# Patient Record
Sex: MALE | Race: WHITE | Employment: UNEMPLOYED | URBAN - METROPOLITAN AREA
[De-identification: names, ages, dates, MRNs, and addresses within clinical notes are randomized per-mention and may not be internally consistent; named-entity substitution may affect disease eponyms.]

---

## 2017-06-01 DIAGNOSIS — E87.6 HYPOKALEMIA: ICD-10-CM

## 2017-06-01 DIAGNOSIS — K21.00 GASTROESOPHAGEAL REFLUX DISEASE WITH ESOPHAGITIS: ICD-10-CM

## 2017-06-01 DIAGNOSIS — J45.40 MODERATE PERSISTENT ASTHMA WITHOUT COMPLICATION: ICD-10-CM

## 2017-06-01 DIAGNOSIS — Z13.220 SCREENING CHOLESTEROL LEVEL: ICD-10-CM

## 2017-06-01 LAB
ALBUMIN SERPL-MCNC: 4.7 G/DL (ref 3.9–4.9)
ALP BLD-CCNC: 85 U/L (ref 35–104)
ALT SERPL-CCNC: 19 U/L (ref 0–41)
ANION GAP SERPL CALCULATED.3IONS-SCNC: 14 MEQ/L (ref 7–13)
AST SERPL-CCNC: 14 U/L (ref 0–40)
BASOPHILS ABSOLUTE: 0.1 K/UL (ref 0–0.2)
BASOPHILS RELATIVE PERCENT: 1.1 %
BILIRUB SERPL-MCNC: 0.4 MG/DL (ref 0–1.2)
BUN BLDV-MCNC: 7 MG/DL (ref 6–20)
CALCIUM SERPL-MCNC: 9.7 MG/DL (ref 8.6–10.2)
CHLORIDE BLD-SCNC: 99 MEQ/L (ref 98–107)
CHOLESTEROL, TOTAL: 146 MG/DL (ref 0–199)
CO2: 26 MEQ/L (ref 22–29)
CREAT SERPL-MCNC: 0.92 MG/DL (ref 0.7–1.2)
EOSINOPHILS ABSOLUTE: 0.2 K/UL (ref 0–0.7)
EOSINOPHILS RELATIVE PERCENT: 2.6 %
GFR AFRICAN AMERICAN: >60
GFR NON-AFRICAN AMERICAN: >60
GLOBULIN: 2.2 G/DL (ref 2.3–3.5)
GLUCOSE BLD-MCNC: 88 MG/DL (ref 74–109)
HCT VFR BLD CALC: 42.6 % (ref 42–52)
HDLC SERPL-MCNC: 28 MG/DL (ref 40–59)
HEMOGLOBIN: 14.8 G/DL (ref 14–18)
LDL CHOLESTEROL CALCULATED: 85 MG/DL (ref 0–129)
LYMPHOCYTES ABSOLUTE: 2.4 K/UL (ref 1–4.8)
LYMPHOCYTES RELATIVE PERCENT: 26.7 %
MCH RBC QN AUTO: 33 PG (ref 27–31.3)
MCHC RBC AUTO-ENTMCNC: 34.6 % (ref 33–37)
MCV RBC AUTO: 95.4 FL (ref 80–100)
MONOCYTES ABSOLUTE: 0.6 K/UL (ref 0.2–0.8)
MONOCYTES RELATIVE PERCENT: 6.7 %
NEUTROPHILS ABSOLUTE: 5.7 K/UL (ref 1.4–6.5)
NEUTROPHILS RELATIVE PERCENT: 62.9 %
PDW BLD-RTO: 12.9 % (ref 11.5–14.5)
PLATELET # BLD: 371 K/UL (ref 130–400)
POTASSIUM SERPL-SCNC: 3 MEQ/L (ref 3.5–5.1)
POTASSIUM SERPL-SCNC: 3.3 MEQ/L (ref 3.5–5.1)
RBC # BLD: 4.47 M/UL (ref 4.7–6.1)
SODIUM BLD-SCNC: 139 MEQ/L (ref 132–144)
TOTAL PROTEIN: 6.9 G/DL (ref 6.4–8.1)
TRIGL SERPL-MCNC: 166 MG/DL (ref 0–200)
WBC # BLD: 9 K/UL (ref 4.8–10.8)

## 2017-06-08 DIAGNOSIS — E87.6 HYPOKALEMIA: ICD-10-CM

## 2017-06-08 LAB — POTASSIUM SERPL-SCNC: 3 MEQ/L (ref 3.5–5.1)

## 2017-06-15 DIAGNOSIS — E87.6 HYPOKALEMIA: ICD-10-CM

## 2017-06-15 LAB
MAGNESIUM: 2 MG/DL (ref 1.7–2.3)
POTASSIUM SERPL-SCNC: 3.9 MEQ/L (ref 3.5–5.1)

## 2017-06-18 ENCOUNTER — HOSPITAL ENCOUNTER (OUTPATIENT)
Dept: ULTRASOUND IMAGING | Age: 37
Discharge: HOME | End: 2017-06-18
Payer: COMMERCIAL

## 2017-06-18 ENCOUNTER — HOSPITAL ENCOUNTER (OUTPATIENT)
Dept: GENERAL RADIOLOGY | Age: 37
Discharge: HOME | End: 2017-06-18
Payer: COMMERCIAL

## 2017-06-18 DIAGNOSIS — R07.89 XYPHOIDALGIA: ICD-10-CM

## 2017-06-18 DIAGNOSIS — E87.6 HYPOKALEMIA: ICD-10-CM

## 2017-06-18 PROCEDURE — 71020 XR CHEST STANDARD TWO VW: CPT

## 2017-06-18 PROCEDURE — 76775 US EXAM ABDO BACK WALL LIM: CPT

## 2017-06-28 DIAGNOSIS — J45.40 MODERATE PERSISTENT ASTHMA WITHOUT COMPLICATION: ICD-10-CM

## 2017-09-20 ENCOUNTER — HOSPITAL ENCOUNTER (OUTPATIENT)
Dept: GENERAL RADIOLOGY | Age: 37
Discharge: HOME | End: 2017-09-20
Payer: COMMERCIAL

## 2017-09-20 DIAGNOSIS — M25.531 WRIST PAIN, CHRONIC, RIGHT: ICD-10-CM

## 2017-09-20 DIAGNOSIS — R20.0 NUMBNESS OF RIGHT HAND: ICD-10-CM

## 2017-09-20 DIAGNOSIS — G89.29 WRIST PAIN, CHRONIC, RIGHT: ICD-10-CM

## 2017-09-20 DIAGNOSIS — E87.6 HYPOKALEMIA: ICD-10-CM

## 2017-09-20 LAB — POTASSIUM SERPL-SCNC: 3.8 MEQ/L (ref 3.5–5.1)

## 2017-09-20 PROCEDURE — 73100 X-RAY EXAM OF WRIST: CPT

## 2017-10-25 ENCOUNTER — HOSPITAL ENCOUNTER (OUTPATIENT)
Dept: NEUROLOGY | Age: 37
Discharge: HOME | End: 2017-10-25
Payer: COMMERCIAL

## 2017-10-25 PROCEDURE — 95910 NRV CNDJ TEST 7-8 STUDIES: CPT

## 2017-10-25 PROCEDURE — 95886 MUSC TEST DONE W/N TEST COMP: CPT

## 2017-10-25 NOTE — PROCEDURES
[address]                     [address], [address] [facility]                            ELECTROMYOGRAM REPORT    PATIENT NAME: Merrill Beyer                    :             1980  MED REC NO:   [id number]                           ROOM:  ACCOUNT NO:   [de-identified]                          ADMISSION DATE:  10/25/2017  PROVIDER:     Milton Flores MD    DATE OF EMG:  10/25/2017    REFERRING PROVIDER:  Lilo Elizabeth PA-C    REASON FOR THE STUDY:  The patient was having numbness in the hands. EMG study was done to look for peripheral nerve entrapments versus  peripheral neuropathy versus cervical radiculopathy. Motor nerve conduction velocities and F wave latencies are normal in  all the nerves tested. Distal motor and sensory latencies are normal in the ulnar nerves but delayed in the median nerves, being worse on the left side. On the concentric needle electrode examination, mild denervation  changes are present in the thenar muscles. 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 The patient is being tried on conservative management. If his  symptoms continue or worsen, he may need decompression of the median  nerves to start with on the left side. If clinically indicated, we are going to repeat the study in a year. Thank you very much Ms. Elizabeth for allowing me to see the patient. Please feel free to call me if I can be of any further assistance  regarding this patient's evaluation.         Kayla Palencia MD    D: 10/25/2017 17:20:48       T: 10/25/2017 18:31:29     [email or web address]  Job#: [id number]     Doc#: [id number]

## 2018-02-08 ENCOUNTER — OFFICE VISIT (OUTPATIENT)
Dept: FAMILY MEDICINE CLINIC | Age: 38
End: 2018-02-08
Payer: COMMERCIAL

## 2018-02-08 VITALS
DIASTOLIC BLOOD PRESSURE: 78 MMHG | RESPIRATION RATE: 16 BRPM | WEIGHT: 169.2 LBS | HEART RATE: 72 BPM | BODY MASS INDEX: 25.64 KG/M2 | OXYGEN SATURATION: 99 % | HEIGHT: 68 IN | SYSTOLIC BLOOD PRESSURE: 120 MMHG | TEMPERATURE: 97.2 F

## 2018-02-08 DIAGNOSIS — H65.92 MIDDLE EAR EFFUSION, LEFT: ICD-10-CM

## 2018-02-08 DIAGNOSIS — J45.40 MODERATE PERSISTENT ASTHMA WITHOUT COMPLICATION: ICD-10-CM

## 2018-02-08 DIAGNOSIS — E87.6 HYPOKALEMIA: ICD-10-CM

## 2018-02-08 DIAGNOSIS — M25.531 RIGHT WRIST PAIN: Primary | ICD-10-CM

## 2018-02-08 DIAGNOSIS — R20.0 NUMBNESS OF FINGERS: ICD-10-CM

## 2018-02-08 DIAGNOSIS — R07.89 XYPHOIDALGIA: ICD-10-CM

## 2018-02-08 DIAGNOSIS — K21.00 GASTROESOPHAGEAL REFLUX DISEASE WITH ESOPHAGITIS: ICD-10-CM

## 2018-02-08 PROCEDURE — G8484 FLU IMMUNIZE NO ADMIN: HCPCS | Performed by: PHYSICIAN ASSISTANT

## 2018-02-08 PROCEDURE — 4004F PT TOBACCO SCREEN RCVD TLK: CPT | Performed by: PHYSICIAN ASSISTANT

## 2018-02-08 PROCEDURE — G8419 CALC BMI OUT NRM PARAM NOF/U: HCPCS | Performed by: PHYSICIAN ASSISTANT

## 2018-02-08 PROCEDURE — G8427 DOCREV CUR MEDS BY ELIG CLIN: HCPCS | Performed by: PHYSICIAN ASSISTANT

## 2018-02-08 PROCEDURE — 99214 OFFICE O/P EST MOD 30 MIN: CPT | Performed by: PHYSICIAN ASSISTANT

## 2018-02-08 RX ORDER — OMEPRAZOLE 40 MG/1
CAPSULE, DELAYED RELEASE ORAL
Qty: 30 CAPSULE | Refills: 3 | Status: SHIPPED | OUTPATIENT
Start: 2018-02-08 | End: 2018-08-28 | Stop reason: SDUPTHER

## 2018-02-08 RX ORDER — FEXOFENADINE HCL 180 MG/1
180 TABLET ORAL DAILY
Qty: 30 TABLET | Refills: 1 | Status: SHIPPED | OUTPATIENT
Start: 2018-02-08 | End: 2018-04-02 | Stop reason: SDUPTHER

## 2018-02-08 RX ORDER — POTASSIUM CHLORIDE 20 MEQ/1
TABLET, EXTENDED RELEASE ORAL
Qty: 30 TABLET | Refills: 2 | Status: SHIPPED | OUTPATIENT
Start: 2018-02-08 | End: 2018-03-18 | Stop reason: SDUPTHER

## 2018-02-08 RX ORDER — MELOXICAM 15 MG/1
TABLET ORAL
Qty: 30 TABLET | Refills: 3 | Status: SHIPPED | OUTPATIENT
Start: 2018-02-08 | End: 2018-05-26 | Stop reason: SDUPTHER

## 2018-02-08 ASSESSMENT — ENCOUNTER SYMPTOMS
SINUS PRESSURE: 0
FACIAL SWELLING: 0
CHEST TIGHTNESS: 0
COLOR CHANGE: 0
SHORTNESS OF BREATH: 0
COUGH: 0
RHINORRHEA: 0
ABDOMINAL DISTENTION: 0
BLOOD IN STOOL: 0
ABDOMINAL PAIN: 0

## 2018-02-08 NOTE — PROGRESS NOTES
Subjective  Micha Farmer, 40 y.o. male presents today with:  Chief Complaint   Patient presents with    Wrist Pain     R x years, worsening     Tinnitus     left ear is thumping when he lays down      HPI  Madalyn Diaz presents today for routine follow up. Last OV with me 9/20/2017. At last OV, chronic R wrist pain was discussed. Xray of R wrist was done. Imaging findings are consistent with arthritis. EMG was ordered, patient did not have testing done. Reports that discomfort is worsening with pain worse at night. He has decreased  strength with numbness/tingling in his 2nd-4th fingers (palmar surface). Starting to affect his ADLs (with work). Ear Fullness    There is pain in the left ear. This is a new problem. Episode onset: ~3-4 weeks. Episode frequency: nightly. The problem has been unchanged. There has been no fever. The patient is experiencing no pain. Pertinent negatives include no abdominal pain, coughing, ear discharge, headaches, hearing loss, neck pain or rhinorrhea. He has tried nothing for the symptoms. The treatment provided no relief. Requesting refills of chronic medications. Review of Systems   Constitutional: Negative for activity change, appetite change, fatigue and fever. HENT: Negative for congestion, ear discharge, ear pain, facial swelling, hearing loss, postnasal drip, rhinorrhea and sinus pressure. Respiratory: Negative for cough, chest tightness and shortness of breath. Cardiovascular: Negative for chest pain, palpitations and leg swelling. Gastrointestinal: Negative for abdominal distention, abdominal pain and blood in stool. Musculoskeletal: Positive for arthralgias. Negative for neck pain. Skin: Negative for color change. Neurological: Positive for weakness (R hand strength). Negative for headaches. Psychiatric/Behavioral: Positive for sleep disturbance (from ear fullness ). Negative for dysphoric mood. The patient is not nervous/anxious. Past Medical History:   Diagnosis Date    History of gallstones     Moderate persistent asthma without complication 10/64/1617     Past Surgical History:   Procedure Laterality Date    CHOLECYSTECTOMY  7/28/15    lap gabrielle with grams    ERCP  7/30/2015    ERCP with sphincterotomy and common bile duct sweep    ORCHIOPEXY Left     TESTICLE SURGERY  at 6 years    UPPER GASTROINTESTINAL ENDOSCOPY  11/11/15    w/bx      Social History     Social History    Marital status: Single     Spouse name: N/A    Number of children: N/A    Years of education: N/A     Occupational History    Not on file.      Social History Main Topics    Smoking status: Current Every Day Smoker     Packs/day: 0.50    Smokeless tobacco: Never Used    Alcohol use No    Drug use: Yes     Types: Marijuana    Sexual activity: Yes     Other Topics Concern    Not on file     Social History Narrative    No narrative on file     Family History   Problem Relation Age of Onset    Asthma Mother     COPD Mother     Lung Cancer Father     Cancer Paternal Uncle      Lung     No Known Allergies  Current Outpatient Prescriptions   Medication Sig Dispense Refill    albuterol-ipratropium (COMBIVENT RESPIMAT)  MCG/ACT AERS inhaler Inhale 1 puff into the lungs every 6 hours as needed for Wheezing 2 Inhaler 3    meloxicam (MOBIC) 15 MG tablet take 1 tablet by mouth once daily 30 tablet 3    omeprazole (PRILOSEC) 40 MG delayed release capsule take 1 capsule by mouth once daily 30 capsule 3    potassium chloride (KLOR-CON M) 20 MEQ extended release tablet take 1 tablet by mouth twice a day 30 tablet 2    fexofenadine (ALLEGRA) 180 MG tablet Take 1 tablet by mouth daily 30 tablet 1    albuterol (PROVENTIL) (2.5 MG/3ML) 0.083% nebulizer solution inhale contents of 1 vial in nebulizer every 6 hours if needed 300 mL 3    Nebulizers (AIRIAL COMPACT MINI NEBULIZER) MISC Use as directed  Dx: ICD-10-CM: J45.40     ICD-9-CM: 493.90

## 2018-05-01 ENCOUNTER — OFFICE VISIT (OUTPATIENT)
Dept: FAMILY MEDICINE CLINIC | Age: 38
End: 2018-05-01
Payer: COMMERCIAL

## 2018-05-01 VITALS
WEIGHT: 168.8 LBS | HEIGHT: 68 IN | BODY MASS INDEX: 25.58 KG/M2 | TEMPERATURE: 97.9 F | DIASTOLIC BLOOD PRESSURE: 80 MMHG | SYSTOLIC BLOOD PRESSURE: 128 MMHG | OXYGEN SATURATION: 98 % | HEART RATE: 84 BPM | RESPIRATION RATE: 16 BRPM

## 2018-05-01 DIAGNOSIS — E87.5 HYPERKALEMIA: ICD-10-CM

## 2018-05-01 DIAGNOSIS — J45.40 MODERATE PERSISTENT ASTHMA WITHOUT COMPLICATION: ICD-10-CM

## 2018-05-01 DIAGNOSIS — R14.0 ABDOMINAL BLOATING: ICD-10-CM

## 2018-05-01 DIAGNOSIS — R10.13 EPIGASTRIC ABDOMINAL PAIN: Primary | ICD-10-CM

## 2018-05-01 LAB — POTASSIUM SERPL-SCNC: 3.9 MEQ/L (ref 3.5–5.1)

## 2018-05-01 PROCEDURE — G8427 DOCREV CUR MEDS BY ELIG CLIN: HCPCS | Performed by: PHYSICIAN ASSISTANT

## 2018-05-01 PROCEDURE — 4004F PT TOBACCO SCREEN RCVD TLK: CPT | Performed by: PHYSICIAN ASSISTANT

## 2018-05-01 PROCEDURE — 99214 OFFICE O/P EST MOD 30 MIN: CPT | Performed by: PHYSICIAN ASSISTANT

## 2018-05-01 PROCEDURE — G8419 CALC BMI OUT NRM PARAM NOF/U: HCPCS | Performed by: PHYSICIAN ASSISTANT

## 2018-05-01 ASSESSMENT — ENCOUNTER SYMPTOMS
COLOR CHANGE: 0
TROUBLE SWALLOWING: 0
WHEEZING: 0
ABDOMINAL PAIN: 1
ABDOMINAL DISTENTION: 1
CHEST TIGHTNESS: 1
BACK PAIN: 0
SHORTNESS OF BREATH: 0
COUGH: 0
CONSTIPATION: 0
VOMITING: 0
NAUSEA: 1
DIARRHEA: 0

## 2018-05-14 ENCOUNTER — HOSPITAL ENCOUNTER (OUTPATIENT)
Dept: NUCLEAR MEDICINE | Age: 38
Discharge: HOME | End: 2018-05-16
Payer: COMMERCIAL

## 2018-05-14 DIAGNOSIS — R14.0 ABDOMINAL BLOATING: ICD-10-CM

## 2018-05-14 DIAGNOSIS — R10.13 EPIGASTRIC ABDOMINAL PAIN: ICD-10-CM

## 2018-05-14 PROCEDURE — 3430000000 HC RX DIAGNOSTIC RADIOPHARMACEUTICAL: Performed by: PHYSICIAN ASSISTANT

## 2018-05-14 PROCEDURE — A9541 TC99M SULFUR COLLOID: HCPCS | Performed by: PHYSICIAN ASSISTANT

## 2018-05-14 PROCEDURE — 78264 GASTRIC EMPTYING IMG STUDY: CPT

## 2018-05-14 RX ADMIN — Medication 2 MILLICURIE: at 08:30

## 2018-05-17 DIAGNOSIS — J45.30 MILD PERSISTENT ASTHMA WITHOUT COMPLICATION: Primary | ICD-10-CM

## 2018-05-25 RX ORDER — FLUTICASONE PROPIONATE 44 UG/1
2 AEROSOL, METERED RESPIRATORY (INHALATION) 2 TIMES DAILY
Qty: 1 INHALER | Refills: 3 | Status: SHIPPED | OUTPATIENT
Start: 2018-05-25 | End: 2018-09-26 | Stop reason: SDUPTHER

## 2018-05-30 DIAGNOSIS — J45.40 MODERATE PERSISTENT ASTHMA WITHOUT COMPLICATION: ICD-10-CM

## 2018-05-30 RX ORDER — IPRATROPIUM BROMIDE AND ALBUTEROL SULFATE 2.5; .5 MG/3ML; MG/3ML
1 SOLUTION RESPIRATORY (INHALATION) EVERY 4 HOURS PRN
Qty: 360 ML | Refills: 0 | Status: SHIPPED | OUTPATIENT
Start: 2018-05-30 | End: 2018-07-02 | Stop reason: SDUPTHER

## 2018-07-02 RX ORDER — IPRATROPIUM BROMIDE AND ALBUTEROL SULFATE 2.5; .5 MG/3ML; MG/3ML
1 SOLUTION RESPIRATORY (INHALATION) EVERY 4 HOURS PRN
Qty: 360 ML | Refills: 0 | Status: SHIPPED | OUTPATIENT
Start: 2018-07-02 | End: 2018-07-06 | Stop reason: SDUPTHER

## 2018-07-06 RX ORDER — IPRATROPIUM BROMIDE AND ALBUTEROL SULFATE 2.5; .5 MG/3ML; MG/3ML
1 SOLUTION RESPIRATORY (INHALATION) EVERY 4 HOURS PRN
Qty: 360 ML | Refills: 1 | Status: SHIPPED | OUTPATIENT
Start: 2018-07-06 | End: 2018-10-27 | Stop reason: SDUPTHER

## 2018-07-09 RX ORDER — IPRATROPIUM BROMIDE AND ALBUTEROL SULFATE 2.5; .5 MG/3ML; MG/3ML
1 SOLUTION RESPIRATORY (INHALATION) EVERY 4 HOURS PRN
Qty: 360 ML | Refills: 0 | OUTPATIENT
Start: 2018-07-09

## 2018-07-09 NOTE — TELEPHONE ENCOUNTER
From: Evelyn Salguero  Sent: 7/6/2018 5:17 AM EDT  Subject: Medication Renewal Request    Evelyn Salguero would like a refill of the following medications:     ipratropium-albuterol (DUONEB) 0.5-2.5 (3) MG/3ML SOLN nebulizer solution Micheline Forman PA-C]    Preferred pharmacy: [address] [place] [address], [address] [place] [address],[address] [phone]    Comment:

## 2018-09-20 ENCOUNTER — OFFICE VISIT (OUTPATIENT)
Dept: FAMILY MEDICINE CLINIC | Age: 38
End: 2018-09-20
Payer: COMMERCIAL

## 2018-09-20 VITALS
OXYGEN SATURATION: 98 % | WEIGHT: 163.4 LBS | DIASTOLIC BLOOD PRESSURE: 80 MMHG | HEIGHT: 68 IN | SYSTOLIC BLOOD PRESSURE: 110 MMHG | TEMPERATURE: 98 F | HEART RATE: 60 BPM | BODY MASS INDEX: 24.77 KG/M2 | RESPIRATION RATE: 16 BRPM

## 2018-09-20 DIAGNOSIS — J45.40 MODERATE PERSISTENT ASTHMA WITHOUT COMPLICATION: Primary | ICD-10-CM

## 2018-09-20 DIAGNOSIS — E87.6 HYPOKALEMIA: ICD-10-CM

## 2018-09-20 DIAGNOSIS — Z13.220 SCREENING CHOLESTEROL LEVEL: ICD-10-CM

## 2018-09-20 DIAGNOSIS — R20.2 NUMBNESS AND TINGLING IN BOTH HANDS: ICD-10-CM

## 2018-09-20 DIAGNOSIS — J45.40 MODERATE PERSISTENT ASTHMA WITHOUT COMPLICATION: ICD-10-CM

## 2018-09-20 DIAGNOSIS — Z11.4 ENCOUNTER FOR SCREENING FOR HIV: ICD-10-CM

## 2018-09-20 DIAGNOSIS — R20.0 NUMBNESS AND TINGLING IN BOTH HANDS: ICD-10-CM

## 2018-09-20 LAB
ALBUMIN SERPL-MCNC: 4.6 G/DL (ref 3.9–4.9)
ALP BLD-CCNC: 81 U/L (ref 35–104)
ALT SERPL-CCNC: 16 U/L (ref 0–41)
ANION GAP SERPL CALCULATED.3IONS-SCNC: 13 MEQ/L (ref 7–13)
AST SERPL-CCNC: 13 U/L (ref 0–40)
BASOPHILS ABSOLUTE: 0.1 K/UL (ref 0–0.2)
BASOPHILS RELATIVE PERCENT: 0.9 %
BILIRUB SERPL-MCNC: 0.3 MG/DL (ref 0–1.2)
BUN BLDV-MCNC: 6 MG/DL (ref 6–20)
CALCIUM SERPL-MCNC: 9.4 MG/DL (ref 8.6–10.2)
CHLORIDE BLD-SCNC: 101 MEQ/L (ref 98–107)
CHOLESTEROL, TOTAL: 141 MG/DL (ref 0–199)
CO2: 28 MEQ/L (ref 22–29)
CREAT SERPL-MCNC: 1.01 MG/DL (ref 0.7–1.2)
EOSINOPHILS ABSOLUTE: 0.2 K/UL (ref 0–0.7)
EOSINOPHILS RELATIVE PERCENT: 2 %
GFR AFRICAN AMERICAN: >60
GFR NON-AFRICAN AMERICAN: >60
GLOBULIN: 2.3 G/DL (ref 2.3–3.5)
GLUCOSE BLD-MCNC: 100 MG/DL (ref 74–109)
HCT VFR BLD CALC: 40.9 % (ref 42–52)
HDLC SERPL-MCNC: 28 MG/DL (ref 40–59)
HEMOGLOBIN: 14 G/DL (ref 14–18)
LDL CHOLESTEROL CALCULATED: 76 MG/DL (ref 0–129)
LYMPHOCYTES ABSOLUTE: 2 K/UL (ref 1–4.8)
LYMPHOCYTES RELATIVE PERCENT: 21.8 %
MCH RBC QN AUTO: 33.5 PG (ref 27–31.3)
MCHC RBC AUTO-ENTMCNC: 34.3 % (ref 33–37)
MCV RBC AUTO: 97.6 FL (ref 80–100)
MONOCYTES ABSOLUTE: 0.7 K/UL (ref 0.2–0.8)
MONOCYTES RELATIVE PERCENT: 8.1 %
NEUTROPHILS ABSOLUTE: 6.2 K/UL (ref 1.4–6.5)
NEUTROPHILS RELATIVE PERCENT: 67.2 %
PDW BLD-RTO: 12.9 % (ref 11.5–14.5)
PLATELET # BLD: 363 K/UL (ref 130–400)
POTASSIUM SERPL-SCNC: 3.5 MEQ/L (ref 3.5–5.1)
RBC # BLD: 4.19 M/UL (ref 4.7–6.1)
SODIUM BLD-SCNC: 142 MEQ/L (ref 132–144)
TOTAL PROTEIN: 6.9 G/DL (ref 6.4–8.1)
TRIGL SERPL-MCNC: 183 MG/DL (ref 0–200)
WBC # BLD: 9.2 K/UL (ref 4.8–10.8)

## 2018-09-20 PROCEDURE — G8420 CALC BMI NORM PARAMETERS: HCPCS | Performed by: PHYSICIAN ASSISTANT

## 2018-09-20 PROCEDURE — 4004F PT TOBACCO SCREEN RCVD TLK: CPT | Performed by: PHYSICIAN ASSISTANT

## 2018-09-20 PROCEDURE — 99214 OFFICE O/P EST MOD 30 MIN: CPT | Performed by: PHYSICIAN ASSISTANT

## 2018-09-20 PROCEDURE — G8427 DOCREV CUR MEDS BY ELIG CLIN: HCPCS | Performed by: PHYSICIAN ASSISTANT

## 2018-09-20 ASSESSMENT — ENCOUNTER SYMPTOMS
BLOOD IN STOOL: 0
NAUSEA: 0
WHEEZING: 0
ABDOMINAL DISTENTION: 1
CHEST TIGHTNESS: 0
DIARRHEA: 0
TROUBLE SWALLOWING: 0
COLOR CHANGE: 0
BACK PAIN: 0
COUGH: 0
SINUS PRESSURE: 0
SHORTNESS OF BREATH: 0
CONSTIPATION: 0

## 2018-09-20 ASSESSMENT — PATIENT HEALTH QUESTIONNAIRE - PHQ9
SUM OF ALL RESPONSES TO PHQ QUESTIONS 1-9: 1
2. FEELING DOWN, DEPRESSED OR HOPELESS: 1
SUM OF ALL RESPONSES TO PHQ QUESTIONS 1-9: 1
SUM OF ALL RESPONSES TO PHQ9 QUESTIONS 1 & 2: 1
1. LITTLE INTEREST OR PLEASURE IN DOING THINGS: 0

## 2018-09-20 NOTE — PROGRESS NOTES
Subjective  Akshat Red, 40 y.o. male presents today with:  Chief Complaint   Patient presents with    Follow-up     routine, 4 month    Health Maintenance     agreeable to HIV screen      HPI  Danette Farias is in the office today for routine follow up. Last OV with me: 5/1/2018  Overall, doing well. Agreeable to HIV screening today. Asthma. Stable, compliant with his inhaler medications. Pollen is a known trigger of his allergies and asthma. Has been on inhaled corticosteroids--He was started on Qvar daily. Patient reports that he is doing very well on medications. Denies SOB/wheeze. Using albuterol nebulizer only as needed. Continuing with Allegra for antihistamine.     Abdominal pain. Still having bloating, pain, discomfort. Worse after meals and with prolonged bending (intraabdominal pressure) and physical labor. 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  He has been taking prilosec with moderate relief of discomfort. Admits to poor dietary choices--drinks A LOT of soda (2 liters of mountain dew/day). Hypokalemia. Continuing with oral supplementation. Denies CP, heart palpitations. Bilateral wrist numbness/tingling. Continuing to complain of bilateral wrist numbness/tingling. Worse with prolonged activities. 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 EMG was previously ordered for evaluation. This has not been done. Patient would still like to have done, if possible. Review of Systems   Constitutional: Negative for activity change, appetite change, chills, diaphoresis, fatigue, fever and unexpected weight change. HENT: Negative for congestion, dental problem, postnasal drip, sinus pressure and trouble swallowing. Eyes: Negative for visual disturbance. Respiratory: Negative for cough, chest tightness, shortness of breath and wheezing. Cardiovascular: Negative for chest pain, palpitations and leg swelling. Gastrointestinal: Positive for abdominal distention (occasional). Negative for blood in stool, constipation, diarrhea and nausea. Genitourinary: Negative for difficulty urinating and frequency. Musculoskeletal: Positive for arthralgias (bilateral wrist pain). Negative for back pain, gait problem, joint swelling and myalgias. Skin: Negative for color change. Neurological: Positive for numbness (and tingling of bilateral wrists). Negative for dizziness, tremors, syncope, speech difficulty, weakness, light-headedness and headaches. Psychiatric/Behavioral: Negative for sleep disturbance. The patient is not nervous/anxious. Past Medical History:   Diagnosis Date    History of gallstones     Moderate persistent asthma without complication 62/31/0746     Past Surgical History:   Procedure Laterality Date    CHOLECYSTECTOMY  7/28/15    lap gabrielle with grams    ERCP  7/30/2015    ERCP with sphincterotomy and common bile duct sweep    ORCHIOPEXY Left     TESTICLE SURGERY  at 6 years    UPPER GASTROINTESTINAL ENDOSCOPY  11/11/15    w/bx      Social History     Social History    Marital status: Single     Spouse name: N/A    Number of children: N/A    Years of education: N/A     Occupational History    Not on file.      Social History Main Topics    Smoking status: Current Every Day Smoker     Packs/day: 0.50    Smokeless tobacco: Never Used    Alcohol use No    Drug use: Yes     Types: Marijuana    Sexual activity: Yes     Other Topics Concern    Not on file     Social History Narrative    No narrative on file     Family History   Problem Relation Age of Onset    Asthma Mother     COPD Mother     Lung Cancer Father     Cancer Paternal Uncle         Lung     No Known Allergies  Current Outpatient Prescriptions   Medication Sig Dispense Refill    potassium chloride (KLOR-CON M) 20 MEQ extended release tablet take 1 tablet by mouth twice a day 30 tablet 2    omeprazole (PRILOSEC) Future     Number of Occurrences:   1     Standing Expiration Date:   9/20/2019    EMG     Standing Status:   Future     Standing Expiration Date:   9/20/2019     Order Specific Question:   Which body part? Answer:   BUE rule out CTS     No orders of the defined types were placed in this encounter. There are no discontinued medications. Return in about 6 months (around 3/20/2019) for follow up . Reviewed with the patient: current clinical status, medications, activities and diet. Side effects, adverse effects of the medication prescribed today, as well as treatment plan/ rationale and result expectations have been discussed with the patient who expresses understanding and desires to proceed. Close follow up to evaluate treatment results and for coordination of care. I have reviewed the patient's medical history in detail and updated the computerized patient record.     Serina Elizabeth PA-C

## 2018-09-22 LAB — HIV-1 AND HIV-2 ANTIBODIES: NEGATIVE

## 2018-10-23 ENCOUNTER — TELEPHONE (OUTPATIENT)
Dept: FAMILY MEDICINE CLINIC | Age: 38
End: 2018-10-23

## 2018-10-23 RX ORDER — KETOROLAC TROMETHAMINE 10 MG/1
10 TABLET, FILM COATED ORAL EVERY 6 HOURS PRN
Qty: 20 TABLET | Refills: 0 | Status: SHIPPED | OUTPATIENT
Start: 2018-10-23 | End: 2019-12-17 | Stop reason: SDUPTHER

## 2018-12-19 ENCOUNTER — HOSPITAL ENCOUNTER (OUTPATIENT)
Dept: NEUROLOGY | Age: 38
Discharge: HOME | End: 2018-12-19
Payer: COMMERCIAL

## 2018-12-19 ENCOUNTER — OFFICE VISIT (OUTPATIENT)
Dept: FAMILY MEDICINE CLINIC | Age: 38
End: 2018-12-19
Payer: COMMERCIAL

## 2018-12-19 VITALS
WEIGHT: 163 LBS | TEMPERATURE: 98 F | HEIGHT: 68 IN | BODY MASS INDEX: 24.71 KG/M2 | RESPIRATION RATE: 16 BRPM | HEART RATE: 74 BPM | DIASTOLIC BLOOD PRESSURE: 76 MMHG | OXYGEN SATURATION: 98 % | SYSTOLIC BLOOD PRESSURE: 124 MMHG

## 2018-12-19 DIAGNOSIS — M79.601: Primary | ICD-10-CM

## 2018-12-19 DIAGNOSIS — R20.2 NUMBNESS AND TINGLING IN BOTH HANDS: ICD-10-CM

## 2018-12-19 DIAGNOSIS — R20.0 NUMBNESS AND TINGLING IN BOTH HANDS: ICD-10-CM

## 2018-12-19 PROCEDURE — 4004F PT TOBACCO SCREEN RCVD TLK: CPT | Performed by: NURSE PRACTITIONER

## 2018-12-19 PROCEDURE — G8484 FLU IMMUNIZE NO ADMIN: HCPCS | Performed by: NURSE PRACTITIONER

## 2018-12-19 PROCEDURE — 95886 MUSC TEST DONE W/N TEST COMP: CPT

## 2018-12-19 PROCEDURE — 95910 NRV CNDJ TEST 7-8 STUDIES: CPT

## 2018-12-19 PROCEDURE — 99213 OFFICE O/P EST LOW 20 MIN: CPT | Performed by: NURSE PRACTITIONER

## 2018-12-19 PROCEDURE — G8420 CALC BMI NORM PARAMETERS: HCPCS | Performed by: NURSE PRACTITIONER

## 2018-12-19 PROCEDURE — G8427 DOCREV CUR MEDS BY ELIG CLIN: HCPCS | Performed by: NURSE PRACTITIONER

## 2018-12-19 RX ORDER — KETOROLAC TROMETHAMINE 30 MG/ML
60 INJECTION, SOLUTION INTRAMUSCULAR; INTRAVENOUS ONCE
Status: COMPLETED | OUTPATIENT
Start: 2018-12-19 | End: 2018-12-19

## 2018-12-19 RX ORDER — METHYLPREDNISOLONE 4 MG/1
TABLET ORAL
Qty: 1 KIT | Refills: 0 | Status: SHIPPED | OUTPATIENT
Start: 2018-12-19 | End: 2018-12-25

## 2018-12-19 RX ORDER — CYCLOBENZAPRINE HCL 10 MG
10 TABLET ORAL 3 TIMES DAILY PRN
Qty: 15 TABLET | Refills: 0 | Status: SHIPPED | OUTPATIENT
Start: 2018-12-19 | End: 2018-12-22

## 2018-12-19 RX ADMIN — KETOROLAC TROMETHAMINE 60 MG: 30 INJECTION, SOLUTION INTRAMUSCULAR; INTRAVENOUS at 15:04

## 2018-12-19 NOTE — PROGRESS NOTES
Subjective  Melita Meek, 45 y.o. male presents today with:  Chief Complaint   Patient presents with    Arm Pain     x 1 week. pt states right arm pain with limited ROM, unable to lift above shoulder, pt states he was carrying drywall and felt a pulling sensation in his right upper arm. Arm Pain    The incident occurred 5 to 7 days ago. The incident occurred at work. The pain is present in the upper right arm. The quality of the pain is described as aching. The pain does not radiate. The pain is moderate. The pain has been fluctuating since the incident. Associated symptoms include muscle weakness, numbness and tingling. Pertinent negatives include no chest pain. The symptoms are aggravated by movement and palpation. He has tried NSAIDs for the symptoms. The treatment provided no relief. Review of Systems   Constitutional: Negative for chills, diaphoresis, fatigue and fever. Respiratory: Negative for chest tightness and shortness of breath. Cardiovascular: Negative for chest pain. Musculoskeletal: Positive for myalgias (right upper arm). Negative for arthralgias, back pain, gait problem, joint swelling, neck pain and neck stiffness. Skin: Negative for color change, pallor, rash and wound. Neurological: Positive for tingling and numbness. Negative for weakness. Objective    Vitals:    12/19/18 1428   BP: 124/76   Pulse: 74   Resp: 16   Temp: 98 °F (36.7 °C)   SpO2: 98%   Weight: 163 lb (73.9 kg)   Height: 5' 8\" (1.727 m)       Physical Exam   Constitutional: He is oriented to person, place, and time. He appears well-developed and well-nourished. He is active. No distress. HENT:   Head: Normocephalic and atraumatic. Eyes: Pupils are equal, round, and reactive to light. Conjunctivae and EOM are normal.   Neck: Normal range of motion. Neck supple. Cardiovascular: Normal rate, regular rhythm, normal heart sounds and intact distal pulses.     Pulmonary/Chest: Effort normal and breath sounds normal. No respiratory distress. Musculoskeletal:        Right upper arm: He exhibits tenderness. He exhibits no bony tenderness, no swelling, no edema, no deformity and no laceration. Pain and weakness noted with forward and lateral arm extensions against resistance. Hand grasps are equal bilaterally. Neurological: He is alert and oriented to person, place, and time. He displays normal reflexes. He exhibits normal muscle tone. Coordination normal.   Skin: Skin is warm and dry. No ecchymosis and no rash noted. He is not diaphoretic. No erythema. Psychiatric: He has a normal mood and affect. His behavior is normal.   Nursing note and vitals reviewed. POC Testing Today: No results found for this visit on 12/19/18. Assessment & Plan    Diagnosis Orders   1. Upper extremity pain, lateral, right  methylPREDNISolone (MEDROL DOSEPACK) 4 MG tablet    ketorolac (TORADOL) injection 60 mg    Amb External Referral To Orthopedic Surgery    cyclobenzaprine (FLEXERIL) 10 MG tablet       Orders Placed This Encounter   Procedures    Amb External Referral To Orthopedic Surgery     Referral Priority:   Routine     Referral Type:   Eval and Treat     Referral Reason:   Specialty Services Required     Referred to Provider:   Delvis Elizabeth MD     Requested Specialty:   Orthopedic Surgery     Number of Visits Requested:   1     Muscle Relaxer Instructions: Start with a 1/2 tab to evaluate effect. Use the lowest dose possible to achieve relief. Do not drive or operate machinery while taking them. Use ice (15 minutes on, 4x day) for first 48 hours post onset of pain/spasm, then switch to a heating pad. Do not fall asleep with heating pad on. Pain Instructions: Toradol IM given for pain. Do not take any additional NSAIDs (ibuprofen, Motrin, Aleve, aspirin) today. May take Tylenol/acetaminophen if needed for pain relief.     Sabrina Orellana aware that this office does not participate in [facility] claims, states he is

## 2018-12-23 ASSESSMENT — ENCOUNTER SYMPTOMS
SHORTNESS OF BREATH: 0
BACK PAIN: 0
CHEST TIGHTNESS: 0
COLOR CHANGE: 0

## 2019-01-03 ENCOUNTER — OFFICE VISIT (OUTPATIENT)
Dept: FAMILY MEDICINE CLINIC | Age: 39
End: 2019-01-03
Payer: COMMERCIAL

## 2019-01-03 VITALS
DIASTOLIC BLOOD PRESSURE: 70 MMHG | HEART RATE: 64 BPM | RESPIRATION RATE: 14 BRPM | WEIGHT: 166 LBS | OXYGEN SATURATION: 100 % | TEMPERATURE: 96.9 F | SYSTOLIC BLOOD PRESSURE: 132 MMHG | BODY MASS INDEX: 25.16 KG/M2 | HEIGHT: 68 IN

## 2019-01-03 DIAGNOSIS — M12.811 ROTATOR CUFF ARTHROPATHY OF RIGHT SHOULDER: ICD-10-CM

## 2019-01-03 DIAGNOSIS — M25.511 ACUTE PAIN OF RIGHT SHOULDER: Primary | ICD-10-CM

## 2019-01-03 DIAGNOSIS — J45.40 MODERATE PERSISTENT ASTHMA WITHOUT COMPLICATION: ICD-10-CM

## 2019-01-03 PROCEDURE — G8484 FLU IMMUNIZE NO ADMIN: HCPCS | Performed by: PHYSICIAN ASSISTANT

## 2019-01-03 PROCEDURE — G8419 CALC BMI OUT NRM PARAM NOF/U: HCPCS | Performed by: PHYSICIAN ASSISTANT

## 2019-01-03 PROCEDURE — 4004F PT TOBACCO SCREEN RCVD TLK: CPT | Performed by: PHYSICIAN ASSISTANT

## 2019-01-03 PROCEDURE — G8427 DOCREV CUR MEDS BY ELIG CLIN: HCPCS | Performed by: PHYSICIAN ASSISTANT

## 2019-01-03 PROCEDURE — 99214 OFFICE O/P EST MOD 30 MIN: CPT | Performed by: PHYSICIAN ASSISTANT

## 2019-01-03 RX ORDER — OXYCODONE HYDROCHLORIDE AND ACETAMINOPHEN 5; 325 MG/1; MG/1
1 TABLET ORAL EVERY 8 HOURS PRN
Qty: 21 TABLET | Refills: 0 | Status: SHIPPED | OUTPATIENT
Start: 2019-01-03 | End: 2019-01-10

## 2019-01-03 ASSESSMENT — ENCOUNTER SYMPTOMS
BACK PAIN: 0
COLOR CHANGE: 0

## 2019-01-15 ENCOUNTER — HOSPITAL ENCOUNTER (OUTPATIENT)
Dept: MRI IMAGING | Age: 39
Discharge: HOME | End: 2019-01-17
Payer: COMMERCIAL

## 2019-01-15 DIAGNOSIS — M12.811 ROTATOR CUFF ARTHROPATHY OF RIGHT SHOULDER: ICD-10-CM

## 2019-01-15 DIAGNOSIS — M12.811 ROTATOR CUFF ARTHROPATHY OF RIGHT SHOULDER: Primary | ICD-10-CM

## 2019-01-15 PROCEDURE — 73221 MRI JOINT UPR EXTREM W/O DYE: CPT

## 2019-01-18 DIAGNOSIS — S49.91XD INJURY OF RIGHT ACROMIOCLAVICULAR JOINT, SUBSEQUENT ENCOUNTER: Primary | ICD-10-CM

## 2019-01-18 RX ORDER — PREDNISONE 10 MG/1
TABLET ORAL
Qty: 51 TABLET | Refills: 0 | Status: SHIPPED | OUTPATIENT
Start: 2019-01-18 | End: 2019-01-28

## 2019-05-20 DIAGNOSIS — R07.89 XYPHOIDALGIA: ICD-10-CM

## 2019-05-20 NOTE — TELEPHONE ENCOUNTER
Rx request   Requested Prescriptions     Pending Prescriptions Disp Refills    meloxicam (MOBIC) 15 MG tablet [Pharmacy Med Name: MELOXICAM 15 MG TABLET] 30 tablet 3     Sig: take 1 tablet by mouth once daily    FLOVENT HFA 44 MCG/ACT inhaler [Pharmacy Med Name: FLOVENT HFA 44 MCG INHALER] 10.6 g 3     Sig: inhale 2 puffs by mouth twice a day     LOV 1/3/2019  Next Visit Date:  No future appointments.

## 2019-05-23 RX ORDER — MELOXICAM 15 MG/1
TABLET ORAL
Qty: 30 TABLET | Refills: 3 | Status: SHIPPED | OUTPATIENT
Start: 2019-05-23 | End: 2019-09-07 | Stop reason: SDUPTHER

## 2019-05-23 RX ORDER — FLUTICASONE PROPIONATE 44 MCG
AEROSOL WITH ADAPTER (GRAM) INHALATION
Qty: 10.6 G | Refills: 3 | Status: SHIPPED | OUTPATIENT
Start: 2019-05-23 | End: 2019-09-07 | Stop reason: SDUPTHER

## 2019-06-01 DIAGNOSIS — E87.6 HYPOKALEMIA: ICD-10-CM

## 2019-06-01 NOTE — TELEPHONE ENCOUNTER
Rx request   Requested Prescriptions     Pending Prescriptions Disp Refills    potassium chloride (KLOR-CON M) 20 MEQ extended release tablet [Pharmacy Med Name: POTASSIUM CL ER 20 MEQ TABLET] 30 tablet 2     Sig: take 1 tablet by mouth twice a day     LOV 1/3/2019  Next Visit Date:  No future appointments.

## 2019-06-03 RX ORDER — POTASSIUM CHLORIDE 20 MEQ/1
TABLET, EXTENDED RELEASE ORAL
Qty: 30 TABLET | Refills: 2 | Status: SHIPPED | OUTPATIENT
Start: 2019-06-03 | End: 2019-07-23 | Stop reason: SDUPTHER

## 2019-06-14 RX ORDER — IPRATROPIUM BROMIDE AND ALBUTEROL SULFATE 2.5; .5 MG/3ML; MG/3ML
SOLUTION RESPIRATORY (INHALATION)
Qty: 360 ML | Refills: 1 | Status: SHIPPED | OUTPATIENT
Start: 2019-06-14 | End: 2019-08-11 | Stop reason: SDUPTHER

## 2019-06-14 NOTE — TELEPHONE ENCOUNTER
Rx request   Requested Prescriptions     Pending Prescriptions Disp Refills    ipratropium-albuterol (DUONEB) 0.5-2.5 (3) MG/3ML SOLN nebulizer solution [Pharmacy Med Name: IPRAT-ALBUT 0.5-3(2.5) MG/3 ML] 360 mL 1     Sig: inhale contents of 1 vial every 4 hours in nebulizer if needed for wheezing     LOV 1/3/2019  Next Visit Date:  No future appointments.

## 2019-06-29 ENCOUNTER — HOSPITAL ENCOUNTER (EMERGENCY)
Age: 39
Discharge: HOME | End: 2019-06-29
Payer: COMMERCIAL

## 2019-06-29 VITALS
WEIGHT: 160 LBS | DIASTOLIC BLOOD PRESSURE: 71 MMHG | RESPIRATION RATE: 16 BRPM | SYSTOLIC BLOOD PRESSURE: 111 MMHG | HEIGHT: 68 IN | HEART RATE: 75 BPM | OXYGEN SATURATION: 99 % | TEMPERATURE: 98.2 F | BODY MASS INDEX: 24.25 KG/M2

## 2019-06-29 DIAGNOSIS — S39.012A STRAIN OF LUMBAR REGION, INITIAL ENCOUNTER: Primary | ICD-10-CM

## 2019-06-29 PROCEDURE — 96372 THER/PROPH/DIAG INJ SC/IM: CPT

## 2019-06-29 PROCEDURE — 99282 EMERGENCY DEPT VISIT SF MDM: CPT

## 2019-06-29 PROCEDURE — 6360000002 HC RX W HCPCS: Performed by: PHYSICIAN ASSISTANT

## 2019-06-29 RX ORDER — CYCLOBENZAPRINE HCL 10 MG
10 TABLET ORAL 3 TIMES DAILY PRN
Qty: 21 TABLET | Refills: 0 | Status: SHIPPED | OUTPATIENT
Start: 2019-06-29 | End: 2019-07-06

## 2019-06-29 RX ORDER — ORPHENADRINE CITRATE 30 MG/ML
60 INJECTION INTRAMUSCULAR; INTRAVENOUS ONCE
Status: COMPLETED | OUTPATIENT
Start: 2019-06-29 | End: 2019-06-29

## 2019-06-29 RX ORDER — NAPROXEN 500 MG/1
500 TABLET ORAL 2 TIMES DAILY WITH MEALS
Qty: 14 TABLET | Refills: 0 | Status: SHIPPED | OUTPATIENT
Start: 2019-06-29 | End: 2020-01-20

## 2019-06-29 RX ORDER — KETOROLAC TROMETHAMINE 30 MG/ML
30 INJECTION, SOLUTION INTRAMUSCULAR; INTRAVENOUS ONCE
Status: COMPLETED | OUTPATIENT
Start: 2019-06-29 | End: 2019-06-29

## 2019-06-29 RX ADMIN — ORPHENADRINE CITRATE 60 MG: 30 INJECTION INTRAMUSCULAR; INTRAVENOUS at 17:39

## 2019-06-29 RX ADMIN — KETOROLAC TROMETHAMINE 30 MG: 30 INJECTION, SOLUTION INTRAMUSCULAR at 17:39

## 2019-06-29 ASSESSMENT — ENCOUNTER SYMPTOMS
BACK PAIN: 1
ABDOMINAL DISTENTION: 0
CHOKING: 0
COUGH: 0
EYE DISCHARGE: 0
RHINORRHEA: 0
COLOR CHANGE: 0
SHORTNESS OF BREATH: 0
SORE THROAT: 0
ABDOMINAL PAIN: 0
CONSTIPATION: 0

## 2019-06-29 ASSESSMENT — PAIN SCALES - GENERAL
PAINLEVEL_OUTOF10: 9
PAINLEVEL_OUTOF10: 9

## 2019-06-29 ASSESSMENT — PAIN DESCRIPTION - PROGRESSION: CLINICAL_PROGRESSION: GRADUALLY WORSENING

## 2019-06-29 ASSESSMENT — PAIN DESCRIPTION - ORIENTATION: ORIENTATION: LOWER

## 2019-06-29 ASSESSMENT — PAIN DESCRIPTION - DESCRIPTORS: DESCRIPTORS: ACHING;SPASM

## 2019-06-29 ASSESSMENT — PAIN DESCRIPTION - LOCATION: LOCATION: BACK

## 2019-06-29 ASSESSMENT — PAIN DESCRIPTION - PAIN TYPE: TYPE: ACUTE PAIN

## 2019-06-29 NOTE — ED TRIAGE NOTES
Pt states he was emptying the kiddie pool yesterday with a bucket and threw his back out yesterday. Pt denies any numbness or tingling, pt denies any problems with bowels or bladder at this time. Pt is a+o x4 msps intact lungs clear bilat skin warm pink and dry. Pt appears in no distress at this time.  Pt states he did take ibuprofen yesterday without relief

## 2019-06-29 NOTE — ED PROVIDER NOTES
[address] [facility] ED  eMERGENCY dEPARTMENT eNCOUnter      Pt Name: Larry Gilliland  MRN: [id number]  [place] 1980  Date of evaluation: 6/29/2019  Provider: Trisha Pereira PA-C    CHIEF COMPLAINT       Chief Complaint   Patient presents with    Back Pain     lower back pa in , was emptying kiddie pool with bucket and threw lower back put yesterday tried ibuprofen without relief         HISTORY OF PRESENT ILLNESS   (Location/Symptom, Timing/Onset,Context/Setting, Quality, Duration, Modifying Factors, Severity)  Note limiting factors. Larry Gilliland is a 45 y.o. male who presents to the emergency department with a complaint of low back pain which patient states started yesterday. States he has a kiddie pool at home that he was baling out with a bucket he states his he continued to this he had a pull in his low back to the paraspinal muscles on both right and left side of the lumbar spine. She denies any weakness no numbness or tingling no bowel or bladder dysfunction no saddle paresthesias. States this is happened before in the past secondary to overuse states that his presentation is similar to other instance he rates his current pain as an 8 out of 10 and states he has been using Mobic at home without any significant relief. Denies any other complaints, there is no radiation of pain into buttock or lower extremities. Patient states past medical history significant for that of asthma. HPI    NursingNotes were reviewed. REVIEW OF SYSTEMS    (2-9 systems for level 4, 10 or more for level 5)     Review of Systems   Constitutional: Negative for activity change and appetite change. HENT: Negative for congestion, ear discharge, ear pain, nosebleeds, rhinorrhea and sore throat. Eyes: Negative for discharge. Respiratory: Negative for cough, choking and shortness of breath. Cardiovascular: Negative for chest pain, palpitations and leg swelling.    Gastrointestinal: Negative for abdominal (up to 7 for level 4, 8 or more for level 5)     ED Triage Vitals [06/29/19 1711]   BP Temp Temp Source Pulse Resp SpO2 Height Weight   111/71 98.2 °F (36.8 °C) Oral 75 16 -- 5' 8\" (1.727 m) 160 lb (72.6 kg)       Physical Exam   Constitutional: He is oriented to person, place, and time. He appears well-developed and well-nourished. HENT:   Head: Normocephalic. Eyes: Pupils are equal, round, and reactive to light. EOM are normal.   Neck: Normal range of motion. Neck supple. No JVD present. No tracheal deviation present. Cardiovascular: Normal rate. Pulmonary/Chest: Effort normal and breath sounds normal. No respiratory distress. He has no wheezes. He has no rales. He exhibits no tenderness. Abdominal: Soft. Bowel sounds are normal. He exhibits no distension and no mass. There is no tenderness. There is no guarding. Musculoskeletal: He exhibits no edema or deformity. Back:    Patient has mild bilateral tenderness to paraspinal muscles of the lumbar region there is no erythema no edema no bruising no rashes, no midline or spinal tenderness, patient is able to stand and ambulate with upright steady gait there is no limp or ataxia there is no foot drop. Neurological: He is oriented to person, place, and time. Coordination normal.   Skin: Skin is warm.        DIAGNOSTIC RESULTS     EKG: All EKG's are interpreted by the Emergency Department Physician who either signs or Co-signsthis chart in the absence of a cardiologist.        RADIOLOGY:   Mikle [place] such as CT, Ultrasound and MRI are read by the radiologist. Plain radiographic images are visualized and preliminarily interpreted by the emergency physician with the below findings:        Interpretation per the Radiologist below, if available at the time ofthis note:    No orders to display         ED BEDSIDE ULTRASOUND:   Performed by ED Physician - none    LABS:  Labs Reviewed - No data to display    All other labs were within normal range or not returned as of this dictation. EMERGENCY DEPARTMENT COURSE and DIFFERENTIAL DIAGNOSIS/MDM:   Vitals:    Vitals:    06/29/19 1711   BP: 111/71   Pulse: 75   Resp: 16   Temp: 98.2 °F (36.8 °C)   TempSrc: Oral   Weight: 160 lb (72.6 kg)   Height: 5' 8\" (1.727 m)          MDM  Number of Diagnoses or Management Options  Strain of lumbar region, initial encounter:   Diagnosis management comments: She complains of low back pain which started yesterday after bailing out water in a daily pool, he states he has had previous low back strains in the past, and this presentation similar to others. There is no bowel or bladder dysfunction no paresthesias. X-rays were not taken as there is no midline tenderness patient able to stand and ambulate with upright steady gait there is no limp foot drop or ataxia. She was given a shot of Toradol as well as Norflex in the ER and sent home with a prescription for Naprosyn and Flexeril. He was advised to follow-up with his regular family physician within 1 week if he has continued pain also given follow-up with orthopedics which she was advised if he still has pain in the next 5 to 7 days to call and schedule follow-up appointment. Was advised if he has any worsening symptoms return to the ER. CRITICAL CARE TIME   Total Critical Care time was 0 minutes, excluding separately reportableprocedures. There was a high probability of clinicallysignificant/life threatening deterioration in the patient's condition which required my urgent intervention. CONSULTS:  None    PROCEDURES:  Unless otherwise noted below, none     Procedures    FINAL IMPRESSION      1.  Strain of lumbar region, initial encounter          DISPOSITION/PLAN   DISPOSITION Decision To Discharge 06/29/2019 05:22:59 PM      PATIENT REFERRED TO:  [address] [facility], [address]  [address] [place] [address]    In 1 week      Jean Carlos Randall MD  [id number]  THE [facility]

## 2019-07-23 DIAGNOSIS — E87.6 HYPOKALEMIA: ICD-10-CM

## 2019-07-25 RX ORDER — POTASSIUM CHLORIDE 20 MEQ/1
TABLET, EXTENDED RELEASE ORAL
Qty: 30 TABLET | Refills: 2 | Status: SHIPPED | OUTPATIENT
Start: 2019-07-25 | End: 2019-12-17 | Stop reason: SDUPTHER

## 2019-12-17 ENCOUNTER — OFFICE VISIT (OUTPATIENT)
Dept: FAMILY MEDICINE CLINIC | Age: 39
End: 2019-12-17
Payer: COMMERCIAL

## 2019-12-17 VITALS
BODY MASS INDEX: 24.64 KG/M2 | RESPIRATION RATE: 15 BRPM | WEIGHT: 162.6 LBS | HEIGHT: 68 IN | TEMPERATURE: 97.4 F | SYSTOLIC BLOOD PRESSURE: 120 MMHG | OXYGEN SATURATION: 99 % | HEART RATE: 74 BPM | DIASTOLIC BLOOD PRESSURE: 80 MMHG

## 2019-12-17 DIAGNOSIS — J45.40 MODERATE PERSISTENT ASTHMA WITHOUT COMPLICATION: Primary | ICD-10-CM

## 2019-12-17 DIAGNOSIS — Z00.00 ENCOUNTER FOR ROUTINE HISTORY AND PHYSICAL EXAMINATION OF ADULT: ICD-10-CM

## 2019-12-17 DIAGNOSIS — Z13.220 SCREENING CHOLESTEROL LEVEL: ICD-10-CM

## 2019-12-17 DIAGNOSIS — F43.0 ACUTE STRESS DISORDER: ICD-10-CM

## 2019-12-17 DIAGNOSIS — E87.6 HYPOKALEMIA: ICD-10-CM

## 2019-12-17 DIAGNOSIS — R07.89 XYPHOIDALGIA: ICD-10-CM

## 2019-12-17 DIAGNOSIS — K21.00 GASTROESOPHAGEAL REFLUX DISEASE WITH ESOPHAGITIS: ICD-10-CM

## 2019-12-17 LAB
ALBUMIN SERPL-MCNC: 4.5 G/DL (ref 3.5–4.6)
ALP BLD-CCNC: 107 U/L (ref 35–104)
ALT SERPL-CCNC: 20 U/L (ref 0–41)
ANION GAP SERPL CALCULATED.3IONS-SCNC: 11 MEQ/L (ref 9–15)
AST SERPL-CCNC: 15 U/L (ref 0–40)
BILIRUB SERPL-MCNC: 0.4 MG/DL (ref 0.2–0.7)
BUN BLDV-MCNC: 6 MG/DL (ref 6–20)
CALCIUM SERPL-MCNC: 9.3 MG/DL (ref 8.5–9.9)
CHLORIDE BLD-SCNC: 101 MEQ/L (ref 95–107)
CHOLESTEROL, TOTAL: 137 MG/DL (ref 0–199)
CO2: 31 MEQ/L (ref 20–31)
CREAT SERPL-MCNC: 1.07 MG/DL (ref 0.7–1.2)
GFR AFRICAN AMERICAN: >60
GFR NON-AFRICAN AMERICAN: >60
GLOBULIN: 2.5 G/DL (ref 2.3–3.5)
GLUCOSE BLD-MCNC: 76 MG/DL (ref 70–99)
HCT VFR BLD CALC: 42.6 % (ref 42–52)
HDLC SERPL-MCNC: 32 MG/DL (ref 40–59)
HEMOGLOBIN: 14.7 G/DL (ref 14–18)
LDL CHOLESTEROL CALCULATED: 81 MG/DL (ref 0–129)
MCH RBC QN AUTO: 33.6 PG (ref 27–31.3)
MCHC RBC AUTO-ENTMCNC: 34.4 % (ref 33–37)
MCV RBC AUTO: 97.7 FL (ref 80–100)
PDW BLD-RTO: 12.8 % (ref 11.5–14.5)
PLATELET # BLD: 392 K/UL (ref 130–400)
POTASSIUM SERPL-SCNC: 3.5 MEQ/L (ref 3.4–4.9)
RBC # BLD: 4.36 M/UL (ref 4.7–6.1)
SODIUM BLD-SCNC: 143 MEQ/L (ref 135–144)
TOTAL PROTEIN: 7 G/DL (ref 6.3–8)
TRIGL SERPL-MCNC: 118 MG/DL (ref 0–150)
WBC # BLD: 10.4 K/UL (ref 4.8–10.8)

## 2019-12-17 PROCEDURE — G8484 FLU IMMUNIZE NO ADMIN: HCPCS | Performed by: PHYSICIAN ASSISTANT

## 2019-12-17 PROCEDURE — G8420 CALC BMI NORM PARAMETERS: HCPCS | Performed by: PHYSICIAN ASSISTANT

## 2019-12-17 PROCEDURE — 99214 OFFICE O/P EST MOD 30 MIN: CPT | Performed by: PHYSICIAN ASSISTANT

## 2019-12-17 PROCEDURE — G8427 DOCREV CUR MEDS BY ELIG CLIN: HCPCS | Performed by: PHYSICIAN ASSISTANT

## 2019-12-17 PROCEDURE — 4004F PT TOBACCO SCREEN RCVD TLK: CPT | Performed by: PHYSICIAN ASSISTANT

## 2019-12-17 RX ORDER — IPRATROPIUM BROMIDE AND ALBUTEROL SULFATE 2.5; .5 MG/3ML; MG/3ML
SOLUTION RESPIRATORY (INHALATION)
Qty: 360 ML | Refills: 1 | Status: SHIPPED | OUTPATIENT
Start: 2019-12-17 | End: 2020-04-02

## 2019-12-17 RX ORDER — OMEPRAZOLE 40 MG/1
CAPSULE, DELAYED RELEASE ORAL
Qty: 30 CAPSULE | Refills: 2 | Status: SHIPPED | OUTPATIENT
Start: 2019-12-17 | End: 2020-05-07

## 2019-12-17 RX ORDER — FEXOFENADINE HCL 180 MG/1
TABLET ORAL
Qty: 30 TABLET | Refills: 1 | Status: SHIPPED | OUTPATIENT
Start: 2019-12-17 | End: 2020-10-13

## 2019-12-17 RX ORDER — POTASSIUM CHLORIDE 20 MEQ/1
TABLET, EXTENDED RELEASE ORAL
Qty: 60 TABLET | Refills: 2 | Status: SHIPPED | OUTPATIENT
Start: 2019-12-17 | End: 2020-10-13 | Stop reason: SDUPTHER

## 2019-12-17 RX ORDER — KETOROLAC TROMETHAMINE 10 MG/1
10 TABLET, FILM COATED ORAL EVERY 6 HOURS PRN
Qty: 20 TABLET | Refills: 0 | Status: SHIPPED | OUTPATIENT
Start: 2019-12-17 | End: 2020-07-25

## 2019-12-17 RX ORDER — MELOXICAM 15 MG/1
TABLET ORAL
Qty: 30 TABLET | Refills: 3 | Status: CANCELLED | OUTPATIENT
Start: 2019-12-17

## 2019-12-17 RX ORDER — FLUTICASONE PROPIONATE 44 UG/1
AEROSOL, METERED RESPIRATORY (INHALATION)
Qty: 10.6 G | Refills: 3 | Status: CANCELLED | OUTPATIENT
Start: 2019-12-17

## 2019-12-17 RX ORDER — LEVOFLOXACIN 500 MG/1
TABLET, FILM COATED ORAL
COMMUNITY
Start: 2015-07-28 | End: 2019-12-17

## 2019-12-17 ASSESSMENT — ENCOUNTER SYMPTOMS
ABDOMINAL DISTENTION: 0
COLOR CHANGE: 0
CHEST TIGHTNESS: 0
BACK PAIN: 0
WHEEZING: 0
SHORTNESS OF BREATH: 0
COUGH: 0

## 2019-12-23 ENCOUNTER — CARE COORDINATION (OUTPATIENT)
Dept: CARE COORDINATION | Age: 39
End: 2019-12-23

## 2019-12-31 ENCOUNTER — CARE COORDINATION (OUTPATIENT)
Dept: CARE COORDINATION | Age: 39
End: 2019-12-31

## 2020-01-20 ENCOUNTER — CARE COORDINATION (OUTPATIENT)
Dept: CARE COORDINATION | Age: 40
End: 2020-01-20

## 2020-01-20 ENCOUNTER — OFFICE VISIT (OUTPATIENT)
Dept: FAMILY MEDICINE CLINIC | Age: 40
End: 2020-01-20
Payer: COMMERCIAL

## 2020-01-20 VITALS
WEIGHT: 164 LBS | OXYGEN SATURATION: 99 % | RESPIRATION RATE: 16 BRPM | HEIGHT: 68 IN | DIASTOLIC BLOOD PRESSURE: 82 MMHG | SYSTOLIC BLOOD PRESSURE: 118 MMHG | TEMPERATURE: 97.7 F | HEART RATE: 66 BPM | BODY MASS INDEX: 24.86 KG/M2

## 2020-01-20 PROBLEM — G54.0 THORACIC OUTLET SYNDROME: Status: ACTIVE | Noted: 2020-01-20

## 2020-01-20 PROCEDURE — G8420 CALC BMI NORM PARAMETERS: HCPCS | Performed by: PHYSICIAN ASSISTANT

## 2020-01-20 PROCEDURE — 4004F PT TOBACCO SCREEN RCVD TLK: CPT | Performed by: PHYSICIAN ASSISTANT

## 2020-01-20 PROCEDURE — G8484 FLU IMMUNIZE NO ADMIN: HCPCS | Performed by: PHYSICIAN ASSISTANT

## 2020-01-20 PROCEDURE — G8427 DOCREV CUR MEDS BY ELIG CLIN: HCPCS | Performed by: PHYSICIAN ASSISTANT

## 2020-01-20 PROCEDURE — 99214 OFFICE O/P EST MOD 30 MIN: CPT | Performed by: PHYSICIAN ASSISTANT

## 2020-01-20 RX ORDER — GABAPENTIN 100 MG/1
100 CAPSULE ORAL NIGHTLY
Qty: 30 CAPSULE | Refills: 0 | Status: SHIPPED | OUTPATIENT
Start: 2020-01-20 | End: 2020-02-17

## 2020-01-20 RX ORDER — FLUTICASONE FUROATE AND VILANTEROL 100; 25 UG/1; UG/1
1 POWDER RESPIRATORY (INHALATION) DAILY
Qty: 60 EACH | Refills: 2 | Status: SHIPPED | OUTPATIENT
Start: 2020-01-20 | End: 2020-07-10

## 2020-01-20 ASSESSMENT — PATIENT HEALTH QUESTIONNAIRE - PHQ9
2. FEELING DOWN, DEPRESSED OR HOPELESS: 1
SUM OF ALL RESPONSES TO PHQ9 QUESTIONS 1 & 2: 2
SUM OF ALL RESPONSES TO PHQ QUESTIONS 1-9: 2
SUM OF ALL RESPONSES TO PHQ QUESTIONS 1-9: 2
1. LITTLE INTEREST OR PLEASURE IN DOING THINGS: 1

## 2020-01-20 ASSESSMENT — ENCOUNTER SYMPTOMS
CHEST TIGHTNESS: 0
COUGH: 0
BACK PAIN: 0
COLOR CHANGE: 0
SHORTNESS OF BREATH: 0
ABDOMINAL DISTENTION: 0
WHEEZING: 0

## 2020-01-20 NOTE — PROGRESS NOTES
kg)     Physical Exam  Vitals signs and nursing note reviewed. Constitutional:       General: He is not in acute distress. Appearance: He is well-developed and normal weight. He is not diaphoretic. HENT:      Head: Normocephalic and atraumatic. Right Ear: External ear normal.      Left Ear: External ear normal.      Nose: Nose normal.      Mouth/Throat:      Mouth: Mucous membranes are moist.   Eyes:      Conjunctiva/sclera: Conjunctivae normal.      Pupils: Pupils are equal, round, and reactive to light. Neck:      Musculoskeletal: Normal range of motion and neck supple. Cardiovascular:      Rate and Rhythm: Normal rate and regular rhythm. Heart sounds: Normal heart sounds. Pulmonary:      Effort: Pulmonary effort is normal. No respiratory distress. Breath sounds: Normal breath sounds. No stridor. No wheezing or rhonchi. Musculoskeletal:         General: Tenderness (R pectoralis muscle with \"tingling\" of RUE provoked with deep palpation) present. Right shoulder: He exhibits decreased range of motion, tenderness, swelling, pain and decreased strength. He exhibits no bony tenderness, no effusion, no crepitus and no spasm. Left shoulder: Normal.      Right upper arm: He exhibits tenderness. He exhibits no bony tenderness, no swelling, no edema, no deformity and no laceration. Skin:     General: Skin is warm and dry. Findings: No ecchymosis, erythema or rash. Neurological:      Mental Status: He is alert and oriented to person, place, and time. Motor: No abnormal muscle tone. Coordination: Coordination normal.      Deep Tendon Reflexes: Reflexes normal.   Psychiatric:         Attention and Perception: Attention and perception normal.         Mood and Affect: Mood and affect normal. Mood is not anxious. Speech: Speech normal.         Behavior: Behavior normal. Behavior is cooperative.          Cognition and Memory: Cognition normal.         Judgment: Judgment normal.      Comments: Improved mood noted today. Leona Cabrales, RN Care Coordinator, in the room today to discuss financial stressors with patient. Assessment & Plan    Diagnosis Orders   1. Moderate persistent asthma without complication  fluticasone-vilanterol (BREO ELLIPTA) 100-25 MCG/INH AEPB inhaler   2. Numbness and tingling in both hands     3. Thoracic outlet syndrome  gabapentin (NEURONTIN) 100 MG capsule   4. Acute stress disorder     -Trial gabapentin for numbness.    -Start breo; hold flovent.   -Call with any questions or concerns.  -3 month follow up with me. Orders Placed This Encounter   Medications    fluticasone-vilanterol (BREO ELLIPTA) 100-25 MCG/INH AEPB inhaler     Sig: Inhale 1 puff into the lungs daily     Dispense:  60 each     Refill:  2    gabapentin (NEURONTIN) 100 MG capsule     Sig: Take 1 capsule by mouth nightly for 180 days. Intended supply: 90 days     Dispense:  30 capsule     Refill:  0     Medications Discontinued During This Encounter   Medication Reason    naproxen (NAPROSYN) 500 MG tablet LIST CLEANUP    meloxicam (MOBIC) 15 MG tablet LIST CLEANUP     No follow-ups on file. Reviewed with the patient: current clinical status,medications, activities and diet. Side effects, adverse effects of themedication prescribed today, as well as treatment plan/ rationale and result expectations have been discussed with the patient who expresses understanding and desires to proceed. Close follow up to evaluate treatment results and for coordination of care. I have reviewed the patient's medical history in detail and updated the computerized patient record.      Serina Elizabeth PA-C

## 2020-01-21 NOTE — CARE COORDINATION
Ambulatory Care Coordination Note  1/20/2020  CM Risk Score: 5  Charlson 10 Year Mortality Risk Score: 4%     ACC: Vijay Perez RN    Summary Note: met with chente at dr visit per request of pcp. He lives with significant other who has mental illness. He is not able to keep regular job due to the care she requires. He works\"odd jobs\". 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 He is having difficulty paying property taxes. Will contact msw for any assistance. He is in agreement with care coordination but is concerned about maintaining contact. He has to leave his phone with her and she might not give him the message. He reports shortness of breath with exertion. He is currently smoking and declines cessation. Discussed c and he did decline. Discussed s/s to report to dr and s/s that indicate decline in condition    Ambulatory Care Coordination Assessment    Care Coordination Protocol  Program Enrollment:  Rising Risk  Referral from Primary Care Provider:  Yes  Week 1 - Initial Assessment     Do you have all of your prescriptions and are they filled?:  Yes  Barriers to medication adherence:  None  Are you able to afford your medications?:  Yes  How often do you have trouble taking your medications the way you have been told to take them?:  I always take them as prescribed.      Do you have Home O2 Therapy?:  No      Is patient able to live independently?:  Yes     Current Housing:  Private Residence  For whom are you the caregiver?:  significant other  Does the person that you care for see a [facility] PCP?:  Yes        Per the Fall Risk Screening, did the patient have 2 or more falls or 1 fall with injury in the past year?:  No     Frequent urination at night?:  No  Do you use rails/bars?:  No  Do you have a non-slip tub mat?:  Yes     Are you experiencing loss of meaning?:  No  Are you experiencing loss of hope and peace?:  No     Thinking about your patient's physical health needs, are there any symptoms or problems (risk indicators) you are unsure about that require further investigation?:  No identified areas of uncertainly or problems already being investigated   Are the patients physical health problems impacting on their mental well-being?:  No identified areas of concern   Are there any problems with your patients lifestyle behaviors (alcohol, drugs, diet, exercise) that are impacting on physical or mental well-being?:  No identified areas of concern   Do you have any other concerns about your patients mental well-being? How would you rate their severity and impact on the patient?:  Mild problems - don't interfere with function   How would you rate their home environment in terms of safety and stability (including domestic violence, insecure housing, neighbor harassment)?:  Safe, stable, but with some inconsistency   How do daily activities impact on the patient's well-being? (include current or anticipated unemployment, work, caregiving, access to transportation or other): Contributes to low mood or stress at times   How would you rate their social network (family, work, friends)?:  Adequate participation with social networks   How would you rate their financial resources (including ability to afford all required medical care)?:  Financially insecure, some resource challenges   How wells does the patient now understand their health and well-being (symptoms, signs or risk factors) and what they need to do to manage their health?:  Reasonable to good understanding and already engages in managing health or is willing to undertake better management   How well do you think your patient can engage in healthcare discussions?  (Barriers include language, deafness, aphasia, alcohol or drug problems, learning difficulties, concentration):  Clear and open communication, no identified barriers   Do other services need to be involved to help this patient?:  Other care/services not in place and required   Are current services involved with this patient well-coordinated? (Include coordination with other services you are now recommendation):  Required care/services missing and/or fragmented   Suggested Interventions and Community Resources  Behavioral Health:  Declined     Pharmacist:  Not Started   Smoking Cessation:  Declined   Social Work:  Completed         Set up/Review Goals, Set up/Review an Education Plan              Prior to Admission medications    Medication Sig Start Date End Date Taking? Authorizing Provider   fluticasone-vilanterol (BREO ELLIPTA) 100-25 MCG/INH AEPB inhaler Inhale 1 puff into the lungs daily 1/20/20   Serina Elizabeth PA-C   gabapentin (NEURONTIN) 100 MG capsule Take 1 capsule by mouth nightly for 180 days.  Intended supply: 90 days 1/20/20 7/18/20  Serina Elizabeth PA-C   FLOVENT HFA 44 MCG/ACT inhaler inhale 2 puffs by mouth and INTO THE LUNGS twice a day 12/31/19   Serina Elizabeth PA-C   potassium chloride (KLOR-CON M) 20 MEQ extended release tablet take 1 tablet by mouth twice a day 12/17/19   Serina Elizabeth PA-C   omeprazole (PRILOSEC) 40 MG delayed release capsule take 1 capsule by mouth once daily 12/17/19   Serina Elizabeth PA-C   ketorolac (TORADOL) 10 MG tablet Take 1 tablet by mouth every 6 hours as needed for Pain 12/17/19   Serina Elizabeth PA-C   ipratropium-albuterol (DUONEB) 0.5-2.5 (3) MG/3ML SOLN nebulizer solution inhale contents of 1 vial ( 3 milliliters ) in nebulizer by mouth and INTO THE LUNGS every 4 hours if needed for wheezing 12/17/19   Serina Elizabeth PA-C   fexofenadine (ALLEGRA) 180 MG tablet take 1 tablet by mouth once daily 12/17/19   Serina Elizabeth PA-C   albuterol-ipratropium (COMBIVENT RESPIMAT)  MCG/ACT AERS inhaler Inhale 1 puff into the lungs every 4 hours as needed for Wheezing or Shortness of Breath 12/17/19   Serina Elizabeth PA-C   Nebulizers (AIRIAL COMPACT MINI NEBULIZER) MISC Use as directed  Dx: ICD-10-CM: J45.40     ICD-9-CM: 493.90  Patient not taking: Reported on 12/17/2019 3/21/17   Kerri Henderson MD   Respiratory Therapy Supplies (NEBULIZER/TUBING/MOUTHPIECE) KIT 1 kit by Does not apply route daily as needed 11/2/15 11/4/15  Kerri Henderson MD       Future Appointments   Date Time Provider Laura Armas   4/21/2020  1:45 PM LINK Hernandez [facility] [place]

## 2020-01-23 ENCOUNTER — CARE COORDINATION (OUTPATIENT)
Dept: CARE COORDINATION | Age: 40
End: 2020-01-23

## 2020-01-23 NOTE — CARE COORDINATION
Received request from [place] requesting assistance with patient. Patient needs community resources for assistance with paying property taxes. Request to send letter to patient because he may not get any phone calls or messages on his phone. .    Will mail out letter today. Left contact information in letter, in hopes that patient with receive and contact this worker. Will also research resources for assistance with property taxes.

## 2020-01-29 ENCOUNTER — CARE COORDINATION (OUTPATIENT)
Dept: CARE COORDINATION | Age: 40
End: 2020-01-29

## 2020-01-29 NOTE — CARE COORDINATION
Received referral from [facility] last week. Per request this worker mailed out a outreach letter and has not received a call. This worker attempted to call patient. Spoke to patients girlfriend Komal Sinclair who shared that patient is not home at this time. Left message with phone number for a return call.

## 2020-01-30 ENCOUNTER — CARE COORDINATION (OUTPATIENT)
Dept: CARE COORDINATION | Age: 40
End: 2020-01-30

## 2020-02-06 ENCOUNTER — CARE COORDINATION (OUTPATIENT)
Dept: CARE COORDINATION | Age: 40
End: 2020-02-06

## 2020-02-06 NOTE — CARE COORDINATION
Per request this worker mailed out a outreach letter on 1/23/2020 and has not received a returned call from patient. Called once more and spoke to girlfriend Marcus Lewis who reports that he is not home at this time. Request to have patient return call as there was a need for assistance. At this time will no longer attempt to call patient. Will send last outreach letter.

## 2020-02-06 NOTE — LETTER
Davion Hinojosa  [address] [place] [id number]         Dear Davion Hinojosa ,    My name is Margy Barnhart  and I am a [employer]  who partners with Augustin Edgar PA-C to provide community resources to patients. Augustin Edgar PA-C believes you would benefit from working with me. As a member of your health care team, I would work with other providers involved in your care, offer support, and connect you with additional resources as needed. The additional support I provide is no additional cost to you. My primary focus is to help you improve your health. We are committed to walk with you on this journey and look forward to working with you. Please call me to further discuss your healthcare needs. I am available by phone or for appointments at the office. You can reach me at [phone].     In good health,     Margy Barnhart, [address]

## 2020-02-07 NOTE — CARE COORDINATION
MG/3ML SOLN nebulizer solution inhale contents of 1 vial ( 3 milliliters ) in nebulizer by mouth and INTO THE LUNGS every 4 hours if needed for wheezing 12/17/19   Serina Elizabeth PA-C   fexofenadine (ALLEGRA) 180 MG tablet take 1 tablet by mouth once daily 12/17/19   Serina Elizabeth PA-C   albuterol-ipratropium (COMBIVENT RESPIMAT)  MCG/ACT AERS inhaler Inhale 1 puff into the lungs every 4 hours as needed for Wheezing or Shortness of Breath 12/17/19   Serina Elizabeth PA-C   Nebulizers (AIRIAL COMPACT MINI NEBULIZER) MISC Use as directed  Dx: ICD-10-CM: J45.40     ICD-9-CM: 493.90  Patient not taking: Reported on 12/17/2019 3/21/17   Tony Chapa MD   Respiratory Therapy Supplies (NEBULIZER/TUBING/MOUTHPIECE) KIT 1 kit by Does not apply route daily as needed 11/2/15 11/4/15  Tony Chapa MD       Future Appointments   Date Time Provider Laura Armas   4/21/2020  1:45 PM Kimi More PA-C [facility]

## 2020-02-17 ENCOUNTER — CARE COORDINATION (OUTPATIENT)
Dept: CARE COORDINATION | Age: 40
End: 2020-02-17

## 2020-02-18 NOTE — CARE COORDINATION
Ambulatory Care Coordination Note  2/17/2020  CM Risk Score: 5  Charlson 10 Year Mortality Risk Score: 4%     ACC: Divina Herrera, RN    Summary Note: call to Jayshree Bae to discuss health coaching. He reports no decline in resp status. He is aware to report any decline. Remains compliant with medications. Care Coordination Interventions    Program Enrollment:  Rising Risk  Referral from Primary Care Provider:  Yes  Suggested Interventions and Community Resources  Behavorial Health:  Declined  Pharmacist:  Not Started  Smoking Cessation:  Declined  Social Work:  Completed         Goals Addressed                 This Visit's Progress     Conditions and Symptoms   No change     I will notify my provider of any symptoms that indicate a worsening of my condition. Barriers: lack of support and stress  Plan for overcoming my barriers: care coordination, referral msw  Confidence: 6/10  Anticipated Goal Completion Date: 4/20/2020              Prior to Admission medications    Medication Sig Start Date End Date Taking? Authorizing Provider   gabapentin (NEURONTIN) 100 MG capsule Take 1 capsule by mouth nightly for 90 days.  2/17/20 5/17/20  Serina Elizabeth PA-C   fluticasone-vilanterol (BREO ELLIPTA) 100-25 MCG/INH AEPB inhaler Inhale 1 puff into the lungs daily 1/20/20   Serina Elizabeth PA-C   FLOVENT HFA 44 MCG/ACT inhaler inhale 2 puffs by mouth and INTO THE LUNGS twice a day 12/31/19   Serina Elizabeth PA-C   potassium chloride (KLOR-CON M) 20 MEQ extended release tablet take 1 tablet by mouth twice a day 12/17/19   Serina Elizabeth PA-C   omeprazole (PRILOSEC) 40 MG delayed release capsule take 1 capsule by mouth once daily 12/17/19   Serina Elizabeth PA-C   ketorolac (TORADOL) 10 MG tablet Take 1 tablet by mouth every 6 hours as needed for Pain 12/17/19   Serina Elizabeth PA-C   ipratropium-albuterol (DUONEB) 0.5-2.5 (3) MG/3ML SOLN nebulizer solution inhale contents of 1 vial ( 3 milliliters ) in nebulizer by mouth and INTO THE LUNGS

## 2020-03-10 ENCOUNTER — CARE COORDINATION (OUTPATIENT)
Dept: CARE COORDINATION | Age: 40
End: 2020-03-10

## 2020-03-25 ENCOUNTER — CARE COORDINATION (OUTPATIENT)
Dept: CARE COORDINATION | Age: 40
End: 2020-03-25

## 2020-03-26 NOTE — CARE COORDINATION
Ambulatory Care Coordination Note  3/25/2020  CM Risk Score: 5  Charlson 10 Year Mortality Risk Score: 4%     ACC: Milus Koyanagi, RN    Summary Note: call to Helen Scheuermann to discuss health coaching. Spoke with significant other. She reports he has not had any decline. Discussed d/c from care coordination due to has been stable. She is in agreement and is aware to report any decline. Reports compliance with medication        Care Coordination Interventions    Program Enrollment:  Rising Risk  Referral from Primary Care Provider:  Yes  Suggested Interventions and [id number]:  Declined  Pharmacist:  Not Started  Smoking Cessation:  Declined  Social Work:  Completed         Goals Addressed                 This Visit's Progress     COMPLETED: Conditions and Symptoms   No change     I will notify my provider of any symptoms that indicate a worsening of my condition. Barriers: lack of support and stress  Plan for overcoming my barriers: care coordination, referral msw  Confidence: 6/10  Anticipated Goal Completion Date: 4/20/2020              Prior to Admission medications    Medication Sig Start Date End Date Taking? Authorizing Provider   gabapentin (NEURONTIN) 100 MG capsule Take 1 capsule by mouth nightly for 90 days.  2/17/20 5/17/20  Serina Elizabeth PA-C   fluticasone-vilanterol (BREO ELLIPTA) 100-25 MCG/INH AEPB inhaler Inhale 1 puff into the lungs daily 1/20/20   Serina Elizabeth PA-C   FLOVENT HFA 44 MCG/ACT inhaler inhale 2 puffs by mouth and INTO THE LUNGS twice a day 12/31/19   Serina Elizabeth PA-C   potassium chloride (KLOR-CON M) 20 MEQ extended release tablet take 1 tablet by mouth twice a day 12/17/19   Serina Elizabeth PA-C   omeprazole (PRILOSEC) 40 MG delayed release capsule take 1 capsule by mouth once daily 12/17/19   Serina Elizabeth PA-C   ketorolac (TORADOL) 10 MG tablet Take 1 tablet by mouth every 6 hours as needed for Pain 12/17/19   Serina Elizabeth PA-C   ipratropium-albuterol (Arloa Nasir) 0.5-2.5 (3) MG/3ML SOLN nebulizer solution inhale contents of 1 vial ( 3 milliliters ) in nebulizer by mouth and INTO THE LUNGS every 4 hours if needed for wheezing 12/17/19   Serina Elizabeth PA-C   fexofenadine (ALLEGRA) 180 MG tablet take 1 tablet by mouth once daily 12/17/19   Serina Elizabeth PA-C   albuterol-ipratropium (COMBIVENT RESPIMAT)  MCG/ACT AERS inhaler Inhale 1 puff into the lungs every 4 hours as needed for Wheezing or Shortness of Breath 12/17/19   Serina Elizabeth PA-C   Nebulizers (AIRIAL COMPACT MINI NEBULIZER) MISC Use as directed  Dx: ICD-10-CM: J45.40     ICD-9-CM: 493.90  Patient not taking: Reported on 12/17/2019 3/21/17   Tony Chapa MD   Respiratory Therapy Supplies (NEBULIZER/TUBING/MOUTHPIECE) KIT 1 kit by Does not apply route daily as needed 11/2/15 11/4/15  Tony Chapa MD       Future Appointments   Date Time Provider Laura Armas   4/21/2020  1:45 PM Kimi More PA-C [address] [facility]

## 2020-07-08 ENCOUNTER — TELEPHONE (OUTPATIENT)
Dept: FAMILY MEDICINE CLINIC | Age: 40
End: 2020-07-08

## 2020-07-08 NOTE — TELEPHONE ENCOUNTER
Patient states he has a severe laceration to 3 of his fingers on the left hand with the index being the worst. He went to [facility] ED in [facility] and had 6 sutures placed. He was told to follow up with PCP for referral to specialist. States he was also not given pain medication. He notes that from the cut to the tip of the finger he does not have feeling. [facility] on [address]    No visits available until late next week.

## 2020-07-09 RX ORDER — HYDROCODONE BITARTRATE AND ACETAMINOPHEN 5; 325 MG/1; MG/1
1 TABLET ORAL EVERY 4 HOURS PRN
Qty: 18 TABLET | Refills: 0 | Status: SHIPPED | OUTPATIENT
Start: 2020-07-09 | End: 2020-07-24 | Stop reason: SDUPTHER

## 2020-07-09 NOTE — TELEPHONE ENCOUNTER
Will refer to Dr. Cherie King at [facility]. Needs to be seen ASAP with him. He will have to call the number as they are external.    Did they give him anything for pain like ibuprofen? ???

## 2020-07-09 NOTE — TELEPHONE ENCOUNTER
Called and patient aware with needing a follow up with Sigifredo Velazquez at [facility] and gave him the number to schedule appointment. Patient is schedule for tomorrow at 2:00pm and the patient was wondering if there could be something called in for pain as the ED didn't send him home with anything.

## 2020-07-10 ENCOUNTER — OFFICE VISIT (OUTPATIENT)
Dept: FAMILY MEDICINE CLINIC | Age: 40
End: 2020-07-10
Payer: COMMERCIAL

## 2020-07-10 VITALS
TEMPERATURE: 98 F | DIASTOLIC BLOOD PRESSURE: 68 MMHG | WEIGHT: 160 LBS | HEART RATE: 71 BPM | RESPIRATION RATE: 16 BRPM | OXYGEN SATURATION: 99 % | HEIGHT: 68 IN | BODY MASS INDEX: 24.25 KG/M2 | SYSTOLIC BLOOD PRESSURE: 122 MMHG

## 2020-07-10 PROCEDURE — 4004F PT TOBACCO SCREEN RCVD TLK: CPT | Performed by: PHYSICIAN ASSISTANT

## 2020-07-10 PROCEDURE — G8427 DOCREV CUR MEDS BY ELIG CLIN: HCPCS | Performed by: PHYSICIAN ASSISTANT

## 2020-07-10 PROCEDURE — 99214 OFFICE O/P EST MOD 30 MIN: CPT | Performed by: PHYSICIAN ASSISTANT

## 2020-07-10 PROCEDURE — G8420 CALC BMI NORM PARAMETERS: HCPCS | Performed by: PHYSICIAN ASSISTANT

## 2020-07-10 ASSESSMENT — ENCOUNTER SYMPTOMS
CHEST TIGHTNESS: 0
COLOR CHANGE: 0

## 2020-07-10 NOTE — PROGRESS NOTES
Subjective  Santiago Fitting, 44 y.o. male presents today with:  Chief Complaint   Patient presents with   [facility] ED Follow-up     laceration to left index finger. HPI  In the office today for ED follow up. Segundo Bliss is in the office today for laceration/wound check. He sustained a laceration to his 1st, 2nd and 3rd digits on 7/7/2020 after helping a friend lay vintyl floor in [address]. He was working with a clean razor blade when he sliced through the palmar aspect of his 2nd digit. He sustain small knicks to 1st and 3rd digits. Per patient, he could see bone exposed. He presented to [facility] ER for evaluation. He was seen in ED. Would was cleaned and repaired using 3-0 nylon with 6 simple sutures. Tetnus was updated that day. Was told to follow up with ortho hand or plastic surgeon. Denies worsening pain, erythema, drainage or discharge from wound. Having pain--worse at night as he is trying to move it in his sleep. He was having a lot of pain/discomfort. I sent patient norco for PRN use. Pain is under control  Clean site with alcohol, leaving area open to air at night. Review of Systems   Constitutional: Positive for activity change. Respiratory: Negative for chest tightness. Cardiovascular: Negative for chest pain. Musculoskeletal: Positive for joint swelling. Skin: Positive for wound. Negative for color change and rash. Neurological: Positive for numbness.      Past Medical History:   Diagnosis Date    Acute stress disorder 12/17/2019    History of gallstones     Moderate persistent asthma without complication 54/75/7522     Past Surgical History:   Procedure Laterality Date    CHOLECYSTECTOMY  7/28/15    lap gabrielle with grams    ERCP  7/30/2015    ERCP with sphincterotomy and common bile duct sweep    ORCHIOPEXY Left     TESTICLE SURGERY  at 6 years    UPPER GASTROINTESTINAL ENDOSCOPY  11/11/15    w/bx      Social History     Socioeconomic History    Marital status: Single     Spouse name: Not on file    Number of children: Not on file    Years of education: Not on file    Highest education level: Not on file   Occupational History    Not on file   Social Needs    Financial resource strain: Not on file    Food insecurity     Worry: Not on file     Inability: Not on file    Transportation needs     Medical: Not on file     Non-medical: Not on file   Tobacco Use    Smoking status: Current Every Day Smoker     Packs/day: 0.50    Smokeless tobacco: Never Used   Substance and Sexual Activity    Alcohol use: No     Alcohol/week: 0.0 standard drinks    Drug use: Yes     Types: Marijuana    Sexual activity: Yes   Lifestyle    Physical activity     Days per week: Not on file     Minutes per session: Not on file    Stress: Not on file   Relationships    Social connections     Talks on phone: Not on file     Gets together: Not on file     Attends Amish service: Not on file     Active member of club or organization: Not on file     Attends meetings of clubs or organizations: Not on file     Relationship status: Not on file    Intimate partner violence     Fear of current or ex partner: Not on file     Emotionally abused: Not on file     Physically abused: Not on file     Forced sexual activity: Not on file   Other Topics Concern    Not on file   Social History Narrative    Not on file     Family History   Problem Relation Age of Onset    Asthma Mother     COPD Mother     Lung Cancer Father     Cancer Paternal Uncle         Lung     No Known Allergies  Current Outpatient Medications   Medication Sig Dispense Refill    HYDROcodone-acetaminophen (NORCO) 5-325 MG per tablet Take 1 tablet by mouth every 4 hours as needed for Pain for up to 3 days. Intended supply: 3 days.  Take lowest dose possible to manage pain 18 tablet 0    ipratropium-albuterol (DUONEB) 0.5-2.5 (3) MG/3ML SOLN nebulizer solution inhale contents of 1 vial ( 3 milliliters ) in nebulizer by mouth and INTO THE LUNGS every 4 hours if needed for wheezing 360 mL 1    omeprazole (PRILOSEC) 40 MG delayed release capsule take 1 capsule by mouth once daily 30 capsule 2    potassium chloride (KLOR-CON M) 20 MEQ extended release tablet take 1 tablet by mouth twice a day 60 tablet 2    ketorolac (TORADOL) 10 MG tablet Take 1 tablet by mouth every 6 hours as needed for Pain 20 tablet 0    fexofenadine (ALLEGRA) 180 MG tablet take 1 tablet by mouth once daily 30 tablet 1    albuterol-ipratropium (COMBIVENT RESPIMAT)  MCG/ACT AERS inhaler Inhale 1 puff into the lungs every 4 hours as needed for Wheezing or Shortness of Breath 2 Inhaler 5    Nebulizers (AIRIAL COMPACT MINI NEBULIZER) MISC Use as directed  Dx: ICD-10-CM: J45.40     ICD-9-CM: 493.90 1 each 0     No current facility-administered medications for this visit. PMH, Surgical Hx, Family Hx, and Social Hx reviewed and updated. Health Maintenance reviewed. Objective  Vitals:    07/10/20 1335   BP: 122/68   Site: Right Upper Arm   Position: Sitting   Cuff Size: Medium Adult   Pulse: 71   Resp: 16   Temp: 98 °F (36.7 °C)   TempSrc: Temporal   SpO2: 99%   Weight: 160 lb (72.6 kg)   Height: 5' 8\" (1.727 m)     BP Readings from Last 3 Encounters:   07/10/20 122/68   01/20/20 118/82   12/17/19 120/80     Wt Readings from Last 3 Encounters:   07/10/20 160 lb (72.6 kg)   01/20/20 164 lb (74.4 kg)   12/17/19 162 lb 9.6 oz (73.8 kg)     Physical Exam  Vitals signs reviewed. Constitutional:       Appearance: Normal appearance. Neck:      Musculoskeletal: Normal range of motion. Cardiovascular:      Rate and Rhythm: Normal rate. Pulmonary:      Effort: Pulmonary effort is normal.   Musculoskeletal:         General: Tenderness and deformity present. Left hand: He exhibits decreased range of motion, tenderness, laceration and swelling. Decreased sensation noted. Skin:     General: Skin is warm and dry.       Findings: Laceration (palmar surface of L index finger, about 3 cm in length. 6 simple knot sutures noted. No signs of drainage, erythema, wound dehisience ) present. Neurological:      Mental Status: He is alert. Cahse Griffith was seen today for ed follow-up. Diagnoses and all orders for this visit:    Laceration of finger of left hand without foreign body with damage to nail, unspecified finger, initial encounter  -     [facility] [place]    Left hand pain  -     [address], [facility] ED note. Continue good wound care. [facility] Referral.   Return in 2 weeks to me for follow up. Call with any questions or concerns. Orders Placed This Encounter   Procedures   [facility] [place]     Referral Priority:   Routine     Referral Type:   Eval and Treat     Referral Reason:   Specialty Services Required     Requested Specialty:   Orthopedic Surgery     Number of Visits Requested:   1     No orders of the defined types were placed in this encounter. Medications Discontinued During This Encounter   Medication Reason    gabapentin (NEURONTIN) 100 MG capsule LIST CLEANUP    FLOVENT HFA 44 MCG/ACT inhaler LIST CLEANUP    fluticasone-vilanterol (BREO ELLIPTA) 100-25 MCG/INH AEPB inhaler LIST CLEANUP     No follow-ups on file. Reviewed with the patient: current clinical status, medications, activities and diet. Side effects, adverse effects of the medication prescribed today, as well as treatment plan/ rationale and result expectations have been discussed with the patient who expresses understanding and desires to proceed. Close follow up to evaluate treatment results and for coordination of care. I have reviewed the patient's medical history in detail and updated the computerized patient record.     Serina Elizabeth PA-C

## 2020-07-14 ENCOUNTER — OFFICE VISIT (OUTPATIENT)
Dept: ORTHOPEDIC SURGERY | Age: 40
End: 2020-07-14
Payer: COMMERCIAL

## 2020-07-14 VITALS
OXYGEN SATURATION: 99 % | BODY MASS INDEX: 24.25 KG/M2 | HEIGHT: 68 IN | TEMPERATURE: 97.3 F | HEART RATE: 72 BPM | WEIGHT: 160 LBS

## 2020-07-14 PROBLEM — S64.491A INJURY OF DIGITAL NERVE OF LEFT INDEX FINGER: Status: ACTIVE | Noted: 2020-07-14

## 2020-07-14 PROBLEM — S61.201A OPEN WOUND OF LEFT INDEX FINGER WITHOUT DAMAGE TO NAIL: Status: ACTIVE | Noted: 2020-07-14

## 2020-07-14 PROCEDURE — G8420 CALC BMI NORM PARAMETERS: HCPCS | Performed by: ORTHOPAEDIC SURGERY

## 2020-07-14 PROCEDURE — G8427 DOCREV CUR MEDS BY ELIG CLIN: HCPCS | Performed by: ORTHOPAEDIC SURGERY

## 2020-07-14 PROCEDURE — 99203 OFFICE O/P NEW LOW 30 MIN: CPT | Performed by: ORTHOPAEDIC SURGERY

## 2020-07-14 PROCEDURE — 4004F PT TOBACCO SCREEN RCVD TLK: CPT | Performed by: ORTHOPAEDIC SURGERY

## 2020-07-14 ASSESSMENT — ENCOUNTER SYMPTOMS
ABDOMINAL DISTENTION: 0
CHEST TIGHTNESS: 0

## 2020-07-14 NOTE — PROGRESS NOTES
Subjective:      Patient ID: Grecia Lopez is a 44 y.o. male who presents today for:  Chief Complaint   Patient presents with    Finger Injury     pt states he cut his finger on July 7, 2020 putting in ian, pt rates pain 7/10 on a pain scale. pt seen in ED and stitches where applied to pointer left finger. HPI  Patient cut his finger approximately a week ago and is now following up in the office.   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    Moderate persistent asthma without complication 25/25/4234     Past Surgical History:   Procedure Laterality Date    CHOLECYSTECTOMY  7/28/15    lap gabrielle with grams    ERCP  7/30/2015    ERCP with sphincterotomy and common bile duct sweep    ORCHIOPEXY Left     TESTICLE SURGERY  at 6 years    UPPER GASTROINTESTINAL ENDOSCOPY  11/11/15    w/bx      Social History     Socioeconomic History    Marital status: Single     Spouse name: Not on file    Number of children: Not on file    Years of education: Not on file    Highest education level: Not on file   Occupational History    Not on file   Social Needs    Financial resource strain: Not on file    Food insecurity     Worry: Not on file     Inability: Not on file   [employer] needs     Medical: Not on file     Non-medical: Not on file   Tobacco Use    Smoking status: Current Every Day Smoker     Packs/day: 0.50    Smokeless tobacco: Never Used   Substance and Sexual Activity    Alcohol use: No     Alcohol/week: 0.0 standard drinks    Drug use: Yes     Types: Marijuana    Sexual activity: Yes   Lifestyle    Physical activity     Days per week: Not on file     Minutes per session: Not on file    Stress: Not on file Relationships    Social connections     Talks on phone: Not on file     Gets together: Not on file     Attends Taoist service: Not on file     Active member of club or organization: Not on file     Attends meetings of clubs or organizations: Not on file     Relationship status: Not on file    Intimate partner violence     Fear of current or ex partner: Not on file     Emotionally abused: Not on file     Physically abused: Not on file     Forced sexual activity: Not on file   Other Topics Concern    Not on file   Social History Narrative    Not on file     Family History   Problem Relation Age of Onset    Asthma Mother     COPD Mother     Lung Cancer Father     Cancer Paternal Uncle         Lung     No Known Allergies  Current Outpatient Medications on File Prior to Visit   Medication Sig Dispense Refill    omeprazole (PRILOSEC) 40 MG delayed release capsule take 1 capsule by mouth once daily 30 capsule 2    potassium chloride (KLOR-CON M) 20 MEQ extended release tablet take 1 tablet by mouth twice a day (Patient taking differently: Indications: taking once a day take 1 tablet by mouth twice a day) 60 tablet 2    fexofenadine (ALLEGRA) 180 MG tablet take 1 tablet by mouth once daily 30 tablet 1    albuterol-ipratropium (COMBIVENT RESPIMAT)  MCG/ACT AERS inhaler Inhale 1 puff into the lungs every 4 hours as needed for Wheezing or Shortness of Breath 2 Inhaler 5    Nebulizers (AIRIAL COMPACT MINI NEBULIZER) MISC Use as directed  Dx: ICD-10-CM: J45.40     ICD-9-CM: 493.90 1 each 0    [DISCONTINUED] Respiratory Therapy Supplies (NEBULIZER/TUBING/MOUTHPIECE) KIT 1 kit by Does not apply route daily as needed 1 kit 0     No current facility-administered medications on file prior to visit. Controlled Substance Monitoring:    Acute and Chronic Pain Monitoring:   No flowsheet data found. Review of Systems   Constitutional: Negative for activity change and appetite change.    Respiratory: Negative for chest tightness. Cardiovascular: Negative for chest pain. Gastrointestinal: Negative for abdominal distention. Genitourinary: Negative for difficulty urinating. Neurological: Negative for headaches. Objective:   Pulse 72   Temp 97.3 °F (36.3 °C) (Temporal)   Ht 5' 8\" (1.727 m)   Wt 160 lb (72.6 kg)   SpO2 99%   BMI 24.33 kg/m²     Physical Exam:  Left hand  Is approximately a 3 cm laceration to the palmar aspect of the left index finger  This is well approximated and sutured  There are no foreign bodies noted in this area  There is decreased sensations noticed over the tip of the index finger as well as along the middle phalanx  1 cm laceration is noticed over the ring finger proximal to the proximal phalanx over the palmar aspect  No redness or erythema in the fingers  Clear refill is good in all the fingers    Diagnostic Imaging:    No x-rays were available for viewing    Assessment:       Diagnosis Orders   1. Open wound of left index finger without damage to nail, initial encounter     2. Injury of digital nerve of left index finger, initial encounter           Plan:        Patient was explained the treatment options for the left index finger    Digital nerve which has been lacerated would require repair to regain full sensations of the tip of the finger  Surgical procedure was explained as well as a period of immobilization to let the nerve heal have been explained  Risks and benefits of this procedure including risk of infection, failure of the nerve to regenerate, neuroma formation, possible need for further surgery, have all been discussed  Patient consents for the surgery and will be seen in the operating room in the next 7  days  No orders of the defined types were placed in this encounter. No orders of the defined types were placed in this encounter. Return in about 1 week (around 7/21/2020).       Murtaza Syed MD

## 2020-07-21 ENCOUNTER — TELEPHONE (OUTPATIENT)
Dept: FAMILY MEDICINE CLINIC | Age: 40
End: 2020-07-21

## 2020-07-21 ENCOUNTER — OFFICE VISIT (OUTPATIENT)
Dept: ORTHOPEDIC SURGERY | Age: 40
End: 2020-07-21
Payer: COMMERCIAL

## 2020-07-21 VITALS — BODY MASS INDEX: 24.25 KG/M2 | HEIGHT: 68 IN | WEIGHT: 160 LBS

## 2020-07-21 PROCEDURE — 99214 OFFICE O/P EST MOD 30 MIN: CPT | Performed by: PHYSICIAN ASSISTANT

## 2020-07-21 PROCEDURE — 4004F PT TOBACCO SCREEN RCVD TLK: CPT | Performed by: PHYSICIAN ASSISTANT

## 2020-07-21 PROCEDURE — G8420 CALC BMI NORM PARAMETERS: HCPCS | Performed by: PHYSICIAN ASSISTANT

## 2020-07-21 PROCEDURE — G8427 DOCREV CUR MEDS BY ELIG CLIN: HCPCS | Performed by: PHYSICIAN ASSISTANT

## 2020-07-21 ASSESSMENT — ENCOUNTER SYMPTOMS
STRIDOR: 0
BACK PAIN: 0
VOMITING: 0
SHORTNESS OF BREATH: 0
WHEEZING: 0
COLOR CHANGE: 0
DIARRHEA: 0
CONSTIPATION: 0
NAUSEA: 0

## 2020-07-21 NOTE — PROGRESS NOTES
[facility]    h&p  Subjective:      Chief Complaint   Patient presents with    Follow-up     suture removal left index finger       HPI: Hector Horta is a 44 y.o. male who is here for suture removal after suffering a finger laceration on the second left PIPJ. Laceration looks good, sutures removed. He has residual numbness over the lateral aspect from his PIPJ to the tip. No issues with flexion or extension, laceration is on the palmar side. Spoke with Dr. Son Mcneal and the patient in regards to proceeding with surgery scheduled for next Tuesday for digital nerve repair at the site of his laceration. Patient understands that if we did not intervene that this numbness would likely be lifelong. He also is understanding that there is not 100% guarantee that we will fix his numbness with the surgery. He understands of there is risk of infection and even death with surgery. In terms of his overall health he has moderate asthma and is an admit smoker. His asthma is not very well controlled right now, says that he wheezes frequently as of late. He denies any heart issues, no chest pain or shortness of breath. No recent sick contacts or exposure to [place]. He drinks socially. He is not on any drugs. He is on any blood thinners.     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    Moderate persistent asthma without complication 87/45/3288      Past Surgical History:   Procedure Laterality Date    CHOLECYSTECTOMY  7/28/15    lap gabrielle with grams    ERCP  7/30/2015    ERCP with sphincterotomy and common bile duct sweep    ORCHIOPEXY Left     TESTICLE SURGERY  at 6 years    UPPER GASTROINTESTINAL ENDOSCOPY  11/11/15    w/bx      Social History     Socioeconomic History    Marital status: Single     Spouse name: Not on file    Number of children: Not on file    Years of education: Not on file    Highest education level: Not on file   Occupational History    Not on file   Social Needs    Financial resource strain: Not on file    Food insecurity     Worry: Not on file     Inability: Not on file    Transportation needs     Medical: Not on file     Non-medical: Not on file   Tobacco Use    Smoking status: Current Every Day Smoker     Packs/day: 0.50    Smokeless tobacco: Never Used   Substance and Sexual Activity    Alcohol use: No     Alcohol/week: 0.0 standard drinks    Drug use: Yes     Types: Marijuana    Sexual activity: Yes   Lifestyle    Physical activity     Days per week: Not on file     Minutes per session: Not on file    Stress: Not on file   Relationships    Social connections     Talks on phone: Not on file     Gets together: Not on file     Attends Sikh service: Not on file     Active member of club or organization: Not on file     Attends meetings of clubs or organizations: Not on file     Relationship status: Not on file    Intimate partner violence     Fear of current or ex partner: Not on file     Emotionally abused: Not on file     Physically abused: Not on file     Forced sexual activity: Not on file   Other Topics Concern    Not on file   Social History Narrative    Not on file     Family History   Problem Relation Age of Onset    Asthma Mother     COPD Mother     Lung Cancer Father     Cancer Paternal Uncle         Lung     No Known Allergies  Current Outpatient Medications on File Prior to Visit   Medication Sig Dispense Refill    ipratropium-albuterol (DUONEB) 0.5-2.5 (3) MG/3ML SOLN nebulizer solution inhale contents of 1 vial ( 3 milliliters ) in nebulizer by mouth and INTO THE LUNGS every 4 hours if needed for wheezing 360 mL 1    omeprazole (PRILOSEC) 40 MG delayed release capsule take 1 capsule by mouth once daily 30 capsule 2    potassium chloride (KLOR-CON M) 20 MEQ extended release tablet take 1 tablet by mouth twice a day 60 tablet 2    ketorolac (TORADOL) 10 MG tablet Take 1 tablet by mouth every 6 hours as needed for Pain 20 tablet 0    fexofenadine (ALLEGRA) 180 MG tablet take 1 tablet by mouth once daily 30 tablet 1    albuterol-ipratropium (COMBIVENT RESPIMAT)  MCG/ACT AERS inhaler Inhale 1 puff into the lungs every 4 hours as needed for Wheezing or Shortness of Breath 2 Inhaler 5    Nebulizers (AIRIAL COMPACT MINI NEBULIZER) MISC Use as directed  Dx: ICD-10-CM: J45.40     ICD-9-CM: 493.90 1 each 0    [DISCONTINUED] Respiratory Therapy Supplies (NEBULIZER/TUBING/MOUTHPIECE) KIT 1 kit by Does not apply route daily as needed 1 kit 0     No current facility-administered medications on file prior to visit. Review of Systems   Constitutional: Negative for appetite change and fever. Respiratory: Negative for shortness of breath, wheezing and stridor. Cardiovascular: Negative for chest pain and palpitations. Gastrointestinal: Negative for constipation, diarrhea, nausea and vomiting. Musculoskeletal: Negative for arthralgias, back pain, joint swelling, myalgias and neck pain. Skin: Negative for color change and rash. Neurological: Positive for numbness. Negative for dizziness, speech difficulty, weakness and light-headedness. Objective:   Ht 5' 8\" (1.727 m)   Wt 160 lb (72.6 kg)   BMI 24.33 kg/m²     General: WDWN, well appearing, in no distress   Skin: without breakdown, rash, normal in color see Ortho  Cardiopulmonary: Normal rhythm, no murmur or palpitations. Mild wheezing at the bases of the lungs. No barrel chesting. GI: Soft abdomen, No pain to palpation  Ortho Exam  Left hand: Well-healing laceration with sutures. Normal range of motion of all digits, especially at the site of laceration located on the index finger at the PIPJ. Paresthesia at the lateral aspect from the incision to the tip.   No issues medially    Radiographs and Laboratory Studies: Diagnostic Imaging Studies:    None to review    Laboratory Studies:   Lab Results   Component Value Date    WBC 10.4 12/17/2019    HGB 14.7 12/17/2019    HCT 42.6 12/17/2019    MCV 97.7 12/17/2019     12/17/2019     No results found for: SEDRATE  No results found for: CRP    Assessment and Plan:      Diagnosis Orders   1. Injury of digital nerve of left index finger, initial encounter       CBC and BMP were ordered. No need for EKG. He was instructed to quarantine after his cover test.  He will obtain these studies tomorrow. Came in today for suture removal.  Saw Dr. Anitha Theodore a week ago, unclear instructions in terms of follow-up. He has residual numbness over the lateral aspect from the PIPJ to the tip. Nothing medially. FDP FDS intact. Concern would be digital nerve laceration, follow-up with Dr. Loni Guerra in 2 weeks. The above plan was discussed in thorough detail with Dr. Lesly Sahrpe, Dr. Alisha Shafer, and the patient. No orders of the defined types were placed in this encounter. No orders of the defined types were placed in this encounter. No follow-ups on file.     Yohan Bautista PA-C  [facility]  [phone]

## 2020-07-24 ENCOUNTER — TELEPHONE (OUTPATIENT)
Dept: FAMILY MEDICINE CLINIC | Age: 40
End: 2020-07-24

## 2020-07-24 ENCOUNTER — OFFICE VISIT (OUTPATIENT)
Dept: FAMILY MEDICINE CLINIC | Age: 40
End: 2020-07-24
Payer: COMMERCIAL

## 2020-07-24 VITALS
HEIGHT: 68 IN | SYSTOLIC BLOOD PRESSURE: 122 MMHG | BODY MASS INDEX: 24.4 KG/M2 | HEART RATE: 64 BPM | OXYGEN SATURATION: 99 % | RESPIRATION RATE: 16 BRPM | TEMPERATURE: 97.8 F | DIASTOLIC BLOOD PRESSURE: 64 MMHG | WEIGHT: 161 LBS

## 2020-07-24 PROCEDURE — G8427 DOCREV CUR MEDS BY ELIG CLIN: HCPCS | Performed by: PHYSICIAN ASSISTANT

## 2020-07-24 PROCEDURE — G8420 CALC BMI NORM PARAMETERS: HCPCS | Performed by: PHYSICIAN ASSISTANT

## 2020-07-24 PROCEDURE — 4004F PT TOBACCO SCREEN RCVD TLK: CPT | Performed by: PHYSICIAN ASSISTANT

## 2020-07-24 PROCEDURE — 99214 OFFICE O/P EST MOD 30 MIN: CPT | Performed by: PHYSICIAN ASSISTANT

## 2020-07-24 RX ORDER — HYDROCODONE BITARTRATE AND ACETAMINOPHEN 5; 325 MG/1; MG/1
1 TABLET ORAL NIGHTLY PRN
Qty: 15 TABLET | Refills: 0 | Status: SHIPPED | OUTPATIENT
Start: 2020-07-24 | End: 2020-08-08

## 2020-07-24 RX ORDER — GABAPENTIN 100 MG/1
100 CAPSULE ORAL NIGHTLY
Qty: 30 CAPSULE | Refills: 0 | Status: SHIPPED | OUTPATIENT
Start: 2020-07-24 | End: 2020-08-27

## 2020-07-24 ASSESSMENT — PATIENT HEALTH QUESTIONNAIRE - PHQ9
SUM OF ALL RESPONSES TO PHQ QUESTIONS 1-9: 0
SUM OF ALL RESPONSES TO PHQ9 QUESTIONS 1 & 2: 0
2. FEELING DOWN, DEPRESSED OR HOPELESS: 0
1. LITTLE INTEREST OR PLEASURE IN DOING THINGS: 0
SUM OF ALL RESPONSES TO PHQ QUESTIONS 1-9: 0

## 2020-07-24 NOTE — TELEPHONE ENCOUNTER
Patient called in today and stated that the pharmacy would not fill the prescription but for only seven days worth. Patient stated that the pharmacy instructed for the provider write the prescription for taking one tablet twice daily so that way the patient insurance will cover the full script.

## 2020-07-24 NOTE — PROGRESS NOTES
Subjective  Parish Steven, 44 y.o. male presents today with:  Chief Complaint   Patient presents with    Hand Injury     2 week follow up      HPI  [facility] is in the office today for 2 week follow up. Last OV with me: 7/10/2020    Laceration. Follow up today. Patient saw Dr. Minerva Echeverria on 7/14/2020 for follow up for laceration to L index finger. He had follow up on 7/21/2020 with Angel Chase PA-C. He had sutures removed on 7/21/2020 with good signs of wound healing. He is having residual numbness/tingling along the lateral aspect from PIPJ to distal tip. [facility] is scheduled for surgery on 7/28/2020 for digital nerve repair. Having second thoughts about surgery. 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 He cannot take time off of work to recover from surgery. Would like to discuss today in the office. He has been working, denies weakness/decreased ROM of L index finger. Pain is worse at night with \"zinging\". Has been taking norco PRN in the evening time before bed as the pain will keep him up. Review of Systems   Constitutional: Negative for activity change, fatigue and fever. Respiratory: Negative for chest tightness. Cardiovascular: Negative for chest pain. Musculoskeletal: Positive for joint swelling. Negative for myalgias. Skin: Negative for color change, rash and wound. Neurological: Positive for numbness. Negative for weakness.      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    Moderate persistent asthma without complication 27/13/9739     Past Surgical History:   Procedure Laterality Date    CHOLECYSTECTOMY  7/28/15    lap gabrielle with grams    ERCP  7/30/2015    ERCP with sphincterotomy and common bile duct sweep    ORCHIOPEXY Left     TESTICLE SURGERY  at 6 years    UPPER GASTROINTESTINAL ENDOSCOPY  11/11/15    w/bx  Social History     Socioeconomic History    Marital status: Single     Spouse name: Not on file    Number of children: Not on file    Years of education: Not on file    Highest education level: Not on file   Occupational History    Not on file   Social Needs    Financial resource strain: Not on file    Food insecurity     Worry: Not on file     Inability: Not on file    Transportation needs     Medical: Not on file     Non-medical: Not on file   Tobacco Use    Smoking status: Current Every Day Smoker     Packs/day: 0.50    Smokeless tobacco: Never Used   Substance and Sexual Activity    Alcohol use: No     Alcohol/week: 0.0 standard drinks    Drug use: Yes     Types: Marijuana    Sexual activity: Yes   Lifestyle    Physical activity     Days per week: Not on file     Minutes per session: Not on file    Stress: Not on file   Relationships    Social connections     Talks on phone: Not on file     Gets together: Not on file     Attends Moravian service: Not on file     Active member of club or organization: Not on file     Attends meetings of clubs or organizations: Not on file     Relationship status: Not on file    Intimate partner violence     Fear of current or ex partner: Not on file     Emotionally abused: Not on file     Physically abused: Not on file     Forced sexual activity: Not on file   Other Topics Concern    Not on file   Social History Narrative    Not on file     Family History   Problem Relation Age of Onset    Asthma Mother     COPD Mother     Lung Cancer Father     Cancer Paternal Uncle         Lung     No Known Allergies  Current Outpatient Medications   Medication Sig Dispense Refill    HYDROcodone-acetaminophen (NORCO) 5-325 MG per tablet Take 1 tablet by mouth nightly as needed for Pain for up to 15 days. Intended supply: 15 days.  Take lowest dose possible to manage pain 15 tablet 0    gabapentin (NEURONTIN) 100 MG capsule Take 1 capsule by mouth nightly for 90 Visits Requested:   1     Orders Placed This Encounter   Medications    HYDROcodone-acetaminophen (NORCO) 5-325 MG per tablet     Sig: Take 1 tablet by mouth nightly as needed for Pain for up to 15 days. Intended supply: 15 days. Take lowest dose possible to manage pain     Dispense:  15 tablet     Refill:  0     Reduce doses taken as pain becomes manageable    gabapentin (NEURONTIN) 100 MG capsule     Sig: Take 1 capsule by mouth nightly for 90 days. Dispense:  30 capsule     Refill:  0     Medications Discontinued During This Encounter   Medication Reason    HYDROcodone-acetaminophen (NORCO) 5-325 MG per tablet REORDER    ketorolac (TORADOL) 10 MG tablet LIST CLEANUP     Return if symptoms worsen or fail to improve. Reviewed with the patient: current clinical status, medications, activities and diet. Side effects, adverse effects of the medication prescribed today, as well as treatment plan/ rationale and result expectations have been discussed with the patient who expresses understanding and desires to proceed. Close follow up to evaluate treatment results and for coordination of care. I have reviewed the patient's medical history in detail and updated the computerized patient record.     Serina Elizabeth PA-C

## 2020-07-25 PROBLEM — M79.642 LEFT HAND PAIN: Status: ACTIVE | Noted: 2020-07-25

## 2020-07-25 PROBLEM — S61.319A: Status: ACTIVE | Noted: 2020-07-14

## 2020-07-25 ASSESSMENT — ENCOUNTER SYMPTOMS
COLOR CHANGE: 0
CHEST TIGHTNESS: 0

## 2020-07-28 NOTE — TELEPHONE ENCOUNTER
Voicemail box not set up.   Closing patient will get as pharmacy provides since unable to get intouch with the patient

## 2020-10-02 ENCOUNTER — OFFICE VISIT (OUTPATIENT)
Dept: FAMILY MEDICINE CLINIC | Age: 40
End: 2020-10-02
Payer: COMMERCIAL

## 2020-10-02 ENCOUNTER — HOSPITAL ENCOUNTER (OUTPATIENT)
Dept: GENERAL RADIOLOGY | Age: 40
Discharge: HOME | End: 2020-10-04
Payer: COMMERCIAL

## 2020-10-02 ENCOUNTER — TELEPHONE (OUTPATIENT)
Dept: FAMILY MEDICINE CLINIC | Age: 40
End: 2020-10-02

## 2020-10-02 VITALS
TEMPERATURE: 98.2 F | HEART RATE: 89 BPM | DIASTOLIC BLOOD PRESSURE: 64 MMHG | WEIGHT: 163.6 LBS | RESPIRATION RATE: 16 BRPM | SYSTOLIC BLOOD PRESSURE: 122 MMHG | BODY MASS INDEX: 24.8 KG/M2 | HEIGHT: 68 IN

## 2020-10-02 PROCEDURE — 99213 OFFICE O/P EST LOW 20 MIN: CPT | Performed by: NURSE PRACTITIONER

## 2020-10-02 PROCEDURE — 4004F PT TOBACCO SCREEN RCVD TLK: CPT | Performed by: NURSE PRACTITIONER

## 2020-10-02 PROCEDURE — G8484 FLU IMMUNIZE NO ADMIN: HCPCS | Performed by: NURSE PRACTITIONER

## 2020-10-02 PROCEDURE — G8419 CALC BMI OUT NRM PARAM NOF/U: HCPCS | Performed by: NURSE PRACTITIONER

## 2020-10-02 PROCEDURE — G8427 DOCREV CUR MEDS BY ELIG CLIN: HCPCS | Performed by: NURSE PRACTITIONER

## 2020-10-02 PROCEDURE — 73030 X-RAY EXAM OF SHOULDER: CPT

## 2020-10-02 RX ORDER — KETOROLAC TROMETHAMINE 30 MG/ML
60 INJECTION, SOLUTION INTRAMUSCULAR; INTRAVENOUS ONCE
Status: DISCONTINUED | OUTPATIENT
Start: 2020-10-02 | End: 2020-10-02

## 2020-10-02 RX ORDER — CYCLOBENZAPRINE HCL 10 MG
10 TABLET ORAL 3 TIMES DAILY PRN
Qty: 15 TABLET | Refills: 0 | Status: SHIPPED | OUTPATIENT
Start: 2020-10-02 | End: 2022-04-18

## 2020-10-02 RX ORDER — NAPROXEN SODIUM 550 MG/1
550 TABLET ORAL 2 TIMES DAILY WITH MEALS
Qty: 15 TABLET | Refills: 3 | Status: SHIPPED | OUTPATIENT
Start: 2020-10-02 | End: 2020-11-03

## 2020-10-02 ASSESSMENT — ENCOUNTER SYMPTOMS
BACK PAIN: 0
RHINORRHEA: 0
SHORTNESS OF BREATH: 0
NAUSEA: 0
VOMITING: 0
ABDOMINAL PAIN: 0
WHEEZING: 0
COUGH: 0
SORE THROAT: 0

## 2020-10-02 NOTE — PROGRESS NOTES
Subjective  [place] Orestes, 44 y.o. male presents today with:  Chief Complaint   Patient presents with    Arm Injury     Pt states he is having pain in his right arm and is unable to move the arm states he was moving his stove and thinks he might have pulled a muscle in the arm        Shoulder Pain    The pain is present in the right shoulder. This is a new problem. The current episode started in the past 7 days (Monday). The problem occurs constantly. The problem has been unchanged. The quality of the pain is described as aching. The pain is at a severity of 6/10. The pain is moderate. Associated symptoms include a limited range of motion and stiffness. Pertinent negatives include no fever, joint swelling or numbness. The symptoms are aggravated by activity. He has tried OTC pain meds for the symptoms. The treatment provided no relief. Review of Systems   Constitutional: Negative for chills, fatigue and fever. HENT: Negative for congestion, ear pain, rhinorrhea and sore throat. Respiratory: Negative for cough, shortness of breath and wheezing. Cardiovascular: Negative for chest pain. Gastrointestinal: Negative for abdominal pain, nausea and vomiting. Genitourinary: Negative for dysuria and flank pain. Musculoskeletal: Positive for neck pain (Right shoulder pain) and stiffness. Negative for back pain. Neurological: Negative for numbness and headaches. Psychiatric/Behavioral: Negative for suicidal ideas.        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    Moderate persistent asthma without complication 24/10/8010     Past Surgical History:   Procedure Laterality Date    CHOLECYSTECTOMY  7/28/15    lap gabrielle with grams    ERCP  7/30/2015    ERCP with sphincterotomy and common bile duct sweep    ORCHIOPEXY Left     TESTICLE SURGERY  at 6 years    UPPER GASTROINTESTINAL ENDOSCOPY  11/11/15    w/bx      Social History     Socioeconomic History    Marital status: Single     Spouse name: Not on file    Number of children: Not on file    Years of education: Not on file    Highest education level: Not on file   Occupational History    Not on file   Social Needs    Financial resource strain: Not on file    Food insecurity     Worry: Not on file     Inability: Not on file    Transportation needs     Medical: Not on file     Non-medical: Not on file   Tobacco Use    Smoking status: Current Every Day Smoker     Packs/day: 0.50    Smokeless tobacco: Never Used   Substance and Sexual Activity    Alcohol use: No     Alcohol/week: 0.0 standard drinks    Drug use: Yes     Types: Marijuana    Sexual activity: Yes   Lifestyle    Physical activity     Days per week: Not on file     Minutes per session: Not on file    Stress: Not on file   Relationships    Social connections     Talks on phone: Not on file     Gets together: Not on file     Attends Gnosticist service: Not on file     Active member of club or organization: Not on file     Attends meetings of clubs or organizations: Not on file     Relationship status: Not on file    Intimate partner violence     Fear of current or ex partner: Not on file     Emotionally abused: Not on file     Physically abused: Not on file     Forced sexual activity: Not on file   Other Topics Concern    Not on file   Social History Narrative    Not on file     Family History   Problem Relation Age of Onset    Asthma Mother     COPD Mother     Lung Cancer Father     Cancer Paternal Uncle         Lung     No Known Allergies  Current Outpatient Medications   Medication Sig Dispense Refill    cyclobenzaprine (FLEXERIL) 10 MG tablet Take 1 tablet by mouth 3 times daily as needed for Muscle spasms Start with 1/2 tab. Do not drive or operate machinery while taking muscle relaxers.  15 tablet 0    naproxen sodium (ANAPROX) 550 MG tablet Take 1 tablet by mouth 2 times daily (with meals) for 15 days Take for 3 days then as needed. 15 tablet 3    omeprazole (PRILOSEC) 40 MG delayed release capsule take 1 capsule by mouth once daily 30 capsule 2    ipratropium-albuterol (DUONEB) 0.5-2.5 (3) MG/3ML SOLN nebulizer solution inhale contents of 1 vial ( 3 milliliters ) in nebulizer by mouth and INTO THE LUNGS every 4 hours if needed for wheezing 360 mL 1    gabapentin (NEURONTIN) 100 MG capsule take 1 capsule by mouth nightly 30 capsule 2    potassium chloride (KLOR-CON M) 20 MEQ extended release tablet take 1 tablet by mouth twice a day (Patient taking differently: Indications: taking once a day take 1 tablet by mouth twice a day) 60 tablet 2    fexofenadine (ALLEGRA) 180 MG tablet take 1 tablet by mouth once daily 30 tablet 1    albuterol-ipratropium (COMBIVENT RESPIMAT)  MCG/ACT AERS inhaler Inhale 1 puff into the lungs every 4 hours as needed for Wheezing or Shortness of Breath 2 Inhaler 5    Nebulizers (AIRIAL COMPACT MINI NEBULIZER) MISC Use as directed  Dx: ICD-10-CM: J45.40     ICD-9-CM: 493.90 1 each 0     Current Facility-Administered Medications   Medication Dose Route Frequency Provider Last Rate Last Dose    ketorolac (TORADOL) injection 60 mg  60 mg Intramuscular Once Richard Dykes RN, NP         PMH, Surgical Hx, Family Hx, and Social Hxreviewed and updated. Health Maintenance reviewed. Objective    Vitals:    10/02/20 1438   BP: 122/64   Site: Left Upper Arm   Position: Sitting   Cuff Size: Medium Adult   Pulse: 89   Resp: 16   Temp: 98.2 °F (36.8 °C)   TempSrc: Oral   Weight: 163 lb 9.6 oz (74.2 kg)   Height: 5' 7.5\" (1.715 m)       Physical Exam  Vitals signs and nursing note reviewed. Constitutional:       Appearance: He is well-developed.    HENT:      Right Ear: External ear normal.      Left Ear: External ear normal.      Nose: Nose normal.   Eyes:      Pupils: Pupils are equal, round, and reactive to light.   Neck:      Musculoskeletal: Normal range of motion. Cardiovascular:      Rate and Rhythm: Normal rate and regular rhythm. Heart sounds: Normal heart sounds. Pulmonary:      Effort: Pulmonary effort is normal. No respiratory distress. Breath sounds: Normal breath sounds. No wheezing. Abdominal:      General: Bowel sounds are normal.      Palpations: Abdomen is soft. There is no mass. Tenderness: There is no abdominal tenderness. Musculoskeletal:         General: Tenderness and signs of injury present. Right shoulder: He exhibits decreased range of motion, tenderness, pain and spasm. He exhibits no bony tenderness, no swelling, no effusion and no deformity. Lymphadenopathy:      Cervical: No cervical adenopathy. Skin:     General: Skin is warm and dry. Capillary Refill: Capillary refill takes less than 2 seconds. Findings: No bruising or erythema. Neurological:      Mental Status: He is alert and oriented to person, place, and time. Deep Tendon Reflexes: Reflexes normal.   Psychiatric:         Thought Content: Thought content normal.         Assessment & Plan   Education discussed:  Use ice 20 minutes 4-5 times a day and use a heating pad 20 minutes 4-5 times a day. Use the anaprox for 3 days the as needed. Get the x-ray today. If no improvement in 1 week follow up with orthopedics. Diagnosis Orders   1. Injury of right shoulder, initial encounter  XR SHOULDER RIGHT (MIN 2 VIEWS)     Orders Placed This Encounter   Procedures    XR SHOULDER RIGHT (MIN 2 VIEWS)     Standing Status:   Future     Standing Expiration Date:   10/2/2021     Order Specific Question:   Reason for exam:     Answer:   ro fx or dislocation     Orders Placed This Encounter   Medications    cyclobenzaprine (FLEXERIL) 10 MG tablet     Sig: Take 1 tablet by mouth 3 times daily as needed for Muscle spasms Start with 1/2 tab.  Do not drive or operate machinery while taking muscle relaxers. Dispense:  15 tablet     Refill:  0    naproxen sodium (ANAPROX) 550 MG tablet     Sig: Take 1 tablet by mouth 2 times daily (with meals) for 15 days Take for 3 days then as needed. Dispense:  15 tablet     Refill:  3    ketorolac (TORADOL) injection 60 mg     There are no discontinued medications. Return if symptoms worsen or fail to improve. Reviewed with the patient: current clinical status, medications, activities and diet. Side effects, adverse effects of the medication prescribed today, as well as treatment plan/ rationale and resultexpectations have been discussed with the patient who expresses understanding and desires to proceed. Close follow up to evaluate treatment resultsand for coordination of care. I have reviewed the patient's medical history in detail and updated the computerized patient record.     Zeyad Hinds RN, NP

## 2020-10-02 NOTE — PATIENT INSTRUCTIONS
Use ice 20 minutes 4-5 times a day and use a heating pad 20 minutes 4-5 times a day. Use the anaprox for 3 days the as needed. Get the x-ray today. If no improvement in 1 week follow up with orthopedics.

## 2020-10-02 NOTE — TELEPHONE ENCOUNTER
Looks like he was seen at [facility] walk in. My apologies, I just saw this message.

## 2020-10-02 NOTE — TELEPHONE ENCOUNTER
Patient calling to request pain medication to be sent to his pharmacy [facility], [facility]) because he pulled a muscle in his shoulder and he is in a lot of pain. I offered him the [address] but stated that he doesn't have transportation to get here. Please advise and thanks!

## 2020-10-07 ENCOUNTER — TELEPHONE (OUTPATIENT)
Dept: FAMILY MEDICINE CLINIC | Age: 40
End: 2020-10-07

## 2020-10-07 RX ORDER — PREDNISONE 10 MG/1
TABLET ORAL
Qty: 51 TABLET | Refills: 0 | Status: SHIPPED | OUTPATIENT
Start: 2020-10-07 | End: 2020-10-17

## 2020-10-07 NOTE — TELEPHONE ENCOUNTER
Patient states that she was seen in the Walk-in yesterday and he still has a lot of arm pain and the medication he was given is not working. Can something else be sent in?  [facility] on [address]

## 2020-10-12 NOTE — TELEPHONE ENCOUNTER
Patient said he is taking and it is not helping he still has 7 days but when he goes Number 2 he is having some rectal bleeding. But he does have hemroids.

## 2020-10-12 NOTE — TELEPHONE ENCOUNTER
The rectal bleeding is most likely from the hemorrhoids. Is he straining? Hard stool? Needs follow up with me for the shoulder.

## 2020-10-13 ENCOUNTER — OFFICE VISIT (OUTPATIENT)
Dept: FAMILY MEDICINE CLINIC | Age: 40
End: 2020-10-13
Payer: COMMERCIAL

## 2020-10-13 VITALS
TEMPERATURE: 98.2 F | HEART RATE: 84 BPM | OXYGEN SATURATION: 98 % | SYSTOLIC BLOOD PRESSURE: 122 MMHG | WEIGHT: 167.6 LBS | BODY MASS INDEX: 25.4 KG/M2 | HEIGHT: 68 IN | RESPIRATION RATE: 16 BRPM | DIASTOLIC BLOOD PRESSURE: 78 MMHG

## 2020-10-13 DIAGNOSIS — E87.6 HYPOKALEMIA: ICD-10-CM

## 2020-10-13 PROBLEM — M79.642 LEFT HAND PAIN: Status: RESOLVED | Noted: 2020-07-25 | Resolved: 2020-10-13

## 2020-10-13 PROBLEM — M25.511 ACUTE PAIN OF RIGHT SHOULDER: Status: ACTIVE | Noted: 2020-10-13

## 2020-10-13 PROBLEM — S46.311A STRAIN OF RIGHT TRICEPS: Status: ACTIVE | Noted: 2020-10-13

## 2020-10-13 PROBLEM — S61.319A: Status: RESOLVED | Noted: 2020-07-14 | Resolved: 2020-10-13

## 2020-10-13 LAB
ANION GAP SERPL CALCULATED.3IONS-SCNC: 16 MEQ/L (ref 9–15)
BASOPHILS ABSOLUTE: 0.1 K/UL (ref 0–0.2)
BASOPHILS RELATIVE PERCENT: 0.6 %
BUN BLDV-MCNC: 11 MG/DL (ref 6–20)
CALCIUM SERPL-MCNC: 9.5 MG/DL (ref 8.5–9.9)
CHLORIDE BLD-SCNC: 99 MEQ/L (ref 95–107)
CO2: 27 MEQ/L (ref 20–31)
CREAT SERPL-MCNC: 0.91 MG/DL (ref 0.7–1.2)
EOSINOPHILS ABSOLUTE: 0.2 K/UL (ref 0–0.7)
EOSINOPHILS RELATIVE PERCENT: 1.1 %
GFR AFRICAN AMERICAN: >60
GFR NON-AFRICAN AMERICAN: >60
GLUCOSE BLD-MCNC: 82 MG/DL (ref 70–99)
HCT VFR BLD CALC: 40.9 % (ref 42–52)
HEMOGLOBIN: 14.1 G/DL (ref 14–18)
LYMPHOCYTES ABSOLUTE: 5.3 K/UL (ref 1–4.8)
LYMPHOCYTES RELATIVE PERCENT: 27.7 %
MCH RBC QN AUTO: 32.6 PG (ref 27–31.3)
MCHC RBC AUTO-ENTMCNC: 34.4 % (ref 33–37)
MCV RBC AUTO: 94.7 FL (ref 80–100)
MONOCYTES ABSOLUTE: 1.4 K/UL (ref 0.2–0.8)
MONOCYTES RELATIVE PERCENT: 7.3 %
NEUTROPHILS ABSOLUTE: 12.1 K/UL (ref 1.4–6.5)
NEUTROPHILS RELATIVE PERCENT: 63.3 %
PDW BLD-RTO: 12.6 % (ref 11.5–14.5)
PLATELET # BLD: 448 K/UL (ref 130–400)
PLATELET SLIDE REVIEW: ABNORMAL
POTASSIUM SERPL-SCNC: 2.6 MEQ/L (ref 3.4–4.9)
RBC # BLD: 4.32 M/UL (ref 4.7–6.1)
RBC # BLD: NORMAL 10*6/UL
SODIUM BLD-SCNC: 142 MEQ/L (ref 135–144)
WBC # BLD: 19 K/UL (ref 4.8–10.8)

## 2020-10-13 PROCEDURE — 99214 OFFICE O/P EST MOD 30 MIN: CPT | Performed by: PHYSICIAN ASSISTANT

## 2020-10-13 PROCEDURE — G8419 CALC BMI OUT NRM PARAM NOF/U: HCPCS | Performed by: PHYSICIAN ASSISTANT

## 2020-10-13 PROCEDURE — G8427 DOCREV CUR MEDS BY ELIG CLIN: HCPCS | Performed by: PHYSICIAN ASSISTANT

## 2020-10-13 PROCEDURE — 4004F PT TOBACCO SCREEN RCVD TLK: CPT | Performed by: PHYSICIAN ASSISTANT

## 2020-10-13 PROCEDURE — G8484 FLU IMMUNIZE NO ADMIN: HCPCS | Performed by: PHYSICIAN ASSISTANT

## 2020-10-13 RX ORDER — POTASSIUM CHLORIDE 20 MEQ/1
TABLET, EXTENDED RELEASE ORAL
Qty: 60 TABLET | Refills: 2 | Status: SHIPPED | OUTPATIENT
Start: 2020-10-13 | End: 2020-10-14 | Stop reason: SDUPTHER

## 2020-10-13 RX ORDER — HYDROCODONE BITARTRATE AND ACETAMINOPHEN 5; 325 MG/1; MG/1
1 TABLET ORAL EVERY 8 HOURS PRN
Qty: 21 TABLET | Refills: 0 | Status: SHIPPED | OUTPATIENT
Start: 2020-10-13 | End: 2020-11-03 | Stop reason: SDUPTHER

## 2020-10-13 ASSESSMENT — ENCOUNTER SYMPTOMS
CHEST TIGHTNESS: 0
BACK PAIN: 1
WHEEZING: 0
SHORTNESS OF BREATH: 0
COUGH: 0

## 2020-10-13 NOTE — PROGRESS NOTES
Subjective  Hollis Mora, 44 y.o. male presents today with:  Chief Complaint   Patient presents with    Shoulder Pain     about 3 weeks, hurts from right side shoulder to elbow, sharp pains, numbness     HPI  Paul Abernathy is in the office today for follow up. Was seen in [facility] in on 10/2/2020 for RUE injury. RUE pain. Patient sustained an injury to RUE after he was moving his over to clean the floor. Initial injury happened about 2 weeks ago. Xray of R shoulder showed no acute abnormalities. He was prescribed flexeril 10 mg, TID PRN and naproxen 550 mg, BID. Paul Abernathy started medication without much relief. I changed patient to prednisone taper. He has been taking medication for about 5 days--pain is slowly starting to improve. Still having a lot of discomfort along the back of his RUE and across his shoulder blade. Denies weakness/numbness/tingling. Stretching RUE daily. Has not been working as he is R hand dominant and lifting aggravates that discomfort. Hypokalemia. Due to have K+ rechecked. Patient can go to lab today. Will take K+ supplement intermittently. Review of Systems   Constitutional: Positive for activity change. Negative for appetite change, chills, diaphoresis, fatigue and fever. Respiratory: Negative for cough, chest tightness, shortness of breath and wheezing. Cardiovascular: Negative for chest pain and palpitations. Musculoskeletal: Positive for arthralgias, back pain, myalgias and neck stiffness. Negative for gait problem, joint swelling and neck pain. Neurological: Negative for dizziness, weakness, light-headedness, numbness and headaches. Psychiatric/Behavioral: Positive for sleep disturbance. Negative for dysphoric mood. The patient is not nervous/anxious.       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    Moderate persistent asthma without complication 81/27/3798     Past Surgical History:   Procedure Laterality Date    CHOLECYSTECTOMY  7/28/15    lap gabrielle with grams    ERCP  7/30/2015    ERCP with sphincterotomy and common bile duct sweep    ORCHIOPEXY Left     TESTICLE SURGERY  at 6 years    UPPER GASTROINTESTINAL ENDOSCOPY  11/11/15    w/bx      Social History     Socioeconomic History    Marital status: Single     Spouse name: Not on file    Number of children: Not on file    Years of education: Not on file    Highest education level: Not on file   Occupational History    Not on file   Social Needs    Financial resource strain: Not on file    Food insecurity     Worry: Not on file     Inability: Not on file    Transportation needs     Medical: Not on file     Non-medical: Not on file   Tobacco Use    Smoking status: Current Every Day Smoker     Packs/day: 0.50    Smokeless tobacco: Never Used   Substance and Sexual Activity    Alcohol use: No     Alcohol/week: 0.0 standard drinks    Drug use: Yes     Types: Marijuana    Sexual activity: Yes   Lifestyle    Physical activity     Days per week: Not on file     Minutes per session: Not on file    Stress: Not on file   Relationships    Social connections     Talks on phone: Not on file     Gets together: Not on file     Attends Muslim service: Not on file     Active member of club or organization: Not on file     Attends meetings of clubs or organizations: Not on file     Relationship status: Not on file    Intimate partner violence     Fear of current or ex partner: Not on file     Emotionally abused: Not on file     Physically abused: Not on file     Forced sexual activity: Not on file   Other Topics Concern    Not on file   Social History Narrative    Not on file     Family History   Problem Relation Age of Onset    Asthma Mother     COPD Mother     Lung Cancer Father     Cancer Paternal Uncle         Lung     No Known Allergies  Current Outpatient Medications   Medication Sig Dispense Refill    HYDROcodone-acetaminophen (NORCO) 5-325 MG per tablet Take 1 tablet by mouth every 8 hours as needed for Pain for up to 7 days. Intended supply: 3 days. Take lowest dose possible to manage pain 21 tablet 0    diclofenac sodium (VOLTAREN) 1 % GEL Apply 2 g topically 4 times daily 150 g 2    predniSONE (DELTASONE) 10 MG tablet Take 6 tabs x 6 days, then 5 x 1, 4 x 1, 3 x 1, 2 x 1, 1 x 1 51 tablet 0    cyclobenzaprine (FLEXERIL) 10 MG tablet Take 1 tablet by mouth 3 times daily as needed for Muscle spasms Start with 1/2 tab. Do not drive or operate machinery while taking muscle relaxers. 15 tablet 0    naproxen sodium (ANAPROX) 550 MG tablet Take 1 tablet by mouth 2 times daily (with meals) for 15 days Take for 3 days then as needed. 15 tablet 3    omeprazole (PRILOSEC) 40 MG delayed release capsule take 1 capsule by mouth once daily 30 capsule 2    ipratropium-albuterol (DUONEB) 0.5-2.5 (3) MG/3ML SOLN nebulizer solution inhale contents of 1 vial ( 3 milliliters ) in nebulizer by mouth and INTO THE LUNGS every 4 hours if needed for wheezing 360 mL 1    potassium chloride (KLOR-CON M) 20 MEQ extended release tablet take 1 tablet by mouth twice a day (Patient taking differently: Indications: taking once a day take 1 tablet by mouth twice a day) 60 tablet 2    albuterol-ipratropium (COMBIVENT RESPIMAT)  MCG/ACT AERS inhaler Inhale 1 puff into the lungs every 4 hours as needed for Wheezing or Shortness of Breath 2 Inhaler 5    Nebulizers (AIRIAL COMPACT MINI NEBULIZER) MISC Use as directed  Dx: ICD-10-CM: J45.40     ICD-9-CM: 493.90 1 each 0     No current facility-administered medications for this visit. PMH, Surgical Hx, Family Hx, and Social Hx reviewed and updated. Health Maintenance reviewed.     Objective  Vitals:    10/13/20 1345   BP: 122/78   Site: Left Upper Arm   Position: Sitting   Cuff Size: Medium Adult   Pulse: 84   Resp: 16 Temp: 98.2 °F (36.8 °C)   TempSrc: Temporal   SpO2: 98%   Weight: 167 lb 9.6 oz (76 kg)   Height: 5' 8\" (1.727 m)     BP Readings from Last 3 Encounters:   10/13/20 122/78   10/02/20 122/64   07/24/20 122/64     Wt Readings from Last 3 Encounters:   10/13/20 167 lb 9.6 oz (76 kg)   10/02/20 163 lb 9.6 oz (74.2 kg)   07/24/20 161 lb (73 kg)     Physical Exam  Vitals signs reviewed. Constitutional:       Appearance: Normal appearance. He is normal weight. HENT:      Head: Normocephalic and atraumatic. Right Ear: External ear normal.      Left Ear: External ear normal.      Nose: Nose normal.      Mouth/Throat:      Mouth: Mucous membranes are moist.   Eyes:      Conjunctiva/sclera: Conjunctivae normal.   Cardiovascular:      Rate and Rhythm: Normal rate and regular rhythm. Pulmonary:      Effort: Pulmonary effort is normal.      Breath sounds: Normal breath sounds. Musculoskeletal:      Right shoulder: He exhibits tenderness, pain and spasm. He exhibits normal range of motion, no bony tenderness, no swelling, no effusion, no crepitus, normal pulse and normal strength. Left shoulder: Normal.        Arms:    Skin:     General: Skin is warm. Neurological:      General: No focal deficit present. Mental Status: He is alert and oriented to person, place, and time. Assessment & Plan   Lavelle Llanes was seen today for shoulder pain. Diagnoses and all orders for this visit:    Strain of right triceps, initial encounter  -     HYDROcodone-acetaminophen (NORCO) 5-325 MG per tablet; Take 1 tablet by mouth every 8 hours as needed for Pain for up to 7 days. Intended supply: 3 days. Take lowest dose possible to manage pain  -     [facility] [place]  -     diclofenac sodium (VOLTAREN) 1 % GEL; Apply 2 g topically 4 times daily    Acute pain of right shoulder  -     HYDROcodone-acetaminophen (NORCO) 5-325 MG per tablet;  Take 1 tablet by mouth every 8 hours as needed for Pain for up to 7 days. Intended supply: 3 days. Take lowest dose possible to manage pain  -     [facility] [place]  -     diclofenac sodium (VOLTAREN) 1 % GEL; Apply 2 g topically 4 times daily    Hypokalemia  -     CBC Auto Differential; Future  -     Basic Metabolic Panel; Future    BMI 25.0-25.9,adult  -     CBC Auto Differential; Future  -     Basic Metabolic Panel; Future    3 week follow up with me. Consider MRI of RUE/shoulder region if not improved. Call with any questions or concerns. Orders Placed This Encounter   Procedures    CBC Auto Differential     Standing Status:   Future     Number of Occurrences:   1     Standing Expiration Date:   10/13/2021    Basic Metabolic Panel     Standing Status:   Future     Number of Occurrences:   1     Standing Expiration Date:   10/13/2021   [facility] [place]     Referral Priority:   Routine     Referral Type:   Eval and Treat     Referral Reason:   Specialty Services Required     Requested Specialty:   Physical Therapy     Number of Visits Requested:   1     Orders Placed This Encounter   Medications    HYDROcodone-acetaminophen (NORCO) 5-325 MG per tablet     Sig: Take 1 tablet by mouth every 8 hours as needed for Pain for up to 7 days. Intended supply: 3 days. Take lowest dose possible to manage pain     Dispense:  21 tablet     Refill:  0     Reduce doses taken as pain becomes manageable    diclofenac sodium (VOLTAREN) 1 % GEL     Sig: Apply 2 g topically 4 times daily     Dispense:  150 g     Refill:  2     Medications Discontinued During This Encounter   Medication Reason    fexofenadine (ALLEGRA) 180 MG tablet LIST CLEANUP    gabapentin (NEURONTIN) 100 MG capsule LIST CLEANUP     No follow-ups on file. Reviewed with the patient: current clinical status, medications, activities and diet.      Side effects, adverse effects of the medication prescribed today, as well as treatment plan/ rationale and result expectations have been discussed with the patient who expresses understanding and desires to proceed. Close follow up to evaluate treatment results and for coordination of care. I have reviewed the patient's medical history in detail and updated the computerized patient record.     Serina Elizabeth PA-C

## 2020-10-13 NOTE — PATIENT INSTRUCTIONS
have secured the band. Hold one end of the band in each hand, with your hands at shoulder level. 3. Keep your elbows tucked in at your sides. 4. Slowly pull the bands down so your arms are straight at your sides. Make sure you keep your elbows close to your body as you pull down. 5. Return slowly to your starting position. 6. Repeat 8 to 12 times. Pulldown   1. Tie an exercise band so that there is a loop or a knot in the middle. Attach the loop to a secure object or shut a door on the knot to hold it in place. The band should be attached at a place that is above your head. 2. Stand so you face the place where you have secured the band. 3. Hold one end of the band in each hand. Your hands should be at about forehead level, with your arms stretched out in front of you. Hold your hands a little farther apart than the width of your shoulders. 4. Slowly pull the bands down to chest level so that your elbows are bent and your hands are at chest level. 5. Slowly return to your starting position. 6. Repeat 8 to 12 times. Follow-up care is a key part of your treatment and safety. Be sure to make and go to all appointments, and call your doctor if you are having problems. It's also a good idea to know your test results and keep a list of the medicines you take. Where can you learn more? Go to https://[email or web address]. org and sign in to your [employer] account. Enter B767 in the [facility] box to learn more about \"Triceps Tendinitis: Exercises. \"     If you do not have an account, please click on the \"Sign Up Now\" link. Current as of: March 2, 2020               Content Version: 12.6  © 5147-7535 [employer]. Care instructions adapted under license by [facility] ([facility]). If you have questions about a medical condition or this instruction, always ask your healthcare professional. [address] any warranty or liability for your use of this information.

## 2020-10-14 ENCOUNTER — TELEPHONE (OUTPATIENT)
Dept: ORTHOPEDIC SURGERY | Age: 40
End: 2020-10-14

## 2020-10-14 RX ORDER — POTASSIUM CHLORIDE 20 MEQ/1
TABLET, EXTENDED RELEASE ORAL
Qty: 60 TABLET | Refills: 2 | Status: SHIPPED | OUTPATIENT
Start: 2020-10-14 | End: 2021-04-06

## 2020-10-14 NOTE — TELEPHONE ENCOUNTER
Spoke to JOANNA about his potassium of 2.6. He says that this is a chronic problem for him. He is to take potassium pills at home and his primary care has been managing. He has been taking them lately. I told him to continue taking these pills.   He denies any palpitations lightheadedness, he is instructed to go to the emergency room if he felt any of those

## 2020-10-14 NOTE — TELEPHONE ENCOUNTER
Rx request   Requested Prescriptions     Pending Prescriptions Disp Refills    potassium chloride (KLOR-CON M) 20 MEQ extended release tablet 60 tablet 2     Sig: take 1 tablet by mouth twice a day     LOV 10/13/2020  Next Visit Date:  Future Appointments   Date Time Provider Laura Armas   10/20/2020  2:20 PM Margaux Mclaughlin, [address]   11/3/2020  1:30 PM L[facility]

## 2020-10-15 DIAGNOSIS — R79.89 ABNORMAL CBC: ICD-10-CM

## 2020-10-15 DIAGNOSIS — E87.6 HYPOKALEMIA: ICD-10-CM

## 2020-10-15 LAB
BASOPHILS ABSOLUTE: 0.1 K/UL (ref 0–0.2)
BASOPHILS RELATIVE PERCENT: 0.6 %
EOSINOPHILS ABSOLUTE: 0.4 K/UL (ref 0–0.7)
EOSINOPHILS RELATIVE PERCENT: 2.8 %
HCT VFR BLD CALC: 39.3 % (ref 42–52)
HEMOGLOBIN: 13.6 G/DL (ref 14–18)
LYMPHOCYTES ABSOLUTE: 2.7 K/UL (ref 1–4.8)
LYMPHOCYTES RELATIVE PERCENT: 20 %
MCH RBC QN AUTO: 32.3 PG (ref 27–31.3)
MCHC RBC AUTO-ENTMCNC: 34.6 % (ref 33–37)
MCV RBC AUTO: 93.4 FL (ref 80–100)
MONOCYTES ABSOLUTE: 1.3 K/UL (ref 0.2–0.8)
MONOCYTES RELATIVE PERCENT: 9.7 %
NEUTROPHILS ABSOLUTE: 9.2 K/UL (ref 1.4–6.5)
NEUTROPHILS RELATIVE PERCENT: 66.9 %
PDW BLD-RTO: 12.5 % (ref 11.5–14.5)
PLATELET # BLD: 399 K/UL (ref 130–400)
POTASSIUM SERPL-SCNC: 3.1 MEQ/L (ref 3.4–4.9)
RBC # BLD: 4.2 M/UL (ref 4.7–6.1)
WBC # BLD: 13.7 K/UL (ref 4.8–10.8)

## 2020-10-20 ENCOUNTER — HOSPITAL ENCOUNTER (OUTPATIENT)
Dept: PHYSICAL THERAPY | Age: 40
Setting detail: THERAPIES SERIES
Discharge: HOME | End: 2020-10-20
Payer: COMMERCIAL

## 2020-10-20 PROCEDURE — 97161 PT EVAL LOW COMPLEX 20 MIN: CPT

## 2020-10-20 PROCEDURE — 97110 THERAPEUTIC EXERCISES: CPT

## 2020-10-20 PROCEDURE — G0283 ELEC STIM OTHER THAN WOUND: HCPCS

## 2020-10-20 ASSESSMENT — PAIN DESCRIPTION - FREQUENCY: FREQUENCY: CONTINUOUS

## 2020-10-20 ASSESSMENT — PAIN DESCRIPTION - ORIENTATION: ORIENTATION: RIGHT

## 2020-10-20 ASSESSMENT — PAIN DESCRIPTION - DESCRIPTORS: DESCRIPTORS: ACHING;SORE;NUMBNESS;TINGLING

## 2020-10-20 ASSESSMENT — PAIN SCALES - GENERAL: PAINLEVEL_OUTOF10: 8

## 2020-10-20 ASSESSMENT — PAIN DESCRIPTION - LOCATION: LOCATION: NECK;SHOULDER

## 2020-10-20 NOTE — PROGRESS NOTES
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 Past Surgical History:   has a past surgical history that includes orchiopexy (Left); Cholecystectomy (7/28/15); ERCP (2015); Testicle surgery (at 6 years); and Upper gastrointestinal endoscopy (11/11/15). Vital Signs  Patient Currently in Pain: Yes   Pain Screening  Patient Currently in Pain: Yes  Pain Assessment  Pain Assessment: 0-10  Pain Level: 8(Best: 5-6/10; Worst: 10/10)  Pain Location: Neck; Shoulder  Pain Orientation: Right  Pain Descriptors: Aching;Sore;Numbness;Tingling  Pain Frequency: Continuous         Lives With: Significant other  Type of Home: House  Home Layout: One level  ADL Assistance: Independent  Homemaking Assistance: Independent  Homemaking Responsibilities: Yes  Ambulation Assistance: Independent  Transfer Assistance: Independent  Active : Yes  Occupation: Part time employment  Type of occupation: Private contractor  Leisure & Hobbies: Ride BMX bike        Subjective:  Subjective: Pt reports onset of neck and R shoulder pain 3 weeks ago when attempting to move his stove. Pt said he felt immediate pain originate between his spine and R shoulder blade with pain gradually radiating down the back of his R arm to his elbow. Pt had XR of R shoulder completed with no bony pathology identified.  At this time, pt is have pain throughotu his whole R upper quarter and occasional N/T to his first 3 digits.  For pain mgmt pt is using medication (vicoden), epsom salt baths,       Objective:   Strength RUE  Strength RUE: Exception  Comment: : 30#  R Shoulder Flexion: 3-/5  R Shoulder Extension: 3-/5  R Shoulder ABduction: 3-/5  R Shoulder Internal Rotation: 3-/5  R Shoulder External Rotation: 3-/5  R Elbow Flexion: 4/5  R Elbow Extension: 4-/5  R Forearm Pron: 4-/5  R Forearm Sup: 4-/5  R Wrist Flexion: 4/5  R Wrist Extension: 4/5  Strength LUE  Strength LUE: WNL  Comment: : 122#; Grossly 4+/5 or greater throughout LUE        AROM RUE (degrees)  RUE AROM : Exceptions  R Shoulder Flexion 0-180: 65  R Shoulder Extension 0-45: 35  R Shoulder ABduction 0-180: 90  R Shoulder Int Rotation  0-70: 45  R Shoulder Ext Rotation 0-90: 55  R Elbow Flexion 0-145: 145  R Elbow Extension 145-0: 0  R Forearm Pron 0-90: 90  R Forearm Supination  0-90: 90  AROM LUE (degrees)  LUE AROM : WNL  LUE General AROM: full ROM in all planes    Spine  Cervical: Ext to neutral, increase pain beyond, Rot R: 25 deg with radiating symptoms, Rot L WNL, SB R 45, SB L 20 with pain    Observation/Palpation  Posture: Fair  Palpation: tenderness throughout cervical and thoracic spine with PA pressure; tenderness throughout cervical, periscapular, shoulder, and pec musculature  Observation: fwd head, slouched sitting posture    Additional Measures  Special Tests: Mild symptom relief with cervical distraction supine       Exercises:   Exercises  Exercise 1: Postural education: supine lying with cervical roll under neck  Exercise 2: Supine head turns x 10 trev  Exercise 3: Supine chin tucks  Exercise 4: Scap retractions  Exercise 5: *rows, lat pulls  Exercise 6: *cervical isometrics  Exercise 7: *t-band shoulder strengthening  Exercise 8: *seated/standing cervical AROM  Exercise 9: *cervical stretches as needed  Modalities:  Modalities  Moist heat: MHP x 10 min to R UT/neck concurrent with E-stim  Ultrasound: *PRN for pain control/tissue healing  E-stim (parameters): PreMod to R cervico-thoracic junction x 10 min concurrent with MHP  Manual:  Manual therapy  Joint mobilization: *cervical and thoracic mobs to improve pain free ROM in all planes  PROM: *cervical  Manual traction: *cervical  Soft Tissue Mobalization: *cervical/thoracic, periscapular  *Indicates exercise,modality, or manual techniques to be initiated when appropriate    Assessment: Body structures, Functions, Activity limitations: Increased pain, Decreased posture, Decreased ROM, Decreased strength  Assessment: Pt is a 43 yo male referred for PT eval of R shoulder pain. Upon evaluation, pt displays symptoms potentially arising from his cervical and thoracic spine. Pt reported mild symptoms relief with symptom centralization during manual cervical distraction. Pt displays impaired neck ROM, decreased RUE ROM and strength, and reports pain, and N/T into is RUE and periscapular musculature. Pt was educated on posture and light ROM exercises to initiate working towards full pain free ROM. Pt also trialed E-stim and MHP to his neck reporting decreased symptoms afterwards. Pt will continue to benefit from PT services to continuing addressing his current deficits in pain, ROM, and strength to restore PLOF and improved QOL. Prognosis: Good  Discharge Recommendations: Continue to assess pending progress        Decision Making: Low Complexity  History: asthma  Exam: UEFI: 29/80; self reports 25% PLOF  Clinical Presentation: evolving        Plan  Frequency/Duration:  Plan  Times per week: 2  Plan weeks: 6  Current Treatment Recommendations: ROM, Strengthening, Manual Therapy - Soft Tissue Mobilization, Manual Therapy - Joint Manipulation, Neuromuscular Re-education, Modalities, Patient/Caregiver Education & Training, Home Exercise Program       Patient Education  New Education Provided: PT Education: PT Role;Plan of Care;Goals; Home Exercise Program    POST-PAIN     Pain Rating (0-10 pain scale):  5-6 /10  Location and pain description same as pre-treatment unless indicated. Action: [] NA  [] Call Physician  [x] Perform HEP  [x] Meds as prescribed    Evaluation and patient rights have been reviewed and patient agrees with plan of care. Yes  [x]  No  []   Explain:       [place] Fall Risk Assessment  Risk Factor Scale  Score   History of Falls [] Yes  [x] No 25  0 0   Secondary Diagnosis [] Yes  [x] No 15  0 0   Ambulatory Aid [] Furniture  [] Crutches/cane/walker  [x] None/bedrest/wheelchair/nurse 30  15  0 0   IV/Heparin Lock [] Yes  [x] No 20  0 0   Gait/Transferring [] Impaired  [] Weak  [x] Normal/bedrest/immobile 20  10  0 0   Mental Status [] Forgets limitations  [x] Oriented to own ability 15  0 0      Total: 0     Based on the Assessment score: check the appropriate box. [x]  No intervention needed   Low =   Score of 0-24  []  Use standard prevention interventions Moderate =  Score of 24-44   [] Discuss fall prevention strategies   [] Indicate moderate falls risk on eval  []  Use high risk prevention interventions High = Score of 45 and higher   [] Discuss fall prevention strategies   [] Provide supervision during treatment time    Goals  Long term goals  Time Frame for Long term goals : 4-6 weeks  Long term goal 1: Pt will be independent with HEP for ROM, strength, and symptom mgmt techniques  Long term goal 2: Pt will report at least 75% decrease in neck and shoulder pain without radicular symptoms into his RUE. Long term goal 3: Pt will display at least 4+/5 strength throughout RUE without symptoms reproduction during MMT  Long term goal 4: Pt will display full neck and RUE AROM without pain or radicular symptoms. Long term goal 5: Pt will report at least 90% PLOF via self report or increase UEFI score to 60/80.          PT Individual Minutes  Time In: 3186  Time Out: 1525  Minutes: 65  Timed Code Treatment Minutes: 10 Minutes  Procedure Minutes: 10 min ES/MHP combo     Timed Activity Minutes Units   Ther Ex 10 1       Electronically signed by Uriel Waterman PT on 10/20/20 at 3:42 PM EDT

## 2020-10-20 NOTE — PROGRESS NOTES
Klarissa beckham [address]     Ph: [phone]  Fax: [phone]    [] Certification  [] Recertification [x]  Plan of Care  [] Progress Note [] Discharge      To: Catarino Perdomo PA-C      From:  Halie Phelps, PT  Patient: Karen Lopes     : 1980  Diagnosis: Strain of right triceps, initial encounter; Acute pain of right shoulder     Date: 10/20/2020  Treatment Diagnosis: neck pain, R shoulder and arm pain, decreased posture, decreased RUE strength     Progress Report Period from:  10/20/2020  to 10/20/2020    Total # of Visits to Date: 1   No Show: 0    Canceled Appointment: 0     OBJECTIVE:   Long Term Goals - Time Frame for Long term goals : 4-6 weeks  Goals Current/ Discharge status Met   Long term goal 1: Pt will be independent with HEP for ROM, strength, and symptom mgmt techniques HEP initiated; to progress to pt tolerance [] yes  [x] no   Long term goal 2: Pt will report at least 75% decrease in neck and shoulder pain without radicular symptoms into his RUE. Pain Location: Neck, Shoulder    Pain Level: 8(Best: 5-6/10;  Worst: 10/10)    Pain Descriptors: Aching, Sore, Numbness, Tingling    Currently using pain pills and modalities for pain relief   [] yes  [x] no   Long term goal 3: Pt will display at least 4+/5 strength throughout RUE without symptoms reproduction during MMT Strength RUE  Strength RUE: Exception  Comment: : 30#  R Shoulder Flexion: 3-/5  R Shoulder Extension: 3-/5  R Shoulder ABduction: 3-/5  R Shoulder Internal Rotation: 3-/5  R Shoulder External Rotation: 3-/5  R Elbow Flexion: 4/5  R Elbow Extension: 4-/5  R Forearm Pron: 4-/5  R Forearm Sup: 4-/5  R Wrist Flexion: 4/5  R Wrist Extension: 4/5     Strength LUE  Strength LUE: WNL  Comment: : 122#; Grossly 4+/5 or greater throughout LUE      [] yes  [x] no   Long term goal 4: Pt will display full neck and RUE AROM without pain or radicular symptoms. AROM LUE (degrees)  LUE AROM : WNL  LUE General AROM: full ROM in all planes  Spine  Cervical: Ext to neutral, increase pain beyond, Rot R: 25 deg with radiating symptoms, Rot L WNL, SB R 45, SB L 20 with pain  AROM RUE (degrees)  RUE AROM : Exceptions  R Shoulder Flexion 0-180: 65  R Shoulder Extension 0-45: 35  R Shoulder ABduction 0-180: 90  R Shoulder Int Rotation  0-70: 45  R Shoulder Ext Rotation 0-90: 55  R Elbow Flexion 0-145: 145  R Elbow Extension 145-0: 0  R Forearm Pron 0-90: 90  R Forearm Supination  0-90: 90     [] yes  [x] no   Long term goal 5: Pt will report at least 90% PLOF via self report or increase UEFI score to 60/80. Exam: UEFI: 29/80; self reports 25% PLOF   [] yes  [x] no        Body structures, Functions, Activity limitations: Increased pain, Decreased posture, Decreased ROM, Decreased strength  Assessment: Pt is a 43 yo male referred for PT eval of R shoulder pain. Upon evaluation, pt displays symptoms potentially arising from his cervical and thoracic spine. Pt reported mild symptoms relief with symptom centralization during manual cervical distraction. Pt displays impaired neck ROM, decreased RUE ROM and strength, and reports pain, and N/T into is RUE and periscapular musculature. Pt was educated on posture and light ROM exercises to initiate working towards full pain free ROM. Pt also trialed E-stim and MHP to his neck reporting decreased symptoms afterwards. Pt will continue to benefit from PT services to continuing addressing his current deficits in pain, ROM, and strength to restore PLOF and improved QOL. Prognosis: Good  Discharge Recommendations: Continue to assess pending progress      PT Education: PT Role;Plan of Care;Goals; Home Exercise Program    PLAN: [x] Evaluate and Treat  Frequency/Duration:  Plan  Times per week: 2  Plan weeks: 6  Current Treatment Recommendations: ROM, Strengthening, Manual Therapy - Soft Tissue Mobilization, Manual Therapy - Joint Manipulation, Neuromuscular Re-education, Modalities, Patient/Caregiver Education & Training, Home Exercise Program     Precautions:   N/A                         Patient Status:[x] Continue/ Initiate plan of Care    [] Discharge PT. Recommend pt continue with HEP. [] Additional visits requested, Please re-certify for additional visits:          Signature: Electronically signed by Carlos Carrion PT on 10/20/20 at 3:45 PM EDT      If you have any questions or concerns, please don't hesitate to call. Thank you for your referral.    I have reviewed this plan of care and certify a need for medically necessary rehabilitation services.     Physician Signature:__________________________________________________________  Date:  Please sign and return

## 2020-10-24 ENCOUNTER — PATIENT MESSAGE (OUTPATIENT)
Dept: FAMILY MEDICINE CLINIC | Age: 40
End: 2020-10-24

## 2020-10-26 NOTE — TELEPHONE ENCOUNTER
From: Liz Huynh  To:  Cristofer Garza PA-C  Sent: 10/24/2020 10:13 AM EDT  Subject: Non-Urgent Medical Question    Neck feeling better but shoulder still hurting alot

## 2020-11-03 ENCOUNTER — OFFICE VISIT (OUTPATIENT)
Dept: FAMILY MEDICINE CLINIC | Age: 40
End: 2020-11-03
Payer: COMMERCIAL

## 2020-11-03 VITALS
SYSTOLIC BLOOD PRESSURE: 100 MMHG | DIASTOLIC BLOOD PRESSURE: 78 MMHG | HEIGHT: 68 IN | BODY MASS INDEX: 25.58 KG/M2 | WEIGHT: 168.8 LBS | RESPIRATION RATE: 16 BRPM | OXYGEN SATURATION: 98 % | HEART RATE: 74 BPM | TEMPERATURE: 97.4 F

## 2020-11-03 DIAGNOSIS — E87.6 HYPOKALEMIA: ICD-10-CM

## 2020-11-03 DIAGNOSIS — R79.89 ABNORMAL CBC MEASUREMENT: ICD-10-CM

## 2020-11-03 LAB
BASOPHILS ABSOLUTE: 0.1 K/UL (ref 0–0.2)
BASOPHILS RELATIVE PERCENT: 0.6 %
EOSINOPHILS ABSOLUTE: 0.2 K/UL (ref 0–0.7)
EOSINOPHILS RELATIVE PERCENT: 2.7 %
HCT VFR BLD CALC: 40.4 % (ref 42–52)
HEMOGLOBIN: 14.1 G/DL (ref 14–18)
LYMPHOCYTES ABSOLUTE: 2.7 K/UL (ref 1–4.8)
LYMPHOCYTES RELATIVE PERCENT: 30.5 %
MCH RBC QN AUTO: 32.9 PG (ref 27–31.3)
MCHC RBC AUTO-ENTMCNC: 34.8 % (ref 33–37)
MCV RBC AUTO: 94.7 FL (ref 80–100)
MONOCYTES ABSOLUTE: 0.8 K/UL (ref 0.2–0.8)
MONOCYTES RELATIVE PERCENT: 8.7 %
NEUTROPHILS ABSOLUTE: 5.1 K/UL (ref 1.4–6.5)
NEUTROPHILS RELATIVE PERCENT: 57.5 %
PDW BLD-RTO: 12.3 % (ref 11.5–14.5)
PLATELET # BLD: 414 K/UL (ref 130–400)
POTASSIUM SERPL-SCNC: 3.9 MEQ/L (ref 3.4–4.9)
RBC # BLD: 4.27 M/UL (ref 4.7–6.1)
WBC # BLD: 8.8 K/UL (ref 4.8–10.8)

## 2020-11-03 PROCEDURE — 99214 OFFICE O/P EST MOD 30 MIN: CPT | Performed by: PHYSICIAN ASSISTANT

## 2020-11-03 PROCEDURE — G8484 FLU IMMUNIZE NO ADMIN: HCPCS | Performed by: PHYSICIAN ASSISTANT

## 2020-11-03 PROCEDURE — G8427 DOCREV CUR MEDS BY ELIG CLIN: HCPCS | Performed by: PHYSICIAN ASSISTANT

## 2020-11-03 PROCEDURE — 4004F PT TOBACCO SCREEN RCVD TLK: CPT | Performed by: PHYSICIAN ASSISTANT

## 2020-11-03 PROCEDURE — G8419 CALC BMI OUT NRM PARAM NOF/U: HCPCS | Performed by: PHYSICIAN ASSISTANT

## 2020-11-03 RX ORDER — ORPHENADRINE CITRATE 100 MG/1
100 TABLET, EXTENDED RELEASE ORAL 2 TIMES DAILY
Qty: 20 TABLET | Refills: 0 | Status: SHIPPED | OUTPATIENT
Start: 2020-11-03 | End: 2020-11-13

## 2020-11-03 RX ORDER — HYDROCODONE BITARTRATE AND ACETAMINOPHEN 5; 325 MG/1; MG/1
1 TABLET ORAL EVERY 8 HOURS PRN
Qty: 21 TABLET | Refills: 0 | Status: SHIPPED | OUTPATIENT
Start: 2020-11-03 | End: 2020-11-10

## 2020-11-03 RX ORDER — KETOROLAC TROMETHAMINE 30 MG/ML
60 INJECTION, SOLUTION INTRAMUSCULAR; INTRAVENOUS ONCE
Status: COMPLETED | OUTPATIENT
Start: 2020-11-03 | End: 2020-11-03

## 2020-11-03 RX ADMIN — KETOROLAC TROMETHAMINE 60 MG: 30 INJECTION, SOLUTION INTRAMUSCULAR; INTRAVENOUS at 14:15

## 2020-11-03 NOTE — PROGRESS NOTES
Neutrophils Absolute 9.2 (H) 1.4 - 6.5 K/uL    Lymphocytes Absolute 2.7 1.0 - 4.8 K/uL    Monocytes Absolute 1.3 (H) 0.2 - 0.8 K/uL    Eosinophils Absolute 0.4 0.0 - 0.7 K/uL    Basophils Absolute 0.1 0.0 - 0.2 K/uL     Review of Systems   Constitutional: Positive for activity change. Negative for appetite change, chills, diaphoresis, fatigue, fever and unexpected weight change. Eyes: Negative for visual disturbance. Respiratory: Negative for chest tightness, shortness of breath and wheezing. Cardiovascular: Negative for chest pain, palpitations and leg swelling. Musculoskeletal: Positive for arthralgias and myalgias. Negative for gait problem and joint swelling. Skin: Negative for color change, rash and wound. Neurological: Positive for weakness (RUE) and numbness (bilateral hands, R>>L). Negative for dizziness and light-headedness. Psychiatric/Behavioral: Negative for dysphoric mood and sleep disturbance. The patient is not nervous/anxious.       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    Moderate persistent asthma without complication 90/53/0319     Past Surgical History:   Procedure Laterality Date    CHOLECYSTECTOMY  7/28/15    lap gabrielle with grams    ERCP  7/30/2015    ERCP with sphincterotomy and common bile duct sweep    ORCHIOPEXY Left     TESTICLE SURGERY  at 6 years    UPPER GASTROINTESTINAL ENDOSCOPY  11/11/15    w/bx      Social History     Socioeconomic History    Marital status: Single     Spouse name: Not on file    Number of children: Not on file    Years of education: Not on file    Highest education level: Not on file   Occupational History    Not on file   Social Needs    Financial resource strain: Not on file    Food insecurity     Worry: Not on file     Inability: Not on file    Transportation needs     Medical: Not on file Non-medical: Not on file   Tobacco Use    Smoking status: Current Every Day Smoker     Packs/day: 0.50    Smokeless tobacco: Never Used   Substance and Sexual Activity    Alcohol use: No     Alcohol/week: 0.0 standard drinks    Drug use: Yes     Types: Marijuana    Sexual activity: Yes   Lifestyle    Physical activity     Days per week: Not on file     Minutes per session: Not on file    Stress: Not on file   Relationships    Social connections     Talks on phone: Not on file     Gets together: Not on file     Attends [employer] service: Not on file     Active member of club or organization: Not on file     Attends meetings of clubs or organizations: Not on file     Relationship status: Not on file    Intimate partner violence     Fear of current or ex partner: Not on file     Emotionally abused: Not on file     Physically abused: Not on file     Forced sexual activity: Not on file   Other Topics Concern    Not on file   Social History Narrative    Not on file     Family History   Problem Relation Age of Onset    Asthma Mother     COPD Mother     Lung Cancer Father     Cancer Paternal Uncle         Lung     No Known Allergies  Current Outpatient Medications   Medication Sig Dispense Refill    diclofenac sodium (VOLTAREN) 1 % GEL Apply 2 g topically 4 times daily as needed for Pain (R shoulder pain) 1 Tube 3    HYDROcodone-acetaminophen (NORCO) 5-325 MG per tablet Take 1 tablet by mouth every 8 hours as needed for Pain for up to 7 days. Intended supply: 3 days.  Take lowest dose possible to manage pain 21 tablet 0    orphenadrine (NORFLEX) 100 MG extended release tablet Take 1 tablet by mouth 2 times daily for 10 days 20 tablet 0    ipratropium-albuterol (DUONEB) 0.5-2.5 (3) MG/3ML SOLN nebulizer solution inhale contents of 1 vial ( 3 milliliters ) in nebulizer by mouth and INTO THE LUNGS every 4 hours if needed for wheezing 360 mL 1    potassium chloride (KLOR-CON M) 20 MEQ extended release tablet take 1 tablet by mouth twice a day 60 tablet 2    diclofenac sodium (VOLTAREN) 1 % GEL Apply 2 g topically 4 times daily 150 g 2    cyclobenzaprine (FLEXERIL) 10 MG tablet Take 1 tablet by mouth 3 times daily as needed for Muscle spasms Start with 1/2 tab. Do not drive or operate machinery while taking muscle relaxers. 15 tablet 0    omeprazole (PRILOSEC) 40 MG delayed release capsule take 1 capsule by mouth once daily 30 capsule 2    albuterol-ipratropium (COMBIVENT RESPIMAT)  MCG/ACT AERS inhaler Inhale 1 puff into the lungs every 4 hours as needed for Wheezing or Shortness of Breath 2 Inhaler 5    Nebulizers (AIRIAL COMPACT MINI NEBULIZER) MISC Use as directed  Dx: ICD-10-CM: J45.40     ICD-9-CM: 493.90 1 each 0     No current facility-administered medications for this visit. PMH, Surgical Hx, Family Hx, and Social Hx reviewed and updated. Health Maintenance reviewed. Objective  Vitals:    11/03/20 1336   BP: 100/78   Site: Left Upper Arm   Position: Sitting   Cuff Size: Medium Adult   Pulse: 74   Resp: 16   Temp: 97.4 °F (36.3 °C)   TempSrc: Temporal   SpO2: 98%   Weight: 168 lb 12.8 oz (76.6 kg)   Height: 5' 8\" (1.727 m)     BP Readings from Last 3 Encounters:   11/03/20 100/78   10/13/20 122/78   10/02/20 122/64     Wt Readings from Last 3 Encounters:   11/03/20 168 lb 12.8 oz (76.6 kg)   10/13/20 167 lb 9.6 oz (76 kg)   10/02/20 163 lb 9.6 oz (74.2 kg)     Physical Exam  Vitals signs reviewed. Constitutional:       Appearance: Normal appearance. He is normal weight. HENT:      Head: Normocephalic and atraumatic. Right Ear: External ear normal.      Left Ear: External ear normal.      Nose: Nose normal.      Mouth/Throat:      Mouth: Mucous membranes are moist.   Eyes:      Conjunctiva/sclera: Conjunctivae normal.   Cardiovascular:      Rate and Rhythm: Normal rate and regular rhythm. Pulmonary:      Effort: Pulmonary effort is normal.      Breath sounds: Normal breath sounds. Musculoskeletal:      Right shoulder: He exhibits tenderness and pain. He exhibits normal range of motion, no bony tenderness, no swelling, no effusion, no crepitus, no spasm, normal pulse and normal strength. Left shoulder: Normal.        Arms:       Comments: Decrease  strength.  +tinnels of ulnar nerve with percussion. Skin:     General: Skin is warm. Neurological:      General: No focal deficit present. Mental Status: He is alert and oriented to person, place, and time. Assessment & Plan   Néstor Sultana was seen today for shoulder pain. Diagnoses and all orders for this visit:    Strain of right triceps, initial encounter  -     diclofenac sodium (VOLTAREN) 1 % GEL; Apply 2 g topically 4 times daily as needed for Pain (R shoulder pain)  -     ketorolac (TORADOL) injection 60 mg  -     HYDROcodone-acetaminophen (NORCO) 5-325 MG per tablet; Take 1 tablet by mouth every 8 hours as needed for Pain for up to 7 days. Intended supply: 3 days. Take lowest dose possible to manage pain    Acute pain of right shoulder  -     diclofenac sodium (VOLTAREN) 1 % GEL; Apply 2 g topically 4 times daily as needed for Pain (R shoulder pain)  -     ketorolac (TORADOL) injection 60 mg  -     HYDROcodone-acetaminophen (NORCO) 5-325 MG per tablet; Take 1 tablet by mouth every 8 hours as needed for Pain for up to 7 days. Intended supply: 3 days. Take lowest dose possible to manage pain    Hypokalemia  -     Potassium; Future    Abnormal CBC measurement  -     CBC Auto Differential; Future    Numbness and tingling in both hands    Bilateral carpal tunnel syndrome    Other orders  -     orphenadrine (NORFLEX) 100 MG extended release tablet; Take 1 tablet by mouth 2 times daily for 10 days    Await MRI results. Discussed referral to ortho today, patient would like to wait as he is having transportation issues. Repeat labs today. Trial norflex PRN for muscle spasm.     OK to refill norco as medication is helping with pain. Aware of PRN use and the risk of opioid pain medication. [facility] report ran and is consistent with current and past history concerning scheduled medications. Follow up with me after MRI. Orders Placed This Encounter   Procedures    Potassium     Standing Status:   Future     Number of Occurrences:   1     Standing Expiration Date:   11/3/2021    CBC Auto Differential     Standing Status:   Future     Number of Occurrences:   1     Standing Expiration Date:   11/3/2021     Orders Placed This Encounter   Medications    diclofenac sodium (VOLTAREN) 1 % GEL     Sig: Apply 2 g topically 4 times daily as needed for Pain (R shoulder pain)     Dispense:  1 Tube     Refill:  3    ketorolac (TORADOL) injection 60 mg    HYDROcodone-acetaminophen (NORCO) 5-325 MG per tablet     Sig: Take 1 tablet by mouth every 8 hours as needed for Pain for up to 7 days. Intended supply: 3 days. Take lowest dose possible to manage pain     Dispense:  21 tablet     Refill:  0     Reduce doses taken as pain becomes manageable    orphenadrine (NORFLEX) 100 MG extended release tablet     Sig: Take 1 tablet by mouth 2 times daily for 10 days     Dispense:  20 tablet     Refill:  0     Medications Discontinued During This Encounter   Medication Reason    naproxen sodium (ANAPROX) 550 MG tablet LIST CLEANUP    HYDROcodone-acetaminophen (NORCO) 5-325 MG per tablet REORDER     No follow-ups on file. Reviewed with the patient: current clinical status, medications, activities and diet. Side effects, adverse effects of the medication prescribed today, as well as treatment plan/ rationale and result expectations have been discussed with the patient who expresses understanding and desires to proceed. Close follow up to evaluate treatment results and for coordination of care. I have reviewed the patient's medical history in detail and updated the computerized patient record.     Serina Elizabeth PA-C

## 2020-11-07 PROBLEM — R79.89 ABNORMAL CBC MEASUREMENT: Status: ACTIVE | Noted: 2020-11-07

## 2020-11-07 PROBLEM — G56.03 BILATERAL CARPAL TUNNEL SYNDROME: Status: ACTIVE | Noted: 2020-11-07

## 2020-11-07 PROBLEM — S64.491A INJURY OF DIGITAL NERVE OF LEFT INDEX FINGER: Status: RESOLVED | Noted: 2020-07-14 | Resolved: 2020-11-07

## 2020-11-07 ASSESSMENT — ENCOUNTER SYMPTOMS
SHORTNESS OF BREATH: 0
WHEEZING: 0
CHEST TIGHTNESS: 0
COLOR CHANGE: 0

## 2020-11-09 ENCOUNTER — HOSPITAL ENCOUNTER (OUTPATIENT)
Dept: MRI IMAGING | Age: 40
Discharge: HOME | End: 2020-11-11
Payer: COMMERCIAL

## 2020-11-09 PROCEDURE — 73221 MRI JOINT UPR EXTREM W/O DYE: CPT

## 2020-11-11 ENCOUNTER — PATIENT MESSAGE (OUTPATIENT)
Dept: FAMILY MEDICINE CLINIC | Age: 40
End: 2020-11-11

## 2020-11-12 NOTE — TELEPHONE ENCOUNTER
From: Karen Lopes  To:  Ally Elizabeth PA-C  Sent: 11/11/2020 10:51 AM EST  Subject: Prescription Question    The gel solution percentage did not go up on the new one to 3 percent can you please put in at the 3 percent gel solution

## 2020-11-14 RX ORDER — LIDOCAINE 50 MG/G
OINTMENT TOPICAL
Qty: 240 G | Refills: 1 | Status: SHIPPED | OUTPATIENT
Start: 2020-11-14 | End: 2022-04-18

## 2020-12-14 NOTE — PROGRESS NOTES
[place] be[address]ham, [address]                                  Ph: [phone]  Fax: [phone]     []? Certification  []? Recertification      []? Plan of Care  []? Progress Note [x]? Discharge                            To:  Nii Meyer PA-C              From:  Eli Dubose, PT  Patient: Wanda Talley     : 1980  Diagnosis: Strain of right triceps, initial encounter; Acute pain of right shoulder     Date: 2020  Treatment Diagnosis: neck pain, R shoulder and arm pain, decreased posture, decreased RUE strength  Progress Report Period from:  10/20/2020  to 10/20/2020     Total # of Visits to Date: 1   No Show: 0    Canceled Appointment: 0      OBJECTIVE:   Long Term Goals - Time Frame for Long term goals : 4-6 weeks  Goals Current/ Discharge status Met   Long term goal 1: Pt will be independent with HEP for ROM, strength, and symptom mgmt techniques All goals unable to be formally reassessed d/t unexpected d/c. Pt did not return to PT since eval.  []? yes  []? no   Long term goal 2: Pt will report at least 75% decrease in neck and shoulder pain without radicular symptoms into his RUE. []? yes  []? no   Long term goal 3: Pt will display at least 4+/5 strength throughout RUE without symptoms reproduction during MMT     []? yes  []? no   Long term goal 4: Pt will display full neck and RUE AROM without pain or radicular symptoms. []? yes  []? no   Long term goal 5: Pt will report at least 90% PLOF via self report or increase UEFI score to 60/80. []? yes  []? no          Body structures, Functions, Activity limitations: Increased pain, Decreased posture, Decreased ROM, Decreased strength  Assessment: Pt was evaluated for neck pain and R shoulder pain 10/20/20. Pt requested to not schedule any f/u visits until he was able to work out rides for his therapy sessions.  Multiple attempts made to contact pt via phone to

## 2021-09-26 DIAGNOSIS — E87.6 HYPOKALEMIA: ICD-10-CM

## 2021-09-26 DIAGNOSIS — K21.00 GASTROESOPHAGEAL REFLUX DISEASE WITH ESOPHAGITIS: ICD-10-CM

## 2021-09-28 RX ORDER — OMEPRAZOLE 40 MG/1
CAPSULE, DELAYED RELEASE ORAL
Qty: 30 CAPSULE | Refills: 2 | Status: SHIPPED | OUTPATIENT
Start: 2021-09-28 | End: 2022-04-07 | Stop reason: SDUPTHER

## 2021-09-28 RX ORDER — POTASSIUM CHLORIDE 20 MEQ/1
TABLET, EXTENDED RELEASE ORAL
Qty: 60 TABLET | Refills: 2 | Status: SHIPPED | OUTPATIENT
Start: 2021-09-28 | End: 2022-09-12 | Stop reason: SDUPTHER

## 2021-11-01 RX ORDER — IPRATROPIUM BROMIDE AND ALBUTEROL SULFATE 2.5; .5 MG/3ML; MG/3ML
SOLUTION RESPIRATORY (INHALATION)
Qty: 360 ML | Refills: 1 | Status: SHIPPED | OUTPATIENT
Start: 2021-11-01 | End: 2022-04-07 | Stop reason: SDUPTHER

## 2021-11-01 NOTE — TELEPHONE ENCOUNTER
Pharmacy requesting medication refill. Please approve or deny this request.    Rx requested:  Requested Prescriptions     Pending Prescriptions Disp Refills    ipratropium-albuterol (DUONEB) 0.5-2.5 (3) MG/3ML SOLN nebulizer solution [Pharmacy Med Name: IPRAT-ALBUT 0.5-3(2.5) MG/3 ML] 360 mL 1     Sig: inhale contents of 1 vial ( 3 milliliters ) in nebulizer by mouth and INTO THE LUNGS every 4 hours if needed for wheezing         Last Office Visit:   11/3/2020      Next Visit Date:  No future appointments.

## 2022-04-07 DIAGNOSIS — K21.00 GASTROESOPHAGEAL REFLUX DISEASE WITH ESOPHAGITIS: ICD-10-CM

## 2022-04-07 NOTE — TELEPHONE ENCOUNTER
Left message for patient that he is due for an appt.        Patient called back scheduled appt for 4/18/2022

## 2022-04-08 RX ORDER — OMEPRAZOLE 40 MG/1
CAPSULE, DELAYED RELEASE ORAL
Qty: 30 CAPSULE | Refills: 2 | Status: SHIPPED | OUTPATIENT
Start: 2022-04-08 | End: 2022-06-27

## 2022-04-08 RX ORDER — IPRATROPIUM BROMIDE AND ALBUTEROL SULFATE 2.5; .5 MG/3ML; MG/3ML
SOLUTION RESPIRATORY (INHALATION)
Qty: 360 ML | Refills: 1 | Status: SHIPPED | OUTPATIENT
Start: 2022-04-08 | End: 2022-06-03

## 2022-04-18 ENCOUNTER — OFFICE VISIT (OUTPATIENT)
Dept: FAMILY MEDICINE CLINIC | Age: 42
End: 2022-04-18
Payer: COMMERCIAL

## 2022-04-18 VITALS
HEART RATE: 66 BPM | RESPIRATION RATE: 16 BRPM | OXYGEN SATURATION: 95 % | BODY MASS INDEX: 26.07 KG/M2 | DIASTOLIC BLOOD PRESSURE: 70 MMHG | SYSTOLIC BLOOD PRESSURE: 120 MMHG | HEIGHT: 68 IN | WEIGHT: 172 LBS

## 2022-04-18 DIAGNOSIS — R20.2 NUMBNESS AND TINGLING OF BOTH LEGS: ICD-10-CM

## 2022-04-18 DIAGNOSIS — R20.0 NUMBNESS AND TINGLING IN BOTH HANDS: ICD-10-CM

## 2022-04-18 DIAGNOSIS — G56.03 BILATERAL CARPAL TUNNEL SYNDROME: ICD-10-CM

## 2022-04-18 DIAGNOSIS — R20.0 NUMBNESS AND TINGLING OF BOTH LEGS: ICD-10-CM

## 2022-04-18 DIAGNOSIS — S19.80XS BLUNT TRAUMA OF NECK, SEQUELA: Primary | ICD-10-CM

## 2022-04-18 DIAGNOSIS — J45.40 MODERATE PERSISTENT ASTHMA WITHOUT COMPLICATION: ICD-10-CM

## 2022-04-18 DIAGNOSIS — M62.838 NECK MUSCLE SPASM: ICD-10-CM

## 2022-04-18 DIAGNOSIS — E87.6 HYPOKALEMIA: ICD-10-CM

## 2022-04-18 DIAGNOSIS — R20.2 NUMBNESS AND TINGLING IN BOTH HANDS: ICD-10-CM

## 2022-04-18 LAB
ALBUMIN SERPL-MCNC: 4.6 G/DL (ref 3.5–4.6)
ALP BLD-CCNC: 96 U/L (ref 35–104)
ALT SERPL-CCNC: 34 U/L (ref 0–41)
ANION GAP SERPL CALCULATED.3IONS-SCNC: 14 MEQ/L (ref 9–15)
AST SERPL-CCNC: 25 U/L (ref 0–40)
BILIRUB SERPL-MCNC: 0.5 MG/DL (ref 0.2–0.7)
BUN BLDV-MCNC: 6 MG/DL (ref 6–20)
CALCIUM SERPL-MCNC: 9.3 MG/DL (ref 8.5–9.9)
CHLORIDE BLD-SCNC: 104 MEQ/L (ref 95–107)
CHOLESTEROL, FASTING: 147 MG/DL (ref 0–199)
CO2: 26 MEQ/L (ref 20–31)
CREAT SERPL-MCNC: 1.05 MG/DL (ref 0.7–1.2)
GFR AFRICAN AMERICAN: >60
GFR NON-AFRICAN AMERICAN: >60
GLOBULIN: 2.5 G/DL (ref 2.3–3.5)
GLUCOSE BLD-MCNC: 103 MG/DL (ref 70–99)
HCT VFR BLD CALC: 40.3 % (ref 42–52)
HDLC SERPL-MCNC: 26 MG/DL (ref 40–59)
HEMOGLOBIN: 13.9 G/DL (ref 14–18)
LDL CHOLESTEROL CALCULATED: 89 MG/DL (ref 0–129)
MCH RBC QN AUTO: 32.1 PG (ref 27–31.3)
MCHC RBC AUTO-ENTMCNC: 34.6 % (ref 33–37)
MCV RBC AUTO: 92.9 FL (ref 80–100)
PDW BLD-RTO: 12.7 % (ref 11.5–14.5)
PLATELET # BLD: 310 K/UL (ref 130–400)
POTASSIUM SERPL-SCNC: 3.1 MEQ/L (ref 3.4–4.9)
RBC # BLD: 4.34 M/UL (ref 4.7–6.1)
SODIUM BLD-SCNC: 144 MEQ/L (ref 135–144)
TOTAL PROTEIN: 7.1 G/DL (ref 6.3–8)
TRIGLYCERIDE, FASTING: 158 MG/DL (ref 0–150)
WBC # BLD: 7.9 K/UL (ref 4.8–10.8)

## 2022-04-18 PROCEDURE — 99214 OFFICE O/P EST MOD 30 MIN: CPT | Performed by: PHYSICIAN ASSISTANT

## 2022-04-18 PROCEDURE — G8427 DOCREV CUR MEDS BY ELIG CLIN: HCPCS | Performed by: PHYSICIAN ASSISTANT

## 2022-04-18 PROCEDURE — 4004F PT TOBACCO SCREEN RCVD TLK: CPT | Performed by: PHYSICIAN ASSISTANT

## 2022-04-18 PROCEDURE — G8419 CALC BMI OUT NRM PARAM NOF/U: HCPCS | Performed by: PHYSICIAN ASSISTANT

## 2022-04-18 RX ORDER — METHOCARBAMOL 500 MG/1
500 TABLET, FILM COATED ORAL NIGHTLY PRN
Qty: 30 TABLET | Refills: 0 | Status: SHIPPED | OUTPATIENT
Start: 2022-04-18 | End: 2022-05-18

## 2022-04-18 SDOH — ECONOMIC STABILITY: FOOD INSECURITY: WITHIN THE PAST 12 MONTHS, YOU WORRIED THAT YOUR FOOD WOULD RUN OUT BEFORE YOU GOT MONEY TO BUY MORE.: NEVER TRUE

## 2022-04-18 SDOH — ECONOMIC STABILITY: FOOD INSECURITY: WITHIN THE PAST 12 MONTHS, THE FOOD YOU BOUGHT JUST DIDN'T LAST AND YOU DIDN'T HAVE MONEY TO GET MORE.: NEVER TRUE

## 2022-04-18 ASSESSMENT — SOCIAL DETERMINANTS OF HEALTH (SDOH): HOW HARD IS IT FOR YOU TO PAY FOR THE VERY BASICS LIKE FOOD, HOUSING, MEDICAL CARE, AND HEATING?: NOT HARD AT ALL

## 2022-04-18 ASSESSMENT — PATIENT HEALTH QUESTIONNAIRE - PHQ9
SUM OF ALL RESPONSES TO PHQ QUESTIONS 1-9: 0
SUM OF ALL RESPONSES TO PHQ QUESTIONS 1-9: 0
SUM OF ALL RESPONSES TO PHQ9 QUESTIONS 1 & 2: 0
SUM OF ALL RESPONSES TO PHQ QUESTIONS 1-9: 0
SUM OF ALL RESPONSES TO PHQ QUESTIONS 1-9: 0
1. LITTLE INTEREST OR PLEASURE IN DOING THINGS: 0
2. FEELING DOWN, DEPRESSED OR HOPELESS: 0
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
## 2022-04-19 LAB
FOLATE: 3.6 NG/ML
VITAMIN B-12: 430 PG/ML (ref 232–1245)
VITAMIN D 25-HYDROXY: 12.2 NG/ML

## 2022-04-20 ENCOUNTER — HOSPITAL ENCOUNTER (OUTPATIENT)
Dept: GENERAL RADIOLOGY | Age: 42
Discharge: HOME | End: 2022-04-22
Payer: COMMERCIAL

## 2022-04-20 DIAGNOSIS — S19.80XS BLUNT TRAUMA OF NECK, SEQUELA: ICD-10-CM

## 2022-04-20 DIAGNOSIS — R20.2 NUMBNESS AND TINGLING IN BOTH HANDS: ICD-10-CM

## 2022-04-20 DIAGNOSIS — R20.0 NUMBNESS AND TINGLING IN BOTH HANDS: ICD-10-CM

## 2022-04-20 PROCEDURE — 72050 X-RAY EXAM NECK SPINE 4/5VWS: CPT

## 2022-04-26 DIAGNOSIS — E55.9 VITAMIN D DEFICIENCY: Primary | ICD-10-CM

## 2022-04-26 DIAGNOSIS — E53.8 FOLATE DEFICIENCY: ICD-10-CM

## 2022-04-26 RX ORDER — IRON HEME POLYPEPTIDE/FOLIC AC 12-1MG
5000 TABLET ORAL DAILY
Qty: 90 CAPSULE | Refills: 4 | Status: SHIPPED | OUTPATIENT
Start: 2022-04-26

## 2022-04-26 RX ORDER — VITAMIN B COMPLEX
CAPSULE ORAL
Qty: 90 CAPSULE | Refills: 4 | Status: SHIPPED | OUTPATIENT
Start: 2022-04-26 | End: 2022-05-24 | Stop reason: SINTOL

## 2022-04-27 ENCOUNTER — HOSPITAL ENCOUNTER (OUTPATIENT)
Dept: NEUROLOGY | Age: 42
Discharge: HOME | End: 2022-04-27
Payer: COMMERCIAL

## 2022-04-27 DIAGNOSIS — S19.80XS BLUNT TRAUMA OF NECK, SEQUELA: ICD-10-CM

## 2022-04-27 DIAGNOSIS — R20.2 NUMBNESS AND TINGLING IN BOTH HANDS: ICD-10-CM

## 2022-04-27 DIAGNOSIS — R20.0 NUMBNESS AND TINGLING IN BOTH HANDS: ICD-10-CM

## 2022-04-27 PROCEDURE — 95910 NRV CNDJ TEST 7-8 STUDIES: CPT

## 2022-04-27 PROCEDURE — 95886 MUSC TEST DONE W/N TEST COMP: CPT

## 2022-04-27 NOTE — PROCEDURES
[address]                      [address], [address] [facility]                             ELECTROMYOGRAM REPORT    PATIENT NAME: Corazon Gaytan                     :        1980  MED REC NO:   [id number]                            ROOM:  ACCOUNT NO:   [de-identified]                           ADMIT DATE: 2022  PROVIDER:     Marcus Ching MD    DATE OF EM2022    REFERRING PROVIDER:  Raoul Elizabeth PA-C.    REASON FOR STUDY:  EMG study was done to look for peripheral nerve  entrapments versus peripheral neuropathy versus cervical radiculopathy. The patient had bilateral neck stiffness off and on. Previous EMG study  was available for comparison from 2018. FINDINGS:  Motor nerve conduction velocities are normal in all the  nerves tested. F-wave latency is delayed in the right median nerve, but normal in other  nerves tested. Distal motor and sensory latencies are normal in the ulnar nerves, but  are delayed in the median nerves bilaterally, being worse on the right  side. On concentric needle electrode examination, mild denervation changes are  present in the abductor pollicis brevis muscles bilaterally. Denervation changes are also seen in the right C7 root distribution. CLINICAL INTERPRETATION:  EMG studies are showin. Moderate-to-severe right median nerve compression neuropathy at the  wrist consistent with diagnosis of moderate-to-severe right carpal  tunnel syndrome. 2.  Moderate compression of the left median nerve at the wrist  consistent with diagnosis of moderate left carpal tunnel syndrome. Due to continued symptoms, the patient shall need decompression of the  median nerves to start with on the right side. The patient has developed changes of mild-to-moderate right C7  radiculopathy, which is new since the previous study of .   For the  cervical radiculopathy, he could be tried on conservative management  with physical therapy, analgesics, and muscle relaxants. If his  symptoms continue, imaging of the cervical canal may be done to look for  compromise of the spinal canal and/or foramina. Thank you Ms. Elizabeth for allowing me to see this patient. Please feel  free to call me if I can be of any further assistance regarding this  patient's evaluation.         Dipak Naylor MD    D: 04/27/2022 16:04:26       T: 04/27/2022 16:09:16     [email or web address]  Job#: [id number]     Doc#: [id number]    CC:

## 2022-05-04 PROBLEM — M62.838 NECK MUSCLE SPASM: Status: ACTIVE | Noted: 2022-05-04

## 2022-05-04 PROBLEM — S19.80XA BLUNT TRAUMA OF NECK: Status: ACTIVE | Noted: 2022-05-04

## 2022-05-04 PROBLEM — R20.0 NUMBNESS AND TINGLING OF BOTH LEGS: Status: ACTIVE | Noted: 2022-05-04

## 2022-05-04 PROBLEM — R20.2 NUMBNESS AND TINGLING OF BOTH LEGS: Status: ACTIVE | Noted: 2022-05-04

## 2022-05-04 ASSESSMENT — ENCOUNTER SYMPTOMS
BACK PAIN: 1
WHEEZING: 0
COLOR CHANGE: 0
SHORTNESS OF BREATH: 0
CHEST TIGHTNESS: 0

## 2022-05-11 ENCOUNTER — HOSPITAL ENCOUNTER (OUTPATIENT)
Dept: NEUROLOGY | Age: 42
Discharge: HOME | End: 2022-05-11
Payer: COMMERCIAL

## 2022-05-11 PROCEDURE — 95886 MUSC TEST DONE W/N TEST COMP: CPT

## 2022-05-11 PROCEDURE — 95910 NRV CNDJ TEST 7-8 STUDIES: CPT

## 2022-05-11 NOTE — PROCEDURES
[address]                      [address] [facility], [address] [facility]                             ELECTROMYOGRAM REPORT    PATIENT NAME: Osei Martin                     :        1980  MED REC NO:   [id number]                            ROOM:  ACCOUNT NO:   [de-identified]                           ADMIT DATE: 2022  PROVIDER:     Giles Rice MD    DATE OF EM2022    REFERRING PROVIDER:  Earnest Elizabeth PA-C.    REASON FOR STUDY:  The patient was having numbness in the feet, being  somewhat worse on the right side. FINDINGS:  Motor nerve conduction velocities are normal in all the  nerves tested. F-wave latencies are mildly delayed in the right tibial nerve and normal  in other nerves tested. Distal motor latencies are normal in all the nerves tested. Distal sensory latency is borderline in the right sural nerve and normal  in other nerves tested. Amplitudes of motor and sensory responses are decreased in many of the  nerves tested. On concentric needle electrode examination, minimal denervation changes  are present in the extensor digitorum brevis muscles. CLINICAL INTERPRETATION:  EMG studies are suggestive of peripheral  neuropathy. Most probably, this is due to the patient's history of  alcohol abuse in the past.    If clinically indicated, other causes of peripheral neuropathy could  also be looked into such as exposure to toxins, autoimmune disorder,  hypothyroidism, diabetes mellitus etc.    For the paresthesias, he could be tried on medications such as  gabapentin, Trileptal, topiramate, etc.    If clinically indicated, we could repeat the study in a year. Thank you Ms. Elizabeth for allowing me to see this patient. Please feel  free to call me if I can be of any further assistance regarding this  patient's evaluation.         Shaniqua Ratliff MD    D: 2022 14:31:59       T: 2022 14:35:29     [email or web address]  Job#: [id number]     Doc#: [id number]    CC:

## 2022-05-24 ENCOUNTER — OFFICE VISIT (OUTPATIENT)
Dept: FAMILY MEDICINE CLINIC | Age: 42
End: 2022-05-24
Payer: COMMERCIAL

## 2022-05-24 VITALS
TEMPERATURE: 96.9 F | HEIGHT: 68 IN | HEART RATE: 73 BPM | BODY MASS INDEX: 26.67 KG/M2 | WEIGHT: 176 LBS | DIASTOLIC BLOOD PRESSURE: 76 MMHG | OXYGEN SATURATION: 96 % | SYSTOLIC BLOOD PRESSURE: 98 MMHG

## 2022-05-24 DIAGNOSIS — M25.551 CHRONIC ARTHRALGIAS OF KNEES AND HIPS: Primary | ICD-10-CM

## 2022-05-24 DIAGNOSIS — M25.561 CHRONIC ARTHRALGIAS OF KNEES AND HIPS: Primary | ICD-10-CM

## 2022-05-24 DIAGNOSIS — M25.552 CHRONIC ARTHRALGIAS OF KNEES AND HIPS: Primary | ICD-10-CM

## 2022-05-24 DIAGNOSIS — E53.8 FOLATE DEFICIENCY: ICD-10-CM

## 2022-05-24 DIAGNOSIS — R79.89 ABNORMAL CBC MEASUREMENT: ICD-10-CM

## 2022-05-24 DIAGNOSIS — M25.562 CHRONIC ARTHRALGIAS OF KNEES AND HIPS: Primary | ICD-10-CM

## 2022-05-24 DIAGNOSIS — G89.29 CHRONIC ARTHRALGIAS OF KNEES AND HIPS: Primary | ICD-10-CM

## 2022-05-24 PROCEDURE — 4004F PT TOBACCO SCREEN RCVD TLK: CPT | Performed by: PHYSICIAN ASSISTANT

## 2022-05-24 PROCEDURE — G8427 DOCREV CUR MEDS BY ELIG CLIN: HCPCS | Performed by: PHYSICIAN ASSISTANT

## 2022-05-24 PROCEDURE — G8419 CALC BMI OUT NRM PARAM NOF/U: HCPCS | Performed by: PHYSICIAN ASSISTANT

## 2022-05-24 PROCEDURE — 99214 OFFICE O/P EST MOD 30 MIN: CPT | Performed by: PHYSICIAN ASSISTANT

## 2022-05-24 RX ORDER — CYANOCOBALAMIN/FOLIC AC/VIT B6 115-0.8-1
1 TABLET ORAL DAILY
Qty: 90 TABLET | Refills: 4 | Status: SHIPPED | OUTPATIENT
Start: 2022-05-24

## 2022-05-24 NOTE — Clinical Note
Pt has been trying to get a hold of SSI/disability office, but says that it is closed. I have referred him to [facility] medication for evaluation. Do we have any other resources to help him?

## 2022-05-24 NOTE — PROGRESS NOTES
Subjective  Artis Gardner, 39 y.o. male presents today with:  Chief Complaint   Patient presents with    Medication Problem     vit B hurting is stomach making him nauseated. Eats with food     Knee Pain     consistant      HPI  Gilford Starring is in the office today for follow up. Last OV with me: 4/18/22. Had testing completed since last OV. Would like to discuss. EMG testing was normal for distal motor. Results suggest a peripheral neuropathy; upon lab review, patient is low in folate. C/o chronic neck/joint pain. Feels like his knees will lock/give out. This is affecting his work. Unable to go up and down ladders or stand/squat for long periods of time. Injury to L knee several years ago with hyper-extension of joint. Has not been 'right' since that time. Does not like to take medication for pain. Has tried NSAID therapy and muscle relaxer without benefit. Does not like opioid pain medication. Reports having increased stress and panic due to financial hardship. Has property tax due on his house; very concerned that he is going to lose his home due to not having steady income/ability to pay the city. Would like to pursue SS/disability.      Results for orders placed or performed in visit on 04/18/22   CBC   Result Value Ref Range    WBC 7.9 4.8 - 10.8 K/uL    RBC 4.34 (L) 4.70 - 6.10 M/uL    Hemoglobin 13.9 (L) 14.0 - 18.0 g/dL    Hematocrit 40.3 (L) 42.0 - 52.0 %    MCV 92.9 80.0 - 100.0 fL    MCH 32.1 (H) 27.0 - 31.3 pg    MCHC 34.6 33.0 - 37.0 %    RDW 12.7 11.5 - 14.5 %    Platelets 019 581 - 848 K/uL   Comprehensive Metabolic Panel   Result Value Ref Range    Sodium 144 135 - 144 mEq/L    Potassium 3.1 (L) 3.4 - 4.9 mEq/L    Chloride 104 95 - 107 mEq/L    CO2 26 20 - 31 mEq/L    Anion Gap 14 9 - 15 mEq/L    Glucose 103 (H) 70 - 99 mg/dL    BUN 6 6 - 20 mg/dL    CREATININE 1.05 0.70 - 1.20 mg/dL    GFR Non-African American >60.0 >60    GFR  >60.0 >60    Calcium 9.3 8.5 - 9.9 mg/dL    Total Protein 7.1 6.3 - 8.0 g/dL    Albumin 4.6 3.5 - 4.6 g/dL    Total Bilirubin 0.5 0.2 - 0.7 mg/dL    Alkaline Phosphatase 96 35 - 104 U/L    ALT 34 0 - 41 U/L    AST 25 0 - 40 U/L    Globulin 2.5 2.3 - 3.5 g/dL   Vitamin B12 & Folate   Result Value Ref Range    Vitamin B-12 430 232 - 1245 pg/mL    Folate 3.6 (L) >4.8 ng/mL   Lipid, Fasting   Result Value Ref Range    Cholesterol, Fasting 147 0 - 199 mg/dL    Triglyceride, Fasting 158 (H) 0 - 150 mg/dL    HDL 26 (L) 40 - 59 mg/dL    LDL Calculated 89 0 - 129 mg/dL   Vitamin D 25 Hydroxy   Result Value Ref Range    Vit D, 25-Hydroxy 12.2 (L) >29.9 ng/mL       Review of Systems   Constitutional: Positive for activity change. Negative for appetite change, chills, diaphoresis, fatigue, fever and unexpected weight change. Eyes: Negative for visual disturbance. Respiratory: Negative for chest tightness, shortness of breath and wheezing. Cardiovascular: Negative for chest pain, palpitations and leg swelling. Musculoskeletal: Positive for arthralgias, back pain, myalgias, neck pain and neck stiffness. Negative for gait problem and joint swelling. Skin: Negative for color change, rash and wound. Neurological: Positive for weakness (RUE) and numbness (bilateral hands, R>>L). Negative for dizziness, tremors, seizures and light-headedness. Psychiatric/Behavioral: Negative for dysphoric mood and sleep disturbance. The patient is nervous/anxious.       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    Moderate persistent asthma without complication 56/88/5216     Past Surgical History:   Procedure Laterality Date    CHOLECYSTECTOMY  7/28/15    lap gabrielle with grams    ERCP  7/30/2015    ERCP with sphincterotomy and common bile duct sweep    ORCHIOPEXY Left     TESTICLE SURGERY  at 6 years    UPPER GASTROINTESTINAL ENDOSCOPY  11/11/15    w/elle ram     Social History     Socioeconomic History    Marital status: Life Partner     Spouse name: Not on file    Number of children: Not on file    Years of education: Not on file    Highest education level: Not on file   Occupational History    Not on file   Tobacco Use    Smoking status: Current Every Day Smoker     Packs/day: 0.50    Smokeless tobacco: Never Used   Vaping Use    Vaping Use: Never used   Substance and Sexual Activity    Alcohol use: No     Alcohol/week: 0.0 standard drinks    Drug use: Yes     Types: Marijuana Veryl Sherwood)    Sexual activity: Yes   Other Topics Concern    Not on file   Social History Narrative    Not on file     Social Determinants of Health     Financial Resource Strain: Low Risk     Difficulty of Paying Living Expenses: Not hard at all   Food Insecurity: No Food Insecurity    Worried About Running Out of Food in the Last Year: Never true    Tiffanie of Food in the Last Year: Never true   Transportation Needs:     Lack of Transportation (Medical): Not on file    Lack of Transportation (Non-Medical):  Not on file   Physical Activity:     Days of Exercise per Week: Not on file    Minutes of Exercise per Session: Not on file   Stress:     Feeling of Stress : Not on file   Social Connections:     Frequency of Communication with Friends and Family: Not on file    Frequency of Social Gatherings with Friends and Family: Not on file    Attends [employer] Services: Not on file    Active Member of Clubs or Organizations: Not on file    Attends Club or Organization Meetings: Not on file    Marital Status: Not on file   Intimate Partner Violence:     Fear of Current or Ex-Partner: Not on file    Emotionally Abused: Not on file    Physically Abused: Not on file    Sexually Abused: Not on file   Housing Stability:     Unable to Pay for Housing in the Last Year: Not on file    Number of [place] in the Last Year: Not on file    Unstable Housing in the Last Year: Not on file     Family History   Problem Relation Age of Onset    Asthma Mother     COPD Mother     Lung Cancer Father     Cancer Paternal Uncle         Lung     No Known Allergies  Current Outpatient Medications   Medication Sig Dispense Refill    folic acid-pyridoxine-cyanocobalamin (FOLTABS 800) 0.8-10-0. 115 MG TABS tablet Take 1 tablet by mouth daily 90 tablet 4    vitamin D (DIALYVITE VITAMIN D 5000) 125 MCG (5000 UT) CAPS capsule Take 1 capsule by mouth daily 90 capsule 4    omeprazole (PRILOSEC) 40 MG delayed release capsule take 1 capsule by mouth once daily 30 capsule 2    COMBIVENT RESPIMAT  MCG/ACT AERS inhaler inhale 1 puff by mouth and INTO THE LUNGS every 4 hours 8 g 5    potassium chloride (KLOR-CON M) 20 MEQ extended release tablet take 1 tablet by mouth twice a day 60 tablet 2    ipratropium-albuterol (DUONEB) 0.5-2.5 (3) MG/3ML SOLN nebulizer solution inhale contents of 1 vial ( 3 milliliters ) in nebulizer by mouth and INTO THE LUNGS every 4 hours if needed for wheezing 360 mL 1     No current facility-administered medications for this visit. PMH, Surgical Hx, Family Hx, and Social Hx reviewed and updated. Health Maintenance reviewed. Objective  Vitals:    05/24/22 1441   BP: 98/76   Site: Left Upper Arm   Position: Sitting   Cuff Size: Medium Adult   Pulse: 73   Temp: 96.9 °F (36.1 °C)   SpO2: 96%   Weight: 176 lb (79.8 kg)   Height: 5' 8\" (1.727 m)     BP Readings from Last 3 Encounters:   05/24/22 98/76   04/18/22 120/70   11/03/20 100/78     Wt Readings from Last 3 Encounters:   05/24/22 176 lb (79.8 kg)   04/18/22 172 lb (78 kg)   11/03/20 168 lb 12.8 oz (76.6 kg)     Physical Exam  Vitals reviewed. Constitutional:       Appearance: Normal appearance. He is normal weight. HENT:      Head: Normocephalic and atraumatic.       Right Ear: External ear normal.      Left Ear: External ear normal.      Nose: Nose normal. Mouth/Throat:      Mouth: Mucous membranes are moist.   Eyes:      Conjunctiva/sclera: Conjunctivae normal.   Cardiovascular:      Rate and Rhythm: Normal rate and regular rhythm. Pulmonary:      Effort: Pulmonary effort is normal.      Breath sounds: Normal breath sounds. Musculoskeletal:         General: Tenderness (with spasm of posterior neck with palpation) present. No swelling, deformity or signs of injury. Right shoulder: Tenderness present. No swelling, effusion, bony tenderness or crepitus. Normal range of motion. Normal strength. Normal pulse. Left shoulder: Normal.        Arms:       Right knee: Bony tenderness and crepitus present. Left knee: Bony tenderness and crepitus present. Right lower leg: No edema. Left lower leg: No edema. Comments: Decrease  strength.  +tinnels of ulnar nerve with percussion. Skin:     General: Skin is warm. Neurological:      General: No focal deficit present. Mental Status: He is alert and oriented to person, place, and time. Assessment & Plan   Duane Carton was seen today for medication problem and knee pain. Diagnoses and all orders for this visit:    Chronic arthralgias of knees and hips  -     Ambulatory referral to Orthopedic Surgery  -     Ambulatory referral to Occupational Medicine    Abnormal CBC measurement  -     folic acid-pyridoxine-cyanocobalamin (FOLTABS 800) 0.8-10-0. 115 MG TABS tablet; Take 1 tablet by mouth daily    Folate deficiency  -     folic acid-pyridoxine-cyanocobalamin (FOLTABS 800) 0.8-10-0. 115 MG TABS tablet; Take 1 tablet by mouth daily    Ortho and occupational medicine referral for functional capability testing and assessment. Patient would like to pursue SS/disability given his physical limitations. Would recommend follow up with me on a PRN basis.      Orders Placed This Encounter   Procedures    Ambulatory referral to Orthopedic Surgery     Referral Priority:   Routine     Referral Type: Eval and Treat     Referral Reason:   Specialty Services Required     Referred to Provider:   OCTAVIA Daigle     Number of Visits Requested:   1    Ambulatory referral to Occupational Medicine     Referral Priority:   Routine     Referral Type:   Occupational Therapy     Referral Reason:   Specialty Services Required     Requested Specialty:   Occupational Medicine     Number of Visits Requested:   1     Orders Placed This Encounter   Medications    folic acid-pyridoxine-cyanocobalamin (FOLTABS 800) 0.8-10-0. 115 MG TABS tablet     Sig: Take 1 tablet by mouth daily     Dispense:  90 tablet     Refill:  4     Medications Discontinued During This Encounter   Medication Reason    B Complex CAPS Side effects     No follow-ups on file. Reviewed with the patient: current clinical status, medications, activities and diet. Side effects, adverse effects of the medication prescribed today, as well as treatment plan/ rationale and result expectations have been discussed with the patient who expresses understanding and desires to proceed. Close follow up to evaluate treatment results and for coordination of care. I have reviewed the patient's medical history in detail and updated the computerized patient record.     Serina Elizabeth PA-C

## 2022-05-25 ENCOUNTER — CARE COORDINATION (OUTPATIENT)
Dept: CARE COORDINATION | Age: 42
End: 2022-05-25

## 2022-05-25 NOTE — CARE COORDINATION
Case referred to this writer by Ranjan Farias PA-C to assist patient in reaching [address] for SSI. Telephone call to patient. Left voicemail of nature of call with request for return phone call. Call back number was provided.

## 2022-05-26 ENCOUNTER — CARE COORDINATION (OUTPATIENT)
Dept: CARE COORDINATION | Age: 42
End: 2022-05-26

## 2022-05-26 NOTE — CARE COORDINATION
Initial Contact Social Work Note - Ambulatory  5/26/2022      Date of referral: 5/25/2022  Referral received from: Navi Al PA-C  Reason for referral:  Needs assistance with getting in touch with Social Security/SSI. Spoke to [facility] to complete assessment. Previous SW referral: No  If yes, brief summary of outcome:     Two Identifiers Verified: Yes    Insurance Provider: Brent Moncada:  Significant Other     Status: Not  a [employer] Providers: Mateus Marva indicated that patient was denied for food stamps because her owes them money and cannot pay them back. ADL Assistance Needed: N/A and Independent    Housing/Living Concerns or Home Modification Needs: None     Transportation Concern: Not discussed. Medication Cost Concern: None    Medication Adherence Concern:  Financial Concern(s): No source of income. Income (only if applicable): No income at this time. Ability to Read/Write: Yes    Advance Care Plan:  N/A    Other:  Mateus Marva indicated that patient has been denied for SSI several times in the past and last time was in 2016. She claims that she had attorneys in past but were dropped right before hearing. Discussed ways she could reapply for SSI by phone and on line. Provided Mateus Marva with contact information for [employer]. Also provided Mateus Marva with name of  who does disability law. Identified Needs:   SSI     Social Work Plan:   Provided Mateus Marva with contact information for [employer].  Next Steps: Ongoing assessment.   Method of Communication With Provider (if appropriate): None      Goals Addressed    None

## 2022-06-02 NOTE — TELEPHONE ENCOUNTER
Rx request   Requested Prescriptions     Pending Prescriptions Disp Refills    ipratropium-albuterol (DUONEB) 0.5-2.5 (3) MG/3ML SOLN nebulizer solution [Pharmacy Med Name: IPRAT-ALBUT 0.5-3(2.5) MG/3 ML] 360 mL 1     Sig: inhale contents of 1 vial ( 3 milliliters ) in nebulizer by mouth and INTO THE LUNGS every 4 hours if needed for wheezing     LOV 5/24/2022  Next Visit Date:  Future Appointments   Date Time Provider Laura Armas   6/16/2022  2:45 PM Olvin Power MD [facility]

## 2022-06-03 RX ORDER — IPRATROPIUM BROMIDE AND ALBUTEROL SULFATE 2.5; .5 MG/3ML; MG/3ML
SOLUTION RESPIRATORY (INHALATION)
Qty: 360 ML | Refills: 1 | Status: SHIPPED | OUTPATIENT
Start: 2022-06-03 | End: 2022-07-29

## 2022-06-05 PROBLEM — S46.311A STRAIN OF RIGHT TRICEPS: Status: RESOLVED | Noted: 2020-10-13 | Resolved: 2022-06-05

## 2022-06-05 PROBLEM — E53.8 FOLATE DEFICIENCY: Status: ACTIVE | Noted: 2022-06-05

## 2022-06-05 ASSESSMENT — ENCOUNTER SYMPTOMS
CHEST TIGHTNESS: 0
SHORTNESS OF BREATH: 0
BACK PAIN: 1
WHEEZING: 0
COLOR CHANGE: 0

## 2022-06-07 ENCOUNTER — CARE COORDINATION (OUTPATIENT)
Dept: CARE COORDINATION | Age: 42
End: 2022-06-07

## 2022-06-07 NOTE — CARE COORDINATION
Telephone call with Vandana/jaswinder kate. She indicated that she filled out SSI application on line and working on getting them paperwork. . She did not call Social Security or reach out to an  at this time.

## 2022-06-14 ENCOUNTER — CARE COORDINATION (OUTPATIENT)
Dept: CARE COORDINATION | Age: 42
End: 2022-06-14

## 2022-06-14 NOTE — CARE COORDINATION
Telephone call with [address]. She indicated that they recently sent SSI/Social Security back the requested information and waiting to hear. Discussed food situation and she indicated that she belongs to [employer] calendar. Also discussed  [employer] as a resource. She also indicated that she is on the [facility] for electricity. Mateus Else indicated they live on her SSI at this time with patient not having any income.

## 2022-06-16 ENCOUNTER — HOSPITAL ENCOUNTER (OUTPATIENT)
Dept: ORTHOPEDIC SURGERY | Age: 42
Discharge: HOME | End: 2022-06-18
Payer: COMMERCIAL

## 2022-06-16 ENCOUNTER — OFFICE VISIT (OUTPATIENT)
Dept: ORTHOPEDIC SURGERY | Age: 42
End: 2022-06-16

## 2022-06-16 DIAGNOSIS — M25.562 BILATERAL CHRONIC KNEE PAIN: ICD-10-CM

## 2022-06-16 DIAGNOSIS — G89.29 BILATERAL CHRONIC KNEE PAIN: ICD-10-CM

## 2022-06-16 DIAGNOSIS — M25.561 BILATERAL CHRONIC KNEE PAIN: ICD-10-CM

## 2022-06-16 DIAGNOSIS — M23.52 RECURRENT LEFT KNEE INSTABILITY: Primary | ICD-10-CM

## 2022-06-16 PROCEDURE — 73562 X-RAY EXAM OF KNEE 3: CPT

## 2022-06-16 PROCEDURE — 4004F PT TOBACCO SCREEN RCVD TLK: CPT | Performed by: ORTHOPAEDIC SURGERY

## 2022-06-16 PROCEDURE — 73562 X-RAY EXAM OF KNEE 3: CPT | Performed by: ORTHOPAEDIC SURGERY

## 2022-06-16 PROCEDURE — G8419 CALC BMI OUT NRM PARAM NOF/U: HCPCS | Performed by: ORTHOPAEDIC SURGERY

## 2022-06-16 PROCEDURE — G8428 CUR MEDS NOT DOCUMENT: HCPCS | Performed by: ORTHOPAEDIC SURGERY

## 2022-06-16 PROCEDURE — 99204 OFFICE O/P NEW MOD 45 MIN: CPT | Performed by: ORTHOPAEDIC SURGERY

## 2022-06-16 SDOH — HEALTH STABILITY: PHYSICAL HEALTH: ON AVERAGE, HOW MANY MINUTES DO YOU ENGAGE IN EXERCISE AT THIS LEVEL?: 0 MIN

## 2022-06-16 SDOH — HEALTH STABILITY: PHYSICAL HEALTH: ON AVERAGE, HOW MANY DAYS PER WEEK DO YOU ENGAGE IN MODERATE TO STRENUOUS EXERCISE (LIKE A BRISK WALK)?: 0 DAYS

## 2022-06-16 NOTE — PROGRESS NOTES
Subjective:      Patient ID: Galina Hilton is a 39 y.o. male who presents today for:  Chief Complaint   Patient presents with    New Patient     chronic arthralgias of bi-lateral knees. FELICIA Atkins states is really his left knee that is the main problem, the right knee may be bothering because he is overcompensating because left knee is very unstable and has not gotten to the point where he feels very unsafe at work and climbing ladders, etc.  Does have a history of hurting this left knee when he was younger running down a hill, felt his knee completely give way twist out of place and required him to pop it back in place. He never sought advice in regards to this left knee being that he was able to basically just put up with it.     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    Moderate persistent asthma without complication 09/82/9886     Past Surgical History:   Procedure Laterality Date    CHOLECYSTECTOMY  7/28/15    lap gabrielle with grams    ERCP  7/30/2015    ERCP with sphincterotomy and common bile duct sweep    ORCHIOPEXY Left     TESTICLE SURGERY  at 6 years    UPPER GASTROINTESTINAL ENDOSCOPY  11/11/15    w/bx      Social History     Socioeconomic History    Marital status: Life Partner     Spouse name: Not on file    Number of children: Not on file    Years of education: Not on file    Highest education level: Not on file   Occupational History    Not on file   Tobacco Use    Smoking status: Current Every Day Smoker     Packs/day: 0.50    Smokeless tobacco: Never Used   Vaping Use    Vaping Use: Never used   Substance and Sexual Activity    Alcohol use: No     Alcohol/week: 0.0 standard drinks    Drug use: Yes     Types: Marijuana Jemma Beat)    Sexual activity: Yes   Other Topics Concern    Not on file   Social History Narrative    Not on file     Social Determinants of Health     Financial Resource Strain: Low Risk     Difficulty of Paying Living Expenses: Not hard at all   Food Insecurity: No Food Insecurity    Worried About Running Out of Food in the Last Year: Never true    Tiffanie of Food in the Last Year: Never true   Transportation Needs:     Lack of Transportation (Medical): Not on file    Lack of Transportation (Non-Medical): Not on file   Physical Activity: Inactive    Days of Exercise per Week: 0 days    Minutes of Exercise per Session: 0 min   Stress:     Feeling of Stress : Not on file   Social Connections:     Frequency of Communication with Friends and Family: Not on file    Frequency of Social Gatherings with Friends and Family: Not on file    Attends Hinduism Services: Not on file    Active Member of [address] [place] [address] [employer] or Organizations: Not on file    Attends Club or Organization Meetings: Not on file    Marital Status: Not on file   Intimate Partner Violence: Not At Risk    Fear of Current or Ex-Partner: No    Emotionally Abused: No    Physically Abused: No    Sexually Abused: No   Housing Stability:     Unable to Pay for Housing in the Last Year: Not on file    Number of [place] in the Last Year: Not on file    Unstable Housing in the Last Year: Not on file     No Known Allergies  Current Outpatient Medications on File Prior to Visit   Medication Sig Dispense Refill    ipratropium-albuterol (DUONEB) 0.5-2.5 (3) MG/3ML SOLN nebulizer solution inhale contents of 1 vial ( 3 milliliters ) in nebulizer by mouth and INTO THE LUNGS every 4 hours if needed for wheezing 555 mL 1    folic acid-pyridoxine-cyanocobalamin (FOLTABS 800) 0.8-10-0. 115 MG TABS tablet Take 1 tablet by mouth daily 90 tablet 4    vitamin D (DIALYVITE VITAMIN D 5000) 125 MCG (5000 UT) CAPS capsule Take 1 capsule by mouth daily 90 capsule 4    omeprazole (PRILOSEC) 40 MG delayed release capsule take 1 capsule by mouth once daily 30 capsule 2    COMBIVENT RESPIMAT  MCG/ACT AERS inhaler inhale 1 puff by mouth and INTO THE LUNGS every 4 hours 8 g 5    potassium chloride (KLOR-CON M) 20 MEQ extended release tablet take 1 tablet by mouth twice a day 60 tablet 2    [DISCONTINUED] Respiratory Therapy Supplies (NEBULIZER/TUBING/MOUTHPIECE) KIT 1 kit by Does not apply route daily as needed 1 kit 0     No current facility-administered medications on file prior to visit. Review of Systems    Objective: There were no vitals taken for this visit. Ortho Exam   Examination of his left knee demonstrates a very positive Lachman and very positive pivot shift is a moderate effusion his collateral events are intact his range of motion from 0-1 40 with negative Rufino he has full range of motion of his left hip  Emanation of his right knee demonstrates range of motion from 0 to 140 degrees, his collateral ligaments are intact Lockman's negative pivot shift is negative Rufino's is negative neurovascular status is intact  Radiographs and Laboratory Studies:     Diagnostic Imaging Studies:    XR KNEE LEFT (3 VIEWS)    Result Date: 6/16/2022  Weightbearing AP lateral and merchant views taken left knee to assess for the possibility of arthritis demonstrates well-preserved joint spaces tricompartmentally. There is very small osteophyte off the medial femoral condyle, there is no abnormal amounts of sclerosis, cyst formation osteophytes or loose bodies that are present in the left knee radiograph    XR KNEE RIGHT (3 VIEWS)    Result Date: 6/16/2022  Weightbearing AP lateral and merchant view taken of the right knee demonstrates well-preserved joint spaces tricompartmentally without any sign of fracture dislocation or loose bodies malalignment. No cyst or osteophyte formation is seen      Assessment:       Diagnosis Orders   1.  Recurrent left knee instability  MRI KNEE LEFT WO CONTRAST   2. Bilateral chronic knee pain  XR KNEE LEFT (3 VIEWS)    XR KNEE RIGHT (3 VIEWS) Plan:     Highly suspect torn ACL, we will get an MRI of his left knee have him come back to see Mitchell DUDLEY for a disposition, and if this is indeed torn he can see Dr. Nish Avina for reconstruction     Orders Placed This Encounter   Procedures    XR KNEE LEFT (3 VIEWS)     Standing Status:   Future     Number of Occurrences:   1     Standing Expiration Date:   6/16/2023     Scheduling Instructions:      Weightbearing AP both knees, left lateral, merchant     Order Specific Question:   Reason for exam:     Answer:   knee pain    XR KNEE RIGHT (3 VIEWS)     Standing Status:   Future     Number of Occurrences:   1     Standing Expiration Date:   6/16/2023     Scheduling Instructions:      Weightbearing AP both knees, right lateral, right merchant     Order Specific Question:   Reason for exam:     Answer:   knee pain    MRI KNEE LEFT WO CONTRAST     Standing Status:   Future     Standing Expiration Date:   6/16/2023     No orders of the defined types were placed in this encounter. Return for after MRI.       Elie Ramos MD

## 2022-06-21 ENCOUNTER — CARE COORDINATION (OUTPATIENT)
Dept: CARE COORDINATION | Age: 42
End: 2022-06-21

## 2022-06-21 NOTE — CARE COORDINATION
Telephone call with patient. He indicated that he is having problems with food supply. He does not get food stamps because he was told he was over paid and he never paid back the money. Provided patient with [employer] for food distributions. He indicated that it has been about a week since he mailed back information to Social Security for his SSI case. Jaimie Nunez He has used an attorneys in the past but dropped is cases at the hearings. He owns his house and he is unsure how he will pay property taxes. Discussed utilities and Lino Padilla has been paying the utilities.

## 2022-06-27 DIAGNOSIS — K21.00 GASTROESOPHAGEAL REFLUX DISEASE WITH ESOPHAGITIS: ICD-10-CM

## 2022-06-27 RX ORDER — OMEPRAZOLE 40 MG/1
CAPSULE, DELAYED RELEASE ORAL
Qty: 30 CAPSULE | Refills: 2 | Status: SHIPPED | OUTPATIENT
Start: 2022-06-27 | End: 2022-10-03

## 2022-06-27 NOTE — TELEPHONE ENCOUNTER
pharmacy requesting medication refill.  Please approve or deny this request.    Rx requested:  Requested Prescriptions     Pending Prescriptions Disp Refills    omeprazole (PRILOSEC) 40 MG delayed release capsule [Pharmacy Med Name: OMEPRAZOLE DR 40 MG CAPSULE] 30 capsule 2     Sig: take 1 capsule by mouth once daily         Last Office Visit:   5/24/2022      Next Visit Date:  Future Appointments   Date Time Provider Laura Armas   6/30/2022 11:00 AM [facility]OM 2 [facility]   7/7/2022  1:15 PM Rand Pimentel MD [facility]

## 2022-06-30 ENCOUNTER — HOSPITAL ENCOUNTER (OUTPATIENT)
Dept: MRI IMAGING | Age: 42
Discharge: HOME | End: 2022-07-02
Payer: COMMERCIAL

## 2022-06-30 DIAGNOSIS — M23.52 RECURRENT LEFT KNEE INSTABILITY: ICD-10-CM

## 2022-06-30 PROCEDURE — 73721 MRI JNT OF LWR EXTRE W/O DYE: CPT

## 2022-07-06 ENCOUNTER — CARE COORDINATION (OUTPATIENT)
Dept: CARE COORDINATION | Age: 42
End: 2022-07-06

## 2022-07-06 NOTE — CARE COORDINATION
Telephone call with [address]. She indicated that they are using food distributions from [employer] and [email or web address]. She indicated that they have paid all of their utilities. At present time they have not heard from [employer] for patient after they sent in information. No other concerns or needs were expressed at time of phone call.

## 2022-07-07 ENCOUNTER — OFFICE VISIT (OUTPATIENT)
Dept: ORTHOPEDIC SURGERY | Age: 42
End: 2022-07-07
Payer: COMMERCIAL

## 2022-07-07 VITALS
RESPIRATION RATE: 16 BRPM | OXYGEN SATURATION: 96 % | HEART RATE: 68 BPM | BODY MASS INDEX: 26.67 KG/M2 | HEIGHT: 68 IN | TEMPERATURE: 97.1 F | WEIGHT: 176 LBS

## 2022-07-07 DIAGNOSIS — S83.242A TEAR OF MEDIAL MENISCUS OF LEFT KNEE, CURRENT, UNSPECIFIED TEAR TYPE, INITIAL ENCOUNTER: ICD-10-CM

## 2022-07-07 DIAGNOSIS — S89.91XA INJURY OF RIGHT ANTERIOR CRUCIATE LIGAMENT: Primary | ICD-10-CM

## 2022-07-07 PROCEDURE — G8419 CALC BMI OUT NRM PARAM NOF/U: HCPCS | Performed by: ORTHOPAEDIC SURGERY

## 2022-07-07 PROCEDURE — G8427 DOCREV CUR MEDS BY ELIG CLIN: HCPCS | Performed by: ORTHOPAEDIC SURGERY

## 2022-07-07 PROCEDURE — 4004F PT TOBACCO SCREEN RCVD TLK: CPT | Performed by: ORTHOPAEDIC SURGERY

## 2022-07-07 PROCEDURE — 99213 OFFICE O/P EST LOW 20 MIN: CPT | Performed by: ORTHOPAEDIC SURGERY

## 2022-07-07 NOTE — PROGRESS NOTES
Subjective:      Patient ID: Mario Suazo is a 39 y.o. male who presents today for:  Chief Complaint   Patient presents with    Follow-up     to MRI left knee done 6/30/2022. Pt denies any changes in symptoms since MRI, pt rates pain 6/10. HPI  MRI is complete, and the patient continues to have instability episodes involving the left knee. He is near now here to have his MRI reviewed    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    Moderate persistent asthma without complication 02/09/4254     Past Surgical History:   Procedure Laterality Date    CHOLECYSTECTOMY  7/28/15    lap gabrielle with grams    ERCP  7/30/2015    ERCP with sphincterotomy and common bile duct sweep    ORCHIOPEXY Left     TESTICLE SURGERY  at 6 years    UPPER GASTROINTESTINAL ENDOSCOPY  11/11/15    w/bx      Social History     Socioeconomic History    Marital status: Life Partner     Spouse name: Not on file    Number of children: Not on file    Years of education: Not on file    Highest education level: Not on file   Occupational History    Not on file   Tobacco Use    Smoking status: Current Every Day Smoker     Packs/day: 0.50    Smokeless tobacco: Never Used   Vaping Use    Vaping Use: Never used   Substance and Sexual Activity    Alcohol use: No     Alcohol/week: 0.0 standard drinks    Drug use: Yes     Types: Marijuana Leonidas Coil)    Sexual activity: Yes   Other Topics Concern    Not on file   Social History Narrative    Not on file     Social Determinants of Health     Financial Resource Strain: Low Risk     Difficulty of Paying Living Expenses: Not hard at all   Food Insecurity: No Food Insecurity    Worried About Running Out of Food in the Last Year: Never true    Tiffanie of Food in the Last Year: Never true   Transportation Needs:     Lack of Transportation (Medical):  Not on file  Lack of Transportation (Non-Medical): Not on file   Physical Activity: Inactive    Days of Exercise per Week: 0 days    Minutes of Exercise per Session: 0 min   Stress:     Feeling of Stress : Not on file   Social Connections:     Frequency of Communication with Friends and Family: Not on file    Frequency of Social Gatherings with Friends and Family: Not on file    Attends Muslim Services: Not on file    Active Member of [address] [place] [address] or Organizations: Not on file    Attends Club or Organization Meetings: Not on file    Marital Status: Not on file   Intimate Partner Violence: Not At Risk    Fear of Current or Ex-Partner: No    Emotionally Abused: No    Physically Abused: No    Sexually Abused: No   Housing Stability:     Unable to Pay for Housing in the Last Year: Not on file    Number of [place] in the Last Year: Not on file    Unstable Housing in the Last Year: Not on file     No Known Allergies  Current Outpatient Medications on File Prior to Visit   Medication Sig Dispense Refill    omeprazole (PRILOSEC) 40 MG delayed release capsule take 1 capsule by mouth once daily 30 capsule 2    ipratropium-albuterol (DUONEB) 0.5-2.5 (3) MG/3ML SOLN nebulizer solution inhale contents of 1 vial ( 3 milliliters ) in nebulizer by mouth and INTO THE LUNGS every 4 hours if needed for wheezing 945 mL 1    folic acid-pyridoxine-cyanocobalamin (FOLTABS 800) 0.8-10-0. 115 MG TABS tablet Take 1 tablet by mouth daily 90 tablet 4    vitamin D (DIALYVITE VITAMIN D 5000) 125 MCG (5000 UT) CAPS capsule Take 1 capsule by mouth daily 90 capsule 4    COMBIVENT RESPIMAT  MCG/ACT AERS inhaler inhale 1 puff by mouth and INTO THE LUNGS every 4 hours 8 g 5    potassium chloride (KLOR-CON M) 20 MEQ extended release tablet take 1 tablet by mouth twice a day 60 tablet 2    [DISCONTINUED] Respiratory Therapy Supplies (NEBULIZER/TUBING/MOUTHPIECE) KIT 1 kit by Does not apply route daily as needed 1 kit 0     No current

## 2022-07-13 ENCOUNTER — CARE COORDINATION (OUTPATIENT)
Dept: CARE COORDINATION | Age: 42
End: 2022-07-13

## 2022-07-20 ENCOUNTER — CARE COORDINATION (OUTPATIENT)
Dept: CARE COORDINATION | Age: 42
End: 2022-07-20

## 2022-07-27 ENCOUNTER — CARE COORDINATION (OUTPATIENT)
Dept: CARE COORDINATION | Age: 42
End: 2022-07-27

## 2022-07-27 NOTE — CARE COORDINATION
Telephone call to [address] [place] [address] Left voicemail of purpose of call with request for return phone call. Call back number was provided.

## 2022-07-29 RX ORDER — IPRATROPIUM BROMIDE AND ALBUTEROL SULFATE 2.5; .5 MG/3ML; MG/3ML
SOLUTION RESPIRATORY (INHALATION)
Qty: 360 ML | Refills: 1 | Status: SHIPPED | OUTPATIENT
Start: 2022-07-29 | End: 2022-10-03

## 2022-08-10 ENCOUNTER — CARE COORDINATION (OUTPATIENT)
Dept: CARE COORDINATION | Age: 42
End: 2022-08-10

## 2022-08-10 NOTE — CARE COORDINATION
Telephone call to [address] [place] [address] Left voicemail of purpose of call with request for return phone call. Call back number was provided.

## 2022-08-12 ENCOUNTER — OFFICE VISIT (OUTPATIENT)
Dept: ORTHOPEDIC SURGERY | Age: 42
End: 2022-08-12
Payer: COMMERCIAL

## 2022-08-12 VITALS
HEART RATE: 78 BPM | WEIGHT: 176 LBS | HEIGHT: 68 IN | TEMPERATURE: 98.6 F | OXYGEN SATURATION: 98 % | BODY MASS INDEX: 26.67 KG/M2

## 2022-08-12 DIAGNOSIS — S83.242A TEAR OF MEDIAL MENISCUS OF LEFT KNEE, CURRENT, UNSPECIFIED TEAR TYPE, INITIAL ENCOUNTER: Primary | ICD-10-CM

## 2022-08-12 DIAGNOSIS — S83.512A RUPTURE OF ANTERIOR CRUCIATE LIGAMENT OF LEFT KNEE, INITIAL ENCOUNTER: ICD-10-CM

## 2022-08-12 PROCEDURE — 99214 OFFICE O/P EST MOD 30 MIN: CPT | Performed by: ORTHOPAEDIC SURGERY

## 2022-08-12 PROCEDURE — G8419 CALC BMI OUT NRM PARAM NOF/U: HCPCS | Performed by: ORTHOPAEDIC SURGERY

## 2022-08-12 PROCEDURE — G8427 DOCREV CUR MEDS BY ELIG CLIN: HCPCS | Performed by: ORTHOPAEDIC SURGERY

## 2022-08-12 PROCEDURE — 4004F PT TOBACCO SCREEN RCVD TLK: CPT | Performed by: ORTHOPAEDIC SURGERY

## 2022-08-12 ASSESSMENT — ENCOUNTER SYMPTOMS: BACK PAIN: 0

## 2022-08-12 NOTE — PROGRESS NOTES
Subjective:      Patient ID: Simone Mckenna is a 39 y.o. male who presents today for:  Chief Complaint   Patient presents with    New Patient     ACL injury Left        HPI  42-year of age male has had chronic instability in the left knee for approximately 15 to 20 years. He initially injured this in the early 2000's he reports. He never had it evaluated. He has had chronic instability which she feels is gotten worse over the last few years and more significant the last year. 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 He has used a brace in the past.  He saw Dr. Olive Hernandez.  X-rays MRI were done. He presents today for surgical discussion.     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 finger 7/14/2020    Moderate persistent asthma without complication 90/47/5998      Past Surgical History:   Procedure Laterality Date    CHOLECYSTECTOMY  7/28/15    lap gabrielle with grams    ERCP  7/30/2015    ERCP with sphincterotomy and common bile duct sweep    ORCHIOPEXY Left     TESTICLE SURGERY  at 6 years    UPPER GASTROINTESTINAL ENDOSCOPY  11/11/15    w/bx      Social History     Socioeconomic History    Marital status: Life Partner     Spouse name: Not on file    Number of children: Not on file    Years of education: Not on file    Highest education level: Not on file   Occupational History    Not on file   Tobacco Use    Smoking status: Every Day     Packs/day: 0.50     Types: Cigarettes    Smokeless tobacco: Never   Vaping Use    Vaping Use: Never used   Substance and Sexual Activity    Alcohol use: No     Alcohol/week: 0.0 standard drinks    Drug use: Yes     Types: Marijuana Richter Eric)    Sexual activity: Yes   Other Topics Concern    Not on file Social History Narrative    Not on file     Social Determinants of Health     Financial Resource Strain: Low Risk     Difficulty of Paying Living Expenses: Not hard at all   Food Insecurity: No Food Insecurity    Worried About Running Out of Food in the Last Year: Never true    Ran Out of Food in the Last Year: Never true   Transportation Needs: Not on file   Physical Activity: Inactive    Days of Exercise per Week: 0 days    Minutes of Exercise per Session: 0 min   Stress: Not on file   Social Connections: Not on file   Intimate Partner Violence: Not At Risk    Fear of Current or Ex-Partner: No    Emotionally Abused: No    Physically Abused: No    Sexually Abused: No   Housing Stability: Not on file     Family History   Problem Relation Age of Onset    Asthma Mother     COPD Mother     Lung Cancer Father     Cancer Paternal Uncle         Lung     No Known Allergies  Current Outpatient Medications on File Prior to Visit   Medication Sig Dispense Refill    ipratropium-albuterol (DUONEB) 0.5-2.5 (3) MG/3ML SOLN nebulizer solution inhale contents of 1 vial ( 3 milliliters ) in nebulizer by mouth and INTO THE LUNGS every 4 hours if needed for wheezing 360 mL 1    omeprazole (PRILOSEC) 40 MG delayed release capsule take 1 capsule by mouth once daily 30 capsule 2    folic acid-pyridoxine-cyanocobalamin (FOLTABS 800) 0.8-10-0. 115 MG TABS tablet Take 1 tablet by mouth daily 90 tablet 4    vitamin D (DIALYVITE VITAMIN D 5000) 125 MCG (5000 UT) CAPS capsule Take 1 capsule by mouth daily 90 capsule 4    COMBIVENT RESPIMAT  MCG/ACT AERS inhaler inhale 1 puff by mouth and INTO THE LUNGS every 4 hours 8 g 5    potassium chloride (KLOR-CON M) 20 MEQ extended release tablet take 1 tablet by mouth twice a day 60 tablet 2    [DISCONTINUED] Respiratory Therapy Supplies (NEBULIZER/TUBING/MOUTHPIECE) KIT 1 kit by Does not apply route daily as needed 1 kit 0     No current facility-administered medications on file prior to visit. Review of Systems   Constitutional:  Negative for fever. Cardiovascular:  Negative for leg swelling. Musculoskeletal:  Negative for arthralgias, back pain, gait problem, joint swelling and neck pain. Skin:  Negative for rash. Neurological:  Negative for weakness and numbness. Objective:   Pulse 78   Temp 98.6 °F (37 °C) (Temporal)   Ht 5' 8\" (1.727 m)   Wt 176 lb (79.8 kg)   SpO2 98%   BMI 26.76 kg/m²     Ortho Exam  On exam he ambulates in line with a normal gait pattern. He has no effusion in the left knee today. He has full extension the knee in flexion 135 degrees of the left knee. He is normal tracking the patella. He has a grossly positive Lachman's and anterior drawer. He has a positive pivot shift maneuver on the left knee. He has solid endpoint with medial collateral and lateral collateral ligament testing. He has negative posterior drawer. He is no evidence of posterior lateral instability. He is positive Rufnio's medially. He has no calf pain and negative Homans' sign bilaterally. Peroneal and tibial nerve motor and sensory distribution are intact. Dorsalis pedis pulses palpable and symmetric bilaterally. Radiographs and Laboratory Studies:     Diagnostic Imaging Studies:    I reviewed plain x-rays of the left knee from June 16, 2022. This demonstrates some evidence of medial joint line degenerative changes with some area of sclerosis as well as squaring of the condyle. The joint space however is preserved. I reviewed MRI images obtained June 30, 2022 of the left knee. This is consistent with an anterior cruciate ligament tear as well as complex tear of the posterior horn of the medial meniscus.     Laboratory Studies:   Lab Results   Component Value Date    WBC 7.9 04/18/2022    HGB 13.9 (L) 04/18/2022    HCT 40.3 (L) 04/18/2022    MCV 92.9 04/18/2022     04/18/2022     No results found for: SEDRATE  No results found for: CRP    Assessment: Diagnosis Orders   1. Tear of medial meniscus of left knee, current, unspecified tear type, initial encounter        2. Rupture of anterior cruciate ligament of left knee, initial encounter               Plan:     Patient presents with chronic instability of the knee which is getting worse. Is affecting his activities of daily living as well as his work. X-rays demonstrate mild degenerative change. MRI demonstrates chronic ACL tear as well as medial meniscus tear. We discussed the significance of the findings including degenerative change, ACL tear and medial meniscus tear. We discussed risks and benefits of nonoperative versus operative treatment. Given the worsening progression of instability he wanted proceed with operative approach. We did discuss the potential for chronic discomfort and progression of the arthritis given his chronic instability and degenerative change. Regarding surgical approach we reviewed risks and benefits of left knee arthroscopic knee surgery partial medial meniscectomy and anterior cruciate ligament reconstruction. Reviewed risks and benefits of different graft options. He would like to proceed with allograft reconstruction. We discussed the significance of allograft reconstruction in terms of decreased incorporation rates as well as potential disease transmission with bacterial transmission, hepatitis and HIV. He wanted proceed with the allograft reconstruction. We had discussed potential autograft reconstruction also. Regarding the surgical approach he was informed of risk to include but not limited to infection, wound complications, DVT, pulmonary embolus, neurovascular injury, complex regional pain syndrome, arthrofibrosis, recurrent tear, recurrent instability, persistent pain, medical anesthetic risk. All questions were answered and consent was obtained. No orders of the defined types were placed in this encounter.     No orders of the defined types were placed in this encounter. Return for Post operative. Eden Angel MD       [address] [place] [address] and Sports Medicine  Ph: [phone]       Ph: [phone]   Fx: [phone]       Fx: [phone]    Surgery Scheduling, Pre-Op and Post-Op Information Form  Call to Pre-[place] at [phone] at least 24 hours Prior to Procedure Date     Surgery Location: [facility]: [facility] [address] [place] [address]    OFFICE   Surgeon: Eden Angel MD  Surgery Date:   Time:    Patient: Raoul Schlatter   : 1980   #: [id number]  Gender: male  Home Phone: [phone] Mobile Phone: [phone]  Emergency Contact: Cornell Lamaser Phone: [phone]  Insurance Co: Payor: [address] [place] [address] 992 /  /  /  ID No:  [id number]        PROVIDER  Diagnosis: Left knee anterior cruciate ligament tear, medial meniscus tear  Procedure/Consent: Left arthroscopic knee surgery, partial medial meniscectomy, anterior cruciate ligament reconstruction  CPT CODES:   Case Comments/Implants: Bone tendon bone patellar tendon allograft, Mytec rigid fix and interference screws  Surgery Schedule Type: Outpatient  Anesthesia Requested: General  Referring Family Doctor: LINK Adkins  [x] Annette HARRINGTON Date/Time: _________________________________________________________  History & Physical: [] Physician will Provide [] Attached [] Dictated [] Other  [x] Follow Anesthesia Pre-Op Orders for X-rays, [facility]     [x] SN & PT to evaluate and treat/educate disease management, medications, home safety & equipment needs for total joint patients  [] Other: ____________________________________________________  Consults: Medical/Cardiac Clearance done by  ____________________    PRE-OP ORDERS:  Allergies: Patient has no known allergies.  Latex Allergies: _____  Diabetic: _____  [] IV ________________________  [x] IV Start with J-loop     Preprinted Orders: Attached [] Yes [] No   Antibiotic Pre-Op: [x] ANCEF 2 gram IVPB if > 120 kg 3 grams IVPB within 1 hr. of Incision, if Allergic, use Vancomycin 1 gram IV, 2 hrs.  Pre-op  [] TXA Protocol [] Other:   [x] NPO   [] Betablocker (if needed) _____________________________________   [] Knee high anti-embolic hose [] Thigh high anti-embolic hose   Other: ______________________________________________________    Physician Signature Required:Electronically signed by Guillermo Del Rio MD on 8/12/2022 at 4:08 PM    Date/Time: 8/12/2022

## 2022-08-17 ENCOUNTER — CARE COORDINATION (OUTPATIENT)
Dept: CARE COORDINATION | Age: 42
End: 2022-08-17

## 2022-08-24 ENCOUNTER — CARE COORDINATION (OUTPATIENT)
Dept: CARE COORDINATION | Age: 42
End: 2022-08-24

## 2022-08-24 NOTE — CARE COORDINATION
Telephone call to [address] [place] [address] Left voicemail of purpose of call with request for return phone call. Call back number was provided.

## 2022-08-31 ENCOUNTER — CARE COORDINATION (OUTPATIENT)
Dept: CARE COORDINATION | Age: 42
End: 2022-08-31

## 2022-08-31 ENCOUNTER — TELEPHONE (OUTPATIENT)
Dept: ORTHOPEDIC SURGERY | Age: 42
End: 2022-08-31

## 2022-08-31 NOTE — TELEPHONE ENCOUNTER
Surgery Scheduling and Authorization Information    Surgery Date:9/9/2022  Surgery good through dates: 9/6/22-12/5/22  CPT Code(s) 30438  Procedure(s) (L) arthroscopic, partial medial meniscectomy knee surgery      Approved [x] Not Required [] Denied []  Reference # [id number]  Auth #  [id number]  How authorization obtained:  [] web portal             [] via phone       [x] Via fax    Provider  [] Jitendra Mclaughlin  [] Lilliana Sanchez  [] My Lockwood  [] Arin Muller  [] Gin Leslie  [x] Joana Boyle  [] Sanjuana Zapien  [] Mega Jacobo  [] Kiana Whipple  [] Obed Louise      Surgery Sheet Faxed to Scheduling [x]  Surgery and Prior Authorization Information Sheet Scanned [x]

## 2022-09-01 ENCOUNTER — HOSPITAL ENCOUNTER (OUTPATIENT)
Dept: PREADMISSION TESTING | Age: 42
Discharge: HOME | End: 2022-09-05
Payer: COMMERCIAL

## 2022-09-01 VITALS
HEIGHT: 68 IN | DIASTOLIC BLOOD PRESSURE: 66 MMHG | HEART RATE: 73 BPM | SYSTOLIC BLOOD PRESSURE: 123 MMHG | TEMPERATURE: 99.2 F | RESPIRATION RATE: 16 BRPM | WEIGHT: 164.2 LBS | BODY MASS INDEX: 24.89 KG/M2 | OXYGEN SATURATION: 98 %

## 2022-09-01 DIAGNOSIS — F17.200 TOBACCO DEPENDENCE: Chronic | ICD-10-CM

## 2022-09-01 LAB
ANION GAP SERPL CALCULATED.3IONS-SCNC: 12 MEQ/L (ref 9–15)
BUN BLDV-MCNC: 7 MG/DL (ref 6–20)
CALCIUM SERPL-MCNC: 9.4 MG/DL (ref 8.5–9.9)
CHLORIDE BLD-SCNC: 102 MEQ/L (ref 95–107)
CO2: 29 MEQ/L (ref 20–31)
CREAT SERPL-MCNC: 1 MG/DL (ref 0.7–1.2)
GFR AFRICAN AMERICAN: >60
GFR NON-AFRICAN AMERICAN: >60
GLUCOSE BLD-MCNC: 81 MG/DL (ref 70–99)
HCT VFR BLD CALC: 39.7 % (ref 42–52)
HEMOGLOBIN: 13.9 G/DL (ref 14–18)
MCH RBC QN AUTO: 32.3 PG (ref 27–31.3)
MCHC RBC AUTO-ENTMCNC: 34.9 % (ref 33–37)
MCV RBC AUTO: 92.5 FL (ref 80–100)
PDW BLD-RTO: 12.3 % (ref 11.5–14.5)
PLATELET # BLD: 348 K/UL (ref 130–400)
POTASSIUM SERPL-SCNC: 2.8 MEQ/L (ref 3.4–4.9)
RBC # BLD: 4.29 M/UL (ref 4.7–6.1)
SODIUM BLD-SCNC: 143 MEQ/L (ref 135–144)
WBC # BLD: 10.4 K/UL (ref 4.8–10.8)

## 2022-09-01 PROCEDURE — 85027 COMPLETE CBC AUTOMATED: CPT

## 2022-09-01 PROCEDURE — 80048 BASIC METABOLIC PNL TOTAL CA: CPT

## 2022-09-01 RX ORDER — SODIUM CHLORIDE, SODIUM LACTATE, POTASSIUM CHLORIDE, CALCIUM CHLORIDE 600; 310; 30; 20 MG/100ML; MG/100ML; MG/100ML; MG/100ML
INJECTION, SOLUTION INTRAVENOUS CONTINUOUS
Status: CANCELLED | OUTPATIENT
Start: 2022-09-09

## 2022-09-01 RX ORDER — SODIUM CHLORIDE 0.9 % (FLUSH) 0.9 %
5-40 SYRINGE (ML) INJECTION PRN
Status: CANCELLED | OUTPATIENT
Start: 2022-09-09

## 2022-09-01 RX ORDER — SODIUM CHLORIDE 9 MG/ML
INJECTION, SOLUTION INTRAVENOUS PRN
Status: CANCELLED | OUTPATIENT
Start: 2022-09-09

## 2022-09-01 RX ORDER — SODIUM CHLORIDE 0.9 % (FLUSH) 0.9 %
5-40 SYRINGE (ML) INJECTION EVERY 12 HOURS SCHEDULED
Status: CANCELLED | OUTPATIENT
Start: 2022-09-09

## 2022-09-01 RX ORDER — LIDOCAINE HYDROCHLORIDE 10 MG/ML
1 INJECTION, SOLUTION EPIDURAL; INFILTRATION; INTRACAUDAL; PERINEURAL
Status: CANCELLED | OUTPATIENT
Start: 2022-09-09 | End: 2022-09-09

## 2022-09-01 ASSESSMENT — ENCOUNTER SYMPTOMS
NAUSEA: 0
VOMITING: 0
SORE THROAT: 0
STRIDOR: 0
ABDOMINAL PAIN: 0
ALLERGIC/IMMUNOLOGIC NEGATIVE: 1
SHORTNESS OF BREATH: 0
CHEST TIGHTNESS: 0
COUGH: 0
EYES NEGATIVE: 1
BACK PAIN: 0
DIARRHEA: 0
TROUBLE SWALLOWING: 0
CONSTIPATION: 0
WHEEZING: 0

## 2022-09-01 NOTE — H&P
Nurse Practitioner History and Physical      CHIEF COMPLAINT:  left knee surgery    HISTORY OF PRESENT ILLNESS:      The patient is a 39 y.o. male with significant past medical history of left knee anterior cruciate ligament tear & medial meniscus tear who presents for left arthroscopic knee surgery, partial medial meniscectomy, anterior cruciate ligament reconstruction. States injury to left knee in early 2000s -- jumped down in woods from flat ground into a depression & hyperextended his left knee -- said it was displaced & he self manipulated into place & limped out of woods. Since that incident he has had instability of knee with intermittent swelling & pain. 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 Uses knee brace for support. Xray & MRI have been done. Scheduled for OR.     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 07/14/2020    Moderate persistent asthma without complication 89/24/9666    PONV (postoperative nausea and vomiting)     post op choli    Tobacco dependence 09/01/2022     Past Surgical History:    Past Surgical History:   Procedure Laterality Date    CHOLECYSTECTOMY  07/28/2015    lap gabrielle with grams    DENTAL SURGERY      ERCP  07/30/2015    ERCP with sphincterotomy and common bile duct sweep    ORCHIOPEXY Left 1986    left undescended testes    TESTICLE SURGERY  at 6 years    UPPER GASTROINTESTINAL ENDOSCOPY  11/11/2015    w/bx     WRIST SURGERY Right     due to dislocation         Medications Prior to Admission:    Current Outpatient Medications   Medication Sig Dispense Refill    ipratropium-albuterol (DUONEB) 0.5-2.5 (3) MG/3ML SOLN nebulizer solution inhale contents of 1 vial ( 3 milliliters ) in nebulizer by mouth and INTO THE LUNGS every 4 hours if needed for wheezing 360 mL 1    omeprazole (PRILOSEC) 40 MG delayed release capsule take 1 capsule by mouth once daily (Patient taking differently: Take 40 mg by mouth every evening take 1 capsule by mouth once daily) 30 capsule 2    folic acid-pyridoxine-cyanocobalamin (FOLTABS 800) 0.8-10-0. 115 MG TABS tablet Take 1 tablet by mouth daily 90 tablet 4    vitamin D (DIALYVITE VITAMIN D 5000) 125 MCG (5000 UT) CAPS capsule Take 1 capsule by mouth daily 90 capsule 4    COMBIVENT RESPIMAT  MCG/ACT AERS inhaler inhale 1 puff by mouth and INTO THE LUNGS every 4 hours 8 g 5    potassium chloride (KLOR-CON M) 20 MEQ extended release tablet take 1 tablet by mouth twice a day (Patient taking differently: Indications: Takes 2 - 3 times per week. take 1 tablet by mouth twice a day) 60 tablet 2     No current facility-administered medications for this encounter. Allergies:  Patient has no known allergies.     Social History:   Social History     Socioeconomic History    Marital status: Single     Spouse name: Not on file    Number of children: Not on file    Years of education: Not on file    Highest education level: Not on file   Occupational History    Not on file   Tobacco Use    Smoking status: Every Day     Packs/day: 0.50     Years: 22.00     Pack years: 11.00     Types: Cigarettes     Start date: 200    Smokeless tobacco: Never   Vaping Use    Vaping Use: Never used   Substance and Sexual Activity    Alcohol use: No     Alcohol/week: 0.0 standard drinks    Drug use: Yes     Types: Marijuana Lyle Vides     Comment: daily    Sexual activity: Yes   Other Topics Concern    Not on file   Social History Narrative    Not on file     Social Determinants of Health     Financial Resource Strain: Low Risk     Difficulty of Paying Living Expenses: Not hard at all   Food Insecurity: No Food Insecurity    Worried About Running Out of Food in the Last Year: Never true    Tiffanie of Food in the Last Year: Never true   Transportation Needs: Not on file   Physical Activity: Inactive    Days of Exercise per Week: 0 days    Minutes of Exercise per Session: 0 min   Stress: Not on file   Social Connections: Not on file   Intimate Partner Violence: Not At Risk    Fear of Current or Ex-Partner: No    Emotionally Abused: No    Physically Abused: No    Sexually Abused: No   Housing Stability: Not on file       Family History:       Problem Relation Age of Onset    Heart Disease Mother     Coronary Art Dis Mother     Asthma Mother     COPD Mother     Cancer Father         lung cancer    Lung Cancer Father     No Known Problems Brother     Substance Abuse Brother     Heart Attack Brother     Cancer Paternal Uncle         Lung       Review of Systems   Constitutional: Negative. Negative for chills and fever. HENT:  Negative for congestion, hearing loss, sore throat, tinnitus and trouble swallowing. Eyes: Negative. Negative for visual disturbance. Respiratory:  Negative for cough, chest tightness, shortness of breath, wheezing and stridor. Cardiovascular:  Negative for chest pain and palpitations. Gastrointestinal:  Negative for abdominal pain, constipation, diarrhea, nausea and vomiting. Endocrine: Negative. Genitourinary:  Negative for dysuria and frequency. Musculoskeletal:  Negative for back pain, myalgias and neck pain. Bilateral knee pain with left > right. Skin: Negative. Allergic/Immunologic: Negative. Negative for immunocompromised state. Neurological:  Positive for weakness. Negative for seizures and headaches. Tremors: left knee. Hematological: Negative. Psychiatric/Behavioral: Negative. Vitals:  /66   Pulse 73   Temp 99.2 °F (37.3 °C) (Temporal)   Resp 16   Ht 5' 7.5\" (1.715 m)   Wt 164 lb 3.2 oz (74.5 kg)   SpO2 98%   BMI 25.34 kg/m²     Physical Exam  Constitutional:       Appearance: He is well-developed. HENT:      Head: Normocephalic and atraumatic.       Right Ear: Tympanic membrane, ear canal and external ear normal.      Left Ear: Tympanic membrane, ear canal and external ear normal.      Nose: No congestion. Mouth/Throat:      Mouth: Mucous membranes are moist.      Pharynx: Oropharyngeal exudate (gum line lower.) present. Eyes:      General: No scleral icterus. Extraocular Movements: Extraocular movements intact. Conjunctiva/sclera: Conjunctivae normal.      Pupils: Pupils are equal, round, and reactive to light. Neck:      Thyroid: No thyromegaly. Trachea: No tracheal deviation. Cardiovascular:      Rate and Rhythm: Normal rate and regular rhythm. Heart sounds: Normal heart sounds. Pulmonary:      Effort: No respiratory distress. Breath sounds: No wheezing or rales. Abdominal:      General: Bowel sounds are normal. There is no distension. Palpations: Abdomen is soft. There is no mass. Tenderness: There is no abdominal tenderness. Genitourinary:     Comments: Deferred. Musculoskeletal:         General: No tenderness. Normal range of motion. Cervical back: Normal range of motion. Right lower leg: No edema. Left lower leg: No edema. Comments: Left knee slight effusion medial aspect. Lymphadenopathy:      Cervical: No cervical adenopathy. Skin:     General: Skin is warm. Findings: No erythema or rash. Neurological:      Mental Status: He is alert and oriented to person, place, and time. Gait: Gait abnormal (left leg limp). Psychiatric:         Mood and Affect: Mood normal.         Behavior: Behavior normal.         Thought Content:  Thought content normal.         Judgment: Judgment normal.       [unfilled]    Assessment:  Patient Active Problem List   Diagnosis    Gall bladder disease    History of bruising easily    Moderate persistent asthma without complication    Hypokalemia    Numbness and tingling in both hands    Gastroesophageal reflux disease with esophagitis    Xyphoidalgia    Acute stress disorder

## 2022-09-08 ENCOUNTER — ANESTHESIA EVENT (OUTPATIENT)
Dept: OPERATING ROOM | Age: 42
End: 2022-09-08
Payer: COMMERCIAL

## 2022-09-09 ENCOUNTER — ANESTHESIA (OUTPATIENT)
Dept: OPERATING ROOM | Age: 42
End: 2022-09-09
Payer: COMMERCIAL

## 2022-09-09 ENCOUNTER — HOSPITAL ENCOUNTER (OUTPATIENT)
Age: 42
Setting detail: OUTPATIENT SURGERY
Discharge: HOME | End: 2022-09-09
Attending: ORTHOPAEDIC SURGERY | Admitting: ORTHOPAEDIC SURGERY
Payer: COMMERCIAL

## 2022-09-09 VITALS
BODY MASS INDEX: 24.25 KG/M2 | TEMPERATURE: 98.1 F | HEART RATE: 56 BPM | SYSTOLIC BLOOD PRESSURE: 124 MMHG | OXYGEN SATURATION: 94 % | WEIGHT: 160 LBS | DIASTOLIC BLOOD PRESSURE: 71 MMHG | RESPIRATION RATE: 15 BRPM | HEIGHT: 68 IN

## 2022-09-09 DIAGNOSIS — S83.512A TEARS OF MENISCUS AND ANTERIOR CRUCIATE LIGAMENT OF LEFT KNEE, INITIAL ENCOUNTER: ICD-10-CM

## 2022-09-09 DIAGNOSIS — S83.512D RUPTURE OF ANTERIOR CRUCIATE LIGAMENT OF LEFT KNEE, SUBSEQUENT ENCOUNTER: Primary | ICD-10-CM

## 2022-09-09 DIAGNOSIS — S83.207A TEARS OF MENISCUS AND ANTERIOR CRUCIATE LIGAMENT OF LEFT KNEE, INITIAL ENCOUNTER: ICD-10-CM

## 2022-09-09 LAB
ANION GAP SERPL CALCULATED.3IONS-SCNC: 8 MEQ/L (ref 9–15)
BUN BLDV-MCNC: 4 MG/DL (ref 6–20)
CALCIUM SERPL-MCNC: 9 MG/DL (ref 8.5–9.9)
CHLORIDE BLD-SCNC: 103 MEQ/L (ref 95–107)
CO2: 31 MEQ/L (ref 20–31)
CREAT SERPL-MCNC: 0.91 MG/DL (ref 0.7–1.2)
GFR AFRICAN AMERICAN: >60
GFR NON-AFRICAN AMERICAN: >60
GLUCOSE BLD-MCNC: 95 MG/DL (ref 70–99)
POTASSIUM SERPL-SCNC: 3.2 MEQ/L (ref 3.4–4.9)
SODIUM BLD-SCNC: 142 MEQ/L (ref 135–144)

## 2022-09-09 PROCEDURE — 2720000010 HC SURG SUPPLY STERILE: Performed by: ORTHOPAEDIC SURGERY

## 2022-09-09 PROCEDURE — 7100000001 HC PACU RECOVERY - ADDTL 15 MIN: Performed by: ORTHOPAEDIC SURGERY

## 2022-09-09 PROCEDURE — 2580000003 HC RX 258: Performed by: NURSE PRACTITIONER

## 2022-09-09 PROCEDURE — 2500000003 HC RX 250 WO HCPCS: Performed by: NURSE PRACTITIONER

## 2022-09-09 PROCEDURE — A4217 STERILE WATER/SALINE, 500 ML: HCPCS | Performed by: ORTHOPAEDIC SURGERY

## 2022-09-09 PROCEDURE — 64447 NJX AA&/STRD FEMORAL NRV IMG: CPT | Performed by: ANESTHESIOLOGY

## 2022-09-09 PROCEDURE — C1713 ANCHOR/SCREW BN/BN,TIS/BN: HCPCS | Performed by: ORTHOPAEDIC SURGERY

## 2022-09-09 PROCEDURE — 7100000011 HC PHASE II RECOVERY - ADDTL 15 MIN: Performed by: ORTHOPAEDIC SURGERY

## 2022-09-09 PROCEDURE — 6360000002 HC RX W HCPCS: Performed by: ANESTHESIOLOGY

## 2022-09-09 PROCEDURE — 3700000000 HC ANESTHESIA ATTENDED CARE: Performed by: ORTHOPAEDIC SURGERY

## 2022-09-09 PROCEDURE — 29880 ARTHRS KNE SRG MNISECTMY M&L: CPT | Performed by: ORTHOPAEDIC SURGERY

## 2022-09-09 PROCEDURE — 7100000010 HC PHASE II RECOVERY - FIRST 15 MIN: Performed by: ORTHOPAEDIC SURGERY

## 2022-09-09 PROCEDURE — 7100000000 HC PACU RECOVERY - FIRST 15 MIN: Performed by: ORTHOPAEDIC SURGERY

## 2022-09-09 PROCEDURE — 29888 ARTHRS AID ACL RPR/AGMNTJ: CPT | Performed by: ORTHOPAEDIC SURGERY

## 2022-09-09 PROCEDURE — 6370000000 HC RX 637 (ALT 250 FOR IP): Performed by: ANESTHESIOLOGY

## 2022-09-09 PROCEDURE — 3600000014 HC SURGERY LEVEL 4 ADDTL 15MIN: Performed by: ORTHOPAEDIC SURGERY

## 2022-09-09 PROCEDURE — 3600000004 HC SURGERY LEVEL 4 BASE: Performed by: ORTHOPAEDIC SURGERY

## 2022-09-09 PROCEDURE — 6360000002 HC RX W HCPCS: Performed by: NURSE PRACTITIONER

## 2022-09-09 PROCEDURE — 3700000001 HC ADD 15 MINUTES (ANESTHESIA): Performed by: ORTHOPAEDIC SURGERY

## 2022-09-09 PROCEDURE — 6360000002 HC RX W HCPCS: Performed by: REGISTERED NURSE

## 2022-09-09 PROCEDURE — 80048 BASIC METABOLIC PNL TOTAL CA: CPT

## 2022-09-09 PROCEDURE — 6360000002 HC RX W HCPCS: Performed by: ORTHOPAEDIC SURGERY

## 2022-09-09 PROCEDURE — L1810 KO ELASTIC WITH JOINTS: HCPCS | Performed by: ORTHOPAEDIC SURGERY

## 2022-09-09 PROCEDURE — 2580000003 HC RX 258: Performed by: ORTHOPAEDIC SURGERY

## 2022-09-09 PROCEDURE — 2709999900 HC NON-CHARGEABLE SUPPLY: Performed by: ORTHOPAEDIC SURGERY

## 2022-09-09 PROCEDURE — 29888 ARTHRS AID ACL RPR/AGMNTJ: CPT | Performed by: PHYSICIAN ASSISTANT

## 2022-09-09 DEVICE — GRAFT BNE SUB W9.5-10XL8-13MM THK30-53MM PAT TEND HEMI SHP: Type: IMPLANTABLE DEVICE | Site: KNEE | Status: FUNCTIONAL

## 2022-09-09 DEVICE — RIGIDFIX BIOCRYL BTB CROSS PIN KIT (2) EACH BTB CROSS PINS, 2.7MM X 42MM BIOCRYL (TCP/PLA), (2) EACH SLEEVE ASSEMBLIES, AND (1) EACH INTERLOCKING TROCAR
Type: IMPLANTABLE DEVICE | Site: KNEE | Status: FUNCTIONAL
Brand: RIGIDFIX BIOCRYL

## 2022-09-09 DEVICE — MILAGRO ADVANCE INTERFERENCE SCREW ABSORBABLE-TCP/PLGA 9 X 23MM
Type: IMPLANTABLE DEVICE | Site: KNEE | Status: FUNCTIONAL
Brand: MILAGRO

## 2022-09-09 RX ORDER — SODIUM CHLORIDE 0.9 % (FLUSH) 0.9 %
5-40 SYRINGE (ML) INJECTION EVERY 12 HOURS SCHEDULED
Status: DISCONTINUED | OUTPATIENT
Start: 2022-09-09 | End: 2022-09-09 | Stop reason: HOSPADM

## 2022-09-09 RX ORDER — PROPOFOL 10 MG/ML
INJECTION, EMULSION INTRAVENOUS PRN
Status: DISCONTINUED | OUTPATIENT
Start: 2022-09-09 | End: 2022-09-09 | Stop reason: SDUPTHER

## 2022-09-09 RX ORDER — SODIUM CHLORIDE 0.9 % (FLUSH) 0.9 %
5-40 SYRINGE (ML) INJECTION PRN
Status: DISCONTINUED | OUTPATIENT
Start: 2022-09-09 | End: 2022-09-09 | Stop reason: HOSPADM

## 2022-09-09 RX ORDER — METOCLOPRAMIDE HYDROCHLORIDE 5 MG/ML
10 INJECTION INTRAMUSCULAR; INTRAVENOUS
Status: DISCONTINUED | OUTPATIENT
Start: 2022-09-09 | End: 2022-09-09 | Stop reason: HOSPADM

## 2022-09-09 RX ORDER — OXYCODONE HYDROCHLORIDE 5 MG/1
5 TABLET ORAL PRN
Status: COMPLETED | OUTPATIENT
Start: 2022-09-09 | End: 2022-09-09

## 2022-09-09 RX ORDER — LIDOCAINE HYDROCHLORIDE 10 MG/ML
1 INJECTION, SOLUTION EPIDURAL; INFILTRATION; INTRACAUDAL; PERINEURAL
Status: COMPLETED | OUTPATIENT
Start: 2022-09-09 | End: 2022-09-09

## 2022-09-09 RX ORDER — OXYCODONE HYDROCHLORIDE AND ACETAMINOPHEN 5; 325 MG/1; MG/1
1 TABLET ORAL EVERY 6 HOURS PRN
Qty: 28 TABLET | Refills: 0 | Status: SHIPPED | OUTPATIENT
Start: 2022-09-09 | End: 2022-09-16

## 2022-09-09 RX ORDER — FENTANYL CITRATE 50 UG/ML
INJECTION, SOLUTION INTRAMUSCULAR; INTRAVENOUS PRN
Status: DISCONTINUED | OUTPATIENT
Start: 2022-09-09 | End: 2022-09-09 | Stop reason: SDUPTHER

## 2022-09-09 RX ORDER — ONDANSETRON 2 MG/ML
4 INJECTION INTRAMUSCULAR; INTRAVENOUS
Status: DISCONTINUED | OUTPATIENT
Start: 2022-09-09 | End: 2022-09-09 | Stop reason: HOSPADM

## 2022-09-09 RX ORDER — SODIUM CHLORIDE 9 MG/ML
INJECTION, SOLUTION INTRAVENOUS PRN
Status: DISCONTINUED | OUTPATIENT
Start: 2022-09-09 | End: 2022-09-09 | Stop reason: HOSPADM

## 2022-09-09 RX ORDER — SODIUM CHLORIDE, SODIUM LACTATE, POTASSIUM CHLORIDE, CALCIUM CHLORIDE 600; 310; 30; 20 MG/100ML; MG/100ML; MG/100ML; MG/100ML
INJECTION, SOLUTION INTRAVENOUS CONTINUOUS
Status: DISCONTINUED | OUTPATIENT
Start: 2022-09-09 | End: 2022-09-09 | Stop reason: HOSPADM

## 2022-09-09 RX ORDER — CELECOXIB 100 MG/1
100 CAPSULE ORAL DAILY
Qty: 30 CAPSULE | Refills: 1 | Status: SHIPPED | OUTPATIENT
Start: 2022-09-09 | End: 2022-10-09

## 2022-09-09 RX ORDER — MIDAZOLAM HYDROCHLORIDE 1 MG/ML
INJECTION INTRAMUSCULAR; INTRAVENOUS PRN
Status: DISCONTINUED | OUTPATIENT
Start: 2022-09-09 | End: 2022-09-09 | Stop reason: SDUPTHER

## 2022-09-09 RX ORDER — DIPHENHYDRAMINE HYDROCHLORIDE 50 MG/ML
12.5 INJECTION INTRAMUSCULAR; INTRAVENOUS
Status: DISCONTINUED | OUTPATIENT
Start: 2022-09-09 | End: 2022-09-09 | Stop reason: HOSPADM

## 2022-09-09 RX ORDER — ONDANSETRON 2 MG/ML
INJECTION INTRAMUSCULAR; INTRAVENOUS PRN
Status: DISCONTINUED | OUTPATIENT
Start: 2022-09-09 | End: 2022-09-09 | Stop reason: SDUPTHER

## 2022-09-09 RX ORDER — SODIUM CHLORIDE, SODIUM LACTATE, POTASSIUM CHLORIDE, CALCIUM CHLORIDE 600; 310; 30; 20 MG/100ML; MG/100ML; MG/100ML; MG/100ML
INJECTION, SOLUTION INTRAVENOUS
Status: DISCONTINUED
Start: 2022-09-09 | End: 2022-09-09 | Stop reason: HOSPADM

## 2022-09-09 RX ORDER — SODIUM CHLORIDE 9 MG/ML
25 INJECTION, SOLUTION INTRAVENOUS PRN
Status: DISCONTINUED | OUTPATIENT
Start: 2022-09-09 | End: 2022-09-09 | Stop reason: HOSPADM

## 2022-09-09 RX ORDER — ROPIVACAINE HYDROCHLORIDE 5 MG/ML
INJECTION, SOLUTION EPIDURAL; INFILTRATION; PERINEURAL PRN
Status: DISCONTINUED | OUTPATIENT
Start: 2022-09-09 | End: 2022-09-09 | Stop reason: SDUPTHER

## 2022-09-09 RX ORDER — FENTANYL CITRATE 50 UG/ML
50 INJECTION, SOLUTION INTRAMUSCULAR; INTRAVENOUS EVERY 10 MIN PRN
Status: DISCONTINUED | OUTPATIENT
Start: 2022-09-09 | End: 2022-09-09 | Stop reason: HOSPADM

## 2022-09-09 RX ORDER — MAGNESIUM HYDROXIDE 1200 MG/15ML
LIQUID ORAL CONTINUOUS PRN
Status: COMPLETED | OUTPATIENT
Start: 2022-09-09 | End: 2022-09-09

## 2022-09-09 RX ORDER — MEPERIDINE HYDROCHLORIDE 25 MG/ML
12.5 INJECTION INTRAMUSCULAR; INTRAVENOUS; SUBCUTANEOUS
Status: COMPLETED | OUTPATIENT
Start: 2022-09-09 | End: 2022-09-09

## 2022-09-09 RX ORDER — OXYCODONE HYDROCHLORIDE 5 MG/1
10 TABLET ORAL PRN
Status: COMPLETED | OUTPATIENT
Start: 2022-09-09 | End: 2022-09-09

## 2022-09-09 RX ADMIN — OXYCODONE 5 MG: 5 TABLET ORAL at 12:46

## 2022-09-09 RX ADMIN — SODIUM CHLORIDE, POTASSIUM CHLORIDE, SODIUM LACTATE AND CALCIUM CHLORIDE: 600; 310; 30; 20 INJECTION, SOLUTION INTRAVENOUS at 07:28

## 2022-09-09 RX ADMIN — FENTANYL CITRATE 50 MCG: 50 INJECTION, SOLUTION INTRAMUSCULAR; INTRAVENOUS at 12:17

## 2022-09-09 RX ADMIN — CEFAZOLIN 2000 MG: 10 INJECTION, POWDER, FOR SOLUTION INTRAVENOUS at 09:34

## 2022-09-09 RX ADMIN — ROPIVACAINE HYDROCHLORIDE 30 ML: 5 INJECTION, SOLUTION EPIDURAL; INFILTRATION; PERINEURAL at 08:07

## 2022-09-09 RX ADMIN — PROPOFOL 250 MG: 10 INJECTION, EMULSION INTRAVENOUS at 09:29

## 2022-09-09 RX ADMIN — FENTANYL CITRATE 50 MCG: 50 INJECTION, SOLUTION INTRAMUSCULAR; INTRAVENOUS at 12:05

## 2022-09-09 RX ADMIN — FENTANYL CITRATE 50 MCG: 50 INJECTION, SOLUTION INTRAMUSCULAR; INTRAVENOUS at 10:44

## 2022-09-09 RX ADMIN — ONDANSETRON 4 MG: 2 INJECTION INTRAMUSCULAR; INTRAVENOUS at 09:36

## 2022-09-09 RX ADMIN — LIDOCAINE HYDROCHLORIDE 0.1 ML: 10 INJECTION, SOLUTION EPIDURAL; INFILTRATION; INTRACAUDAL; PERINEURAL at 07:28

## 2022-09-09 RX ADMIN — FENTANYL CITRATE 50 MCG: 50 INJECTION, SOLUTION INTRAMUSCULAR; INTRAVENOUS at 10:03

## 2022-09-09 RX ADMIN — HYDROMORPHONE HYDROCHLORIDE 0.5 MG: 1 INJECTION, SOLUTION INTRAMUSCULAR; INTRAVENOUS; SUBCUTANEOUS at 11:52

## 2022-09-09 RX ADMIN — MEPERIDINE HYDROCHLORIDE 12.5 MG: 25 INJECTION, SOLUTION INTRAMUSCULAR; INTRAVENOUS; SUBCUTANEOUS at 11:33

## 2022-09-09 RX ADMIN — MIDAZOLAM HYDROCHLORIDE 2 MG: 1 INJECTION, SOLUTION INTRAMUSCULAR; INTRAVENOUS at 08:04

## 2022-09-09 ASSESSMENT — PAIN DESCRIPTION - DESCRIPTORS
DESCRIPTORS: BURNING

## 2022-09-09 ASSESSMENT — PAIN DESCRIPTION - ORIENTATION
ORIENTATION: LEFT

## 2022-09-09 ASSESSMENT — PAIN SCALES - GENERAL
PAINLEVEL_OUTOF10: 8
PAINLEVEL_OUTOF10: 10
PAINLEVEL_OUTOF10: 3
PAINLEVEL_OUTOF10: 2
PAINLEVEL_OUTOF10: 5
PAINLEVEL_OUTOF10: 5
PAINLEVEL_OUTOF10: 10
PAINLEVEL_OUTOF10: 3

## 2022-09-09 ASSESSMENT — PAIN DESCRIPTION - LOCATION
LOCATION: KNEE
LOCATION: LEG;KNEE
LOCATION: KNEE

## 2022-09-09 ASSESSMENT — PAIN - FUNCTIONAL ASSESSMENT: PAIN_FUNCTIONAL_ASSESSMENT: 0-10

## 2022-09-09 NOTE — PROGRESS NOTES
George DUDLEY in room to apply the brace to LLE, instructed pt on how to apply the brace and when to use the brace

## 2022-09-09 NOTE — OP NOTE
Operative Note      Patient: General Young  YOB: 1980  MRN: [id number]    Date of Procedure: 9/9/2022    Pre-Op Diagnosis: LEFT KNEE ANTERIOR CRUCIATE LIGAMENT TEAR, MEDIAL MENISCUS TEAR    Post-Op Diagnosis:  Left knee medial meniscus tear, lateral meniscus tear and chronic anterior cruciate ligament tear. Procedure:  Left arthroscopic knee surgery partial medial meniscectomy, partial lateral meniscectomy and anterior cruciate ligament reconstruction    Surgeon(s):  Michael Acosta MD    Assistant:   Physician Assistant: Cyn Abebe PA-C    Anesthesia: General    Estimated Blood Loss (mL): less than 50     Complications: None    Specimens:   * No specimens in log *    Implants:  Implant Name Type Inv. Item Serial No.  Lot No. LRB No. Used Action   GRAFT BNE SUB [id number] PAT TEND GILLIAN [facility] - [id number]  GRAFT BNE SUB [id number] PAT TEND GILLIAN [facility]  MUSCULOSKELETAL TRANSPLANT [facility]- [id number] Left 1 Implanted   PIN FIX DIA2.7MM BIOCRYL FEM TIB BNE TEND BNE CRSS RIGIDFIX - [id number]  PIN FIX DIA2.7MM BIOCRYL FEM TIB BNE TEND BNE CRSS RIGIDFIX  [facility] DEPUY SYNTHES MITEK-WD [id number] Left 1 Implanted   SCREW INTFR L23MM DIA9MM BIOCRYL RAPIDE ABSRB ARI ADV - [id number]  SCREW INTFR L23MM DIA9MM BIOCRYL RAPIDE ABSRB ARI ADV  [facility] DEPUY SYNTHES MITEK-WD [id number] Left 1 Implanted         Drains: * No LDAs found *    Findings: Complete anterior cruciate ligament tear, bucket-handle lateral meniscus tear, complex tear medial meniscus. Physician assistant was needed throughout the procedure. Resident was not available. The [de-identified] assistant provided preoperative positioning, exposure and protection of soft tissues and closure of the wound and ultimate transfer to the postanesthesia care unit. Detailed Description of Procedure:   42-year of age male has had chronic instability of the left knee over the last 15 to 20 years.   He initially injured the knee in the early 2000's. He has had chronic recurrent instability. He works in construction. He was referred for evaluation because of the recurrent instability. X-ray evaluation showed some degenerative change but there was preserved joint space. MRI from June 30, 2022 of the left knee showed anterior cruciate ligament tear and complex tearing the posterior horn of the medial meniscus. Risks and benefits of nonoperative versus operative treatment reviewed. He want to proceed with operative approach given the recurrent instability. Chronic issues with degeneration were reviewed. Regarding the surgical procedure risks and benefits of different graft options were reviewed. He wanted proceed with allograft reconstruction. He was informed of the risks for allograft reconstruction to include but not limited to decreased incorporation rate, infection including bacterial infection as well as viral infection such as hepatitis and HIV. Regarding the surgical procedure was informed of the incisions and the period of recovery. He was informed of risks to include but not limited to infection, wound complications, recurrent tear, recurrent instability, need for further surgery, DVT, pulmonary embolus, neurovascular injury, complex regional pain syndrome, medical anesthetic risk. All questions were answered and consent was obtained. Patient was brought to the operating room where after induction of general and regional anesthesia his placed supine on the operating table. Exam under anesthesia demonstrated no evidence of effusion. He had full extension of the left knee full flexion. He had a grossly positive Lachman's and anterior drawer. He had positive pivot shift maneuver. He had negative posterior drawer. He had no evidence of medial, lateral or posterior lateral instability. His left lower extremities and prepped and draped in usual sterile fashion.   He was given prophylactic antibiotics preoperatively. Timeout was taken confirming surgical site, procedure, allograft, instrumentation, fire risk, and antibiotics. The allograft was removed from the freezer placed on the back table and 1000 cc of saline with 80 mg of gentamicin. The left lower extremity was exsanguinated with a sterile Esmarch. Tourniquet was inflated to 350 mmHg. 60 cc of sterile saline introduced into the knee joint. 15 blade was used to make standard anteromedial and anterolateral portals. The arthroscope inserted through the anterolateral portal into the suprapatellar pouch. Under direct vision in the lateral egress needle was placed for outflow. Arthroscopic evaluation demonstrated loose chondral tissue in the suprapatellar pouch. The patella was normally centered. There is normal tracking the patella. The notch demonstrated complete disruption of the anterior cruciate ligament. The posterior cruciate ligament was intact. There is a displaced bucket-handle tear of the lateral meniscus. This was reduced. Using arthroscopic biter and shaver partial lateral meniscectomy was performed. This was not a reparable tear as it was significantly macerated from the chronicity. This involved approximately 50 to 60% of the posterior horn and mid body. A stable meniscal rim was obtained. The medial compartment demonstrated some evidence of degenerative change. There was evidence of a complex tear of the posterior horn of the medial meniscus extending to the mid body. This involved approximately 50 to 60% of the posterior horn and mid body. Using arthroscopic biter and shaver partial medial meniscectomy was performed. A stable meniscal rim was obtained. Attention was then drawn to the notch. Debridement of the ACL stump was performed. Notchplasty was performed. The graft was prepared on the back table. It measured 10 mm in diameter. Attention of the notch was then undertaken.   Using a tibial guide set at 55 degrees this was placed just to enter the joint lateral to the medial tibial spine approximately 7 mm proximal to the to the posterior cruciate ligament and in line with the posterior border the anterior horn of the lateral meniscus. An anterior longitudinal incision was made just medial to the tibial tubercle. Ruthe Balzarine was then placed. It was confirmed to be in appropriate position. This was then overdrilled with a 10 mm conical drill. Plug was placed. Excess bony debris was removed. Next using an over-the-top guide to establish a 1 mm back wall guidepin was placed at approximately the 1:30 position. This was then overdrilled with a 10 mm acorn drill to 35 mm. Again arthroscopic debridement was performed of bony debris. Next using the Mytec rigid fix system U-shaped guide was placed. 1 cm incision was made laterally. Ruthe Balzarine was in cannulas were drilled into position. These were confirmed to be in appropriate position. Eyelet a guidepin was then passed through the tibial tunnel through the notch and through the femoral tunnel and then out the anterior aspect of the femur and thigh. The passing sutures were looped through the eyelet and then pulled through. The graft was fully seated under direct vision. 2 Mytec rigid fix pins were then drilled and placed. Distal traction on the sutures demonstrate excellent femoral fixation. Smooth wire was then placed anterior to the graft through the tibial tunnel. This was then tapped and a 9 x 25 mm Mytec interference screw was placed. The knee had been placed in 30 degrees of flexion. Arthroscopic evaluation the graft demonstrate excellent positioning the graft with appropriate tension and no evidence of impingement through full range of motion. Final arthroscopy images were taken. The knee was then copiously irrigated through inflow outflow cannulas. Arthroscopy instruments were removed.   Portals were closed with 4-0 Monocryl subcuticular suture. The graft sites were closed with 2-0 Vicryl dermal sutures and 4-0 Monocryl subcuticular suture. Bacitracin Adaptic sterile dressings were applied. All sponge needle counts were correct at the end the case. Patient was placed in a hinged postoperative knee brace with a Cryo/Cuff transfer the postanesthesia care in stable condition.     Electronically signed by Lionel Ricks MD on 9/9/2022 at 10:59 AM

## 2022-09-09 NOTE — ANESTHESIA PRE PROCEDURE
Department of Anesthesiology  Preprocedure Note       Name:  River Acuna   Age:  39 y.o.  :  1980                                          MRN:  [id number]         Date:  2022      Surgeon: Meera Query):  Bartolo Siddiqi MD    Procedure: Procedure(s):  LEFT ARTHROSCOPIC KNEE SURGERY, PARTIAL MEDIAL MENISCECTOMY, ANTERIOR CRUCIATE LIGAMENT RECONSTRUCTION. BONE TENDON BONE PATELLAR TENDON ALLOGRAFT, MYTEC RIGID FIX AND INTERFERENCE SCREWS. HOWARD    Medications prior to admission:   Prior to Admission medications    Medication Sig Start Date End Date Taking? Authorizing Provider   oxyCODONE-acetaminophen (PERCOCET) 5-325 MG per tablet Take 1 tablet by mouth every 6 hours as needed for Pain for up to 7 days. Intended supply: 7 days. Take lowest dose possible to manage pain 22 Yes Arthurine LINK Isbell   celecoxib (CELEBREX) 100 MG capsule Take 1 capsule by mouth daily 9/9/22 10/9/22 Yes Arthurine LINK Isbell   ipratropium-albuterol (DUONEB) 0.5-2.5 (3) MG/3ML SOLN nebulizer solution inhale contents of 1 vial ( 3 milliliters ) in nebulizer by mouth and INTO THE LUNGS every 4 hours if needed for wheezing 22   Serina Elizabeth PA-C   omeprazole (PRILOSEC) 40 MG delayed release capsule take 1 capsule by mouth once daily  Patient taking differently: Take 40 mg by mouth every evening take 1 capsule by mouth once daily 22   Serina Elizabeth PA-C   folic acid-pyridoxine-cyanocobalamin (FOLTABS 800) 0.8-10-0. 115 MG TABS tablet Take 1 tablet by mouth daily 22   Serina Elizabeth PA-C   vitamin D (DIALYVITE VITAMIN D 5000) 125 MCG (5000 UT) CAPS capsule Take 1 capsule by mouth daily 22   Serina Elizabeth PA-C   COMBIVENT RESPIMAT  MCG/ACT AERS inhaler inhale 1 puff by mouth and INTO THE LUNGS every 4 hours 1/10/22   Serina Elizabeth PA-C   potassium chloride (KLOR-CON M) 20 MEQ extended release tablet take 1 tablet by mouth twice a day  Patient taking differently: Indications: Takes 2 - 3 times per week. take 1 tablet by mouth twice a day 9/28/21   Ventia Garrido MD   Respiratory Therapy Supplies (NEBULIZER/TUBING/MOUTHPIECE) KIT 1 kit by Does not apply route daily as needed 11/2/15 11/4/15  Fransisco Willingham MD       Current medications:    Current Facility-Administered Medications   Medication Dose Route Frequency Provider Last Rate Last Admin    lactated ringers infusion   IntraVENous Continuous Rubbie Pass, APRN -  mL/hr at 09/09/22 0728 New Bag at 09/09/22 0728    sodium chloride flush 0.9 % injection 5-40 mL  5-40 mL IntraVENous 2 times per day Rubbie Pass, APRN - CNP        sodium chloride flush 0.9 % injection 5-40 mL  5-40 mL IntraVENous PRN Rubbie Pass, APRN - CNP        0.9 % sodium chloride infusion   IntraVENous PRN Rubbie Pass, APRN - CNP        ceFAZolin (ANCEF) 2000 mg in dextrose 5 % 100 mL IVPB  2,000 mg IntraVENous Once Rubbie Pass, APRN - CNP           Allergies:  No Known Allergies    Problem List:    Patient Active Problem List   Diagnosis Code    Gall bladder disease K82.9    History of bruising easily Z86.2    Moderate persistent asthma without complication Q84.06    Hypokalemia E87.6    Numbness and tingling in both hands R20.0, R20.2    Gastroesophageal reflux disease with esophagitis K21.00   Dorota Ruffin R07.89    Acute stress disorder F43.0    Thoracic outlet syndrome G54.0    BMI 26.0-26.9,adult Z68.26    Acute pain of right shoulder M25.511    Abnormal CBC measurement R79.89    Bilateral carpal tunnel syndrome G56.03    Blunt trauma of neck S19.80XA    Numbness and tingling of both legs R20.0, R20.2    Neck muscle spasm M62.838    Folate deficiency E53.8    Injury of right anterior cruciate ligament S89. 91XA    Tear of medial meniscus of left knee, current S83.242A    Left anterior cruciate ligament tear S83.512A    Tobacco dependence F17.200       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 07/14/2020    Moderate persistent asthma without complication 58/04/1605    PONV (postoperative nausea and vomiting)     post op choli    Tobacco dependence 09/01/2022       Past Surgical History:        Procedure Laterality Date    CHOLECYSTECTOMY  07/28/2015    lap gabrielle with grams    DENTAL SURGERY      ERCP  07/30/2015    ERCP with sphincterotomy and common bile duct sweep    ORCHIOPEXY Left 1986    left undescended testes    TESTICLE SURGERY  at 6 years    UPPER GASTROINTESTINAL ENDOSCOPY  11/11/2015    w/bx     WRIST SURGERY Right     due to dislocation       Social History:    Social History     Tobacco Use    Smoking status: Every Day     Packs/day: 0.50     Years: 22.00     Pack years: 11.00     Types: Cigarettes     Start date: [address] [facility]    Smokeless tobacco: Never   Substance Use Topics    Alcohol use: No     Alcohol/week: 0.0 standard drinks                                Ready to quit: Not Answered  Counseling given: Not Answered      Vital Signs (Current):   Vitals:    09/09/22 0711   BP: 120/84   Pulse: 65   Resp: 16   Temp: 98.3 °F (36.8 °C)   TempSrc: Temporal   SpO2: 99%   Weight: 160 lb (72.6 kg)   Height: 5' 7.5\" (1.715 m)                                              BP Readings from Last 3 Encounters:   09/09/22 120/84   09/01/22 123/66   05/24/22 98/76       NPO Status: Time of last liquid consumption: 0100                        Time of last solid consumption: 1730                        Date of last liquid consumption: 09/09/22                        Date of last solid food consumption: 09/08/22    BMI:   Wt Readings from Last 3 Encounters:   09/09/22 160 lb (72.6 kg)   09/01/22 164 lb 3.2 oz (74.5 kg)   08/12/22 176 lb (79.8 kg)     Body mass index is 24.69 kg/m².     CBC:   Lab Results   Component Value Date/Time    WBC 10.4 09/01/2022 05:49 PM    RBC 4.29 09/01/2022 05:49 PM    HGB 13.9 09/01/2022 05:49 PM    HCT 39.7 09/01/2022 05:49 PM    MCV 92.5 09/01/2022 05:49 PM    RDW 12.3 09/01/2022 05:49 PM     09/01/2022 05:49 PM       CMP:   Lab Results   Component Value Date/Time     09/01/2022 05:49 PM    K 2.8 09/01/2022 05:49 PM     09/01/2022 05:49 PM    CO2 29 09/01/2022 05:49 PM    BUN 7 09/01/2022 05:49 PM    CREATININE 1.00 09/01/2022 05:49 PM    GFRAA >60.0 09/01/2022 05:49 PM    LABGLOM >60.0 09/01/2022 05:49 PM    GLUCOSE 81 09/01/2022 05:49 PM    PROT 7.1 04/18/2022 01:25 PM    CALCIUM 9.4 09/01/2022 05:49 PM    BILITOT 0.5 04/18/2022 01:25 PM    ALKPHOS 96 04/18/2022 01:25 PM    AST 25 04/18/2022 01:25 PM    ALT 34 04/18/2022 01:25 PM       POC Tests: No results for input(s): POCGLU, POCNA, POCK, POCCL, POCBUN, POCHEMO, POCHCT in the last 72 hours. Coags:   Lab Results   Component Value Date/Time    PROTIME 10.0 10/30/2015 04:17 PM    INR 0.9 10/30/2015 04:17 PM    APTT 27.5 10/30/2015 04:17 PM       HCG (If Applicable): No results found for: PREGTESTUR, PREGSERUM, HCG, HCGQUANT     ABGs: No results found for: PHART, PO2ART, RCG8DMV, YIP1PWV, BEART, L5IJRXXM     Type & Screen (If Applicable):  No results found for: LABABO, LABRH    Drug/Infectious Status (If Applicable):  No results found for: HIV, HEPCAB    COVID-19 Screening (If Applicable): No results found for: COVID19        Anesthesia Evaluation  Patient summary reviewed and Nursing notes reviewed   history of anesthetic complications: PONV. Airway: Mallampati: II  TM distance: >3 FB   Neck ROM: full  Mouth opening: > = 3 FB   Dental: normal exam         Pulmonary:normal exam    (+) asthma:                            Cardiovascular:Negative CV ROS                      Neuro/Psych:   Negative Neuro/Psych ROS              GI/Hepatic/Renal: Neg GI/Hepatic/Renal ROS            Endo/Other: Negative Endo/Other ROS                    Abdominal:             Vascular: negative vascular ROS. Other Findings:           Anesthesia Plan      general     ASA 2       Induction: intravenous. MIPS: Prophylactic antiemetics administered. Anesthetic plan and risks discussed with patient. Plan discussed with CRNA.     Attending anesthesiologist reviewed and agrees with Preprocedure content      Post-op pain plan if not by surgeon: single peripheral nerve block            Anne Spencer MD   9/9/2022

## 2022-09-09 NOTE — DISCHARGE INSTRUCTIONS
[facility]    Orthopedic care:  Dr. Santhosh Plasencia PA-C    Diagnosis and Procedure:   ACL reconstruction    Activity:  -Full weightbearing as tolerated. Elevate the leg to help minimize swelling.    -Continue with his therapy exercises regardless of how uncomfortable it may be. Please make sure that you have an appointment with physical therapy 3 to 5 days after surgery.  -Wear your knee brace at all times when ambulating, can take off when sitting around or sleeping.  -Please ensure that you have therapy scheduled to begin within the first week after surgery. If this is not yet arranged, please call our office immediately so this can be expedited.   -Do 200 straight leg raises daily and work on bending your knee to 90 degrees until you begin therapy. Dressing Care:  -You can take your ACE wrap and bulky dressings off the 3 days after surgery. Thereafter, keep your incision covered until you see us again in the office. You can shower however do not submerge the leg in a bathtub or swimming pool. Medications:  -Celebrex 100 mg in the morning as needed for pain.  -Percocet tablet every 6 hrs if pain is not well controlled with above.  Please limit the use of this medication as it is highly addictive.  -Continue all other home medications as prescribed by other doctors    Additional Instructions:  -Use your DonJoy cooling system as you feel necessary, recommend 20 minutes every hour especially the first week after surgery      [facility]  [address] [place] [address], [address] [place] [address], [address]  [facility], [address] [place] [address]

## 2022-09-09 NOTE — ANESTHESIA PROCEDURE NOTES
Peripheral Block    Patient location during procedure: pre-op  Reason for block: post-op pain management  Start time: 9/9/2022 8:00 AM  Staffing  Performed: anesthesiologist   Anesthesiologist: Shanice Kuo MD  Preanesthetic Checklist  Completed: patient identified, IV checked, site marked, risks and benefits discussed, surgical/procedural consents, equipment checked, pre-op evaluation, timeout performed, anesthesia consent given, oxygen available, monitors applied/VS acknowledged, fire risk safety assessment completed and verbalized and blood product R/B/A discussed and consented  Peripheral Block   Patient position: supine  Prep: ChloraPrep  Provider prep: mask and sterile gloves  Patient monitoring: cardiac monitor, continuous pulse ox, frequent blood pressure checks and IV access  Block type: Saphenous  Laterality: left  Injection technique: single-shot  Guidance: ultrasound guided  Local infiltration: ropivacaine  Infiltration strength: 0.5 %  Local infiltration: ropivacaine  Dose: 30 mL    Needle   Needle type: insulated echogenic nerve stimulator needle   Needle gauge: 21 G  Needle localization: ultrasound guidance  Test dose: negative  Needle length: 10 cm  Assessment   Injection assessment: negative aspiration for heme, no paresthesia on injection, local visualized surrounding nerve on ultrasound and no intravascular symptoms  Paresthesia pain: none  Slow fractionated injection: yes  Hemodynamics: stable  Outcomes: uncomplicated

## 2022-09-09 NOTE — ANESTHESIA POSTPROCEDURE EVALUATION
Department of Anesthesiology  Postprocedure Note    Patient: Alondra Douglass  MRN: [id number]  YOB: 1980  Date of evaluation: 9/9/2022      Procedure Summary     Date: 09/09/22 Room / Location: [facility] OR [address] [place] [address]    Anesthesia Start: Nemo Lima Anesthesia Stop: 7518    Procedure: LEFT ARTHROSCOPIC KNEE SURGERY, PARTIAL MEDIAL MENISCECTOMY, ANTERIOR CRUCIATE LIGAMENT RECONSTRUCTION. BONE TENDON BONE PATELLAR TENDON ALLOGRAFT, MYTEC RIGID FIX AND INTERFERENCE SCREWS. HOWARD (Left: Knee) Diagnosis:       Tears of meniscus and anterior cruciate ligament of left knee, initial encounter      (LEFT KNEE ANTERIOR CRUCIATE LIGAMENT TEAR, MEDIAL MENISCUS TEAR)    Surgeons: Jan Ewing MD Responsible Provider: Maxim Prieto MD    Anesthesia Type: general ASA Status: 2          Anesthesia Type: No value filed.     [place]eva Phase I:      Geneva Phase II:        Anesthesia Post Evaluation    Patient location during evaluation: bedside  Patient participation: complete - patient participated  Level of consciousness: awake and awake and alert  Airway patency: patent  Nausea & Vomiting: no nausea and no vomiting  Complications: no  Cardiovascular status: blood pressure returned to baseline and hemodynamically stable  Respiratory status: acceptable  Hydration status: euvolemic

## 2022-09-09 NOTE — PROGRESS NOTES
Kidney function is Chronic  Kidney Disease Stage 4      Medication change:   · Stop all multivitamins  · Stop Magnesium     Labs today -- renal panel, magnesium level    Nurse visit next week for blood pressure calibration (scheduled Tuesday October 19th at 2:00 pm). Please bring home blood pressure machine in.     Check your blood pressure twice daily at home and keep a log (once 30 minutes after taking your medications in the morning and once 30 minutes before bedtime). Call readings into the office in 1 week.      Referral to Vascular Surgery, Dr. Tiwari, for fistula placement. You will receive a call from [facility] to schedule.     Referral to Transplant, Dr. Olivas, for possible kidney transplant. You will receive a call from [facility] to schedule.         Follow up in 6 weeks, (scheduled Monday November 29th at 1:00 pm), with non fasting lab work one week prior to the appointment. We will go over results at that appointment. A set of labs will be drawn prior to appointment (scheduled Monday November 22nd at 10:15 am)--Renal panel, Magnesium level, CBC with differential, Urinalysis, UPCR, Microalbumin urine, iPTH, Vitamin D -25 Hydroxy      Remember to maintain fluid intake of about 60-70 ounces daily, attempt to stay away from salt/sodium, and attempt to stay away from Ibuprofen, Aleve, and Advil. Tylenol/Acetaminophen based products are best.    If any issues or questions should happen before your next appointment, do not hesitate to call. Our office number is [phone].     Pt's girlfriend [facility] here for transport, went over dc instructions and the Presley with her, verbalized understanding, iv dc'd, pt getting dressed with assistance of girlfriend

## 2022-09-09 NOTE — PROGRESS NOTES
Dc instructions given to pt, called girlfriencarolyne Kraus to come in for further instructions for dc

## 2022-09-12 DIAGNOSIS — E87.6 HYPOKALEMIA: ICD-10-CM

## 2022-09-12 RX ORDER — POTASSIUM CHLORIDE 20 MEQ/1
TABLET, EXTENDED RELEASE ORAL
Qty: 60 TABLET | Refills: 2 | Status: SHIPPED | OUTPATIENT
Start: 2022-09-12

## 2022-09-13 DIAGNOSIS — S83.512D RUPTURE OF ANTERIOR CRUCIATE LIGAMENT OF LEFT KNEE, SUBSEQUENT ENCOUNTER: ICD-10-CM

## 2022-09-13 DIAGNOSIS — S89.91XA INJURY OF RIGHT ANTERIOR CRUCIATE LIGAMENT: Primary | ICD-10-CM

## 2022-09-13 DIAGNOSIS — S83.242D TEAR OF MEDIAL MENISCUS OF LEFT KNEE, CURRENT, UNSPECIFIED TEAR TYPE, SUBSEQUENT ENCOUNTER: ICD-10-CM

## 2022-09-22 ENCOUNTER — PATIENT MESSAGE (OUTPATIENT)
Dept: ORTHOPEDIC SURGERY | Age: 42
End: 2022-09-22

## 2022-09-22 DIAGNOSIS — S89.91XA INJURY OF RIGHT ANTERIOR CRUCIATE LIGAMENT: Primary | ICD-10-CM

## 2022-09-26 ENCOUNTER — OFFICE VISIT (OUTPATIENT)
Dept: ORTHOPEDIC SURGERY | Age: 42
End: 2022-09-26

## 2022-09-26 VITALS — WEIGHT: 160 LBS | OXYGEN SATURATION: 97 % | BODY MASS INDEX: 24.69 KG/M2 | HEART RATE: 77 BPM | TEMPERATURE: 98.7 F

## 2022-09-26 DIAGNOSIS — S83.242A TEAR OF MEDIAL MENISCUS OF LEFT KNEE, CURRENT, UNSPECIFIED TEAR TYPE, INITIAL ENCOUNTER: ICD-10-CM

## 2022-09-26 DIAGNOSIS — S83.512A RUPTURE OF ANTERIOR CRUCIATE LIGAMENT OF LEFT KNEE, INITIAL ENCOUNTER: Primary | ICD-10-CM

## 2022-09-26 PROCEDURE — 99024 POSTOP FOLLOW-UP VISIT: CPT | Performed by: PHYSICIAN ASSISTANT

## 2022-09-26 NOTE — PROGRESS NOTES
[facility]      Subjective:      Chief Complaint   Patient presents with    Post-Op Check     LEFT ARTHROSCOPIC KNEE SURGERY, PARTIAL MEDIAL MENISCECTOMY  Patient says left knee is pain. Pt rates pain 8/10       HPI: Do Waggoner is a 39 y.o. male who is here 2 weeks after ACL reconstruction and meniscectomy by Dr. Felipe Trent. He just recently initiate therapy. No complaints in terms of pain, has been tapering his narcotics. He has been wearing a bandage underneath his knee brace. No significant laxity. He has good flexibility at that knee. 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 finger 07/14/2020    Moderate persistent asthma without complication 56/78/1322    PONV (postoperative nausea and vomiting)     post op choli    Tobacco dependence 09/01/2022      Past Surgical History:   Procedure Laterality Date    CHOLECYSTECTOMY  07/28/2015    lap gabrielle with grams    DENTAL SURGERY      ERCP  07/30/2015    ERCP with sphincterotomy and common bile duct sweep    KNEE SURGERY Left 9/9/2022    LEFT ARTHROSCOPIC KNEE SURGERY, PARTIAL MEDIAL MENISCECTOMY, ANTERIOR CRUCIATE LIGAMENT RECONSTRUCTION. BONE TENDON BONE PATELLAR TENDON ALLOGRAFT, MYTEC RIGID FIX AND INTERFERENCE SCREWS.  HOWARD performed by Jordi Verdugo MD at [facility]    ORCHIOPEXY Left 1986    left undescended testes    TESTICLE SURGERY  at 6 years    UPPER GASTROINTESTINAL ENDOSCOPY  11/11/2015    w/bx     WRIST SURGERY Right     due to dislocation     Social History     Socioeconomic History    Marital status: Single     Spouse name: Not on file    Number of children: Not on file    Years of education: Not on file    Highest education level: Not on file   Occupational History    Not on file   Tobacco Use    Smoking status: Every Day     Packs/day: 0.50     Years: 22.00     Pack years: 11.00     Types: Cigarettes     Start date: 200    Smokeless tobacco: Never   Vaping Use    Vaping Use: Never used   Substance and Sexual Activity    Alcohol use: No     Alcohol/week: 0.0 standard drinks    Drug use: Yes     Types: Marijuana Valdene Resendez)     Comment: daily    Sexual activity: Yes   Other Topics Concern    Not on file   Social History Narrative    Not on file     Social Determinants of Health     Financial Resource Strain: Low Risk     Difficulty of Paying Living Expenses: Not hard at all   Food Insecurity: No Food Insecurity    Worried About Running Out of Food in the Last Year: Never true    Ran Out of Food in the Last Year: Never true   Transportation Needs: Not on file   Physical Activity: Inactive    Days of Exercise per Week: 0 days    Minutes of Exercise per Session: 0 min   Stress: Not on file   Social Connections: Not on file   Intimate Partner Violence: Not At Risk    Fear of Current or Ex-Partner: No    Emotionally Abused: No    Physically Abused: No    Sexually Abused: No   Housing Stability: Not on file     Family History   Problem Relation Age of Onset    Heart Disease Mother     Coronary Art Dis Mother     Asthma Mother     COPD Mother     Cancer Father         lung cancer    Lung Cancer Father     No Known Problems Brother     Substance Abuse Brother     Heart Attack Brother     Cancer Paternal Uncle         Lung     No Known Allergies  Current Outpatient Medications on File Prior to Visit   Medication Sig Dispense Refill    potassium chloride (KLOR-CON M) 20 MEQ extended release tablet Indications: Takes 2 - 3 times per week.  take 1 tablet by mouth twice a day 60 tablet 2    celecoxib (CELEBREX) 100 MG capsule Take 1 capsule by mouth daily 30 capsule 1    ipratropium-albuterol (DUONEB) 0.5-2.5 (3) MG/3ML SOLN nebulizer solution inhale contents of 1 vial ( 3 milliliters ) in nebulizer by mouth and INTO THE LUNGS every 4 hours if needed for wheezing 360 mL 1    omeprazole (PRILOSEC) 40 MG delayed release capsule take 1 capsule by mouth once daily (Patient taking differently: Take 40 mg by mouth every evening take 1 capsule by mouth once daily) 30 capsule 2    folic acid-pyridoxine-cyanocobalamin (FOLTABS 800) 0.8-10-0. 115 MG TABS tablet Take 1 tablet by mouth daily 90 tablet 4    vitamin D (DIALYVITE VITAMIN D 5000) 125 MCG (5000 UT) CAPS capsule Take 1 capsule by mouth daily 90 capsule 4    COMBIVENT RESPIMAT  MCG/ACT AERS inhaler inhale 1 puff by mouth and INTO THE LUNGS every 4 hours 8 g 5    [DISCONTINUED] Respiratory Therapy Supplies (NEBULIZER/TUBING/MOUTHPIECE) KIT 1 kit by Does not apply route daily as needed 1 kit 0     No current facility-administered medications on file prior to visit. Objective:   Pulse 77   Temp 98.7 °F (37.1 °C) (Temporal)   Wt 160 lb (72.6 kg)   SpO2 97%   BMI 24.69 kg/m²       Radiographs and Laboratory Studies:   Previous diagnostic imaging studies were reviewed. Laboratory Studies:   Lab Results   Component Value Date    WBC 10.4 09/01/2022    HGB 13.9 (L) 09/01/2022    HCT 39.7 (L) 09/01/2022    MCV 92.5 09/01/2022     09/01/2022     No results found for: SEDRATE  No results found for: CRP    Assessment and Plan:      Diagnosis Orders   1. Rupture of anterior cruciate ligament of left knee, initial encounter        2. Tear of medial meniscus of left knee, current, unspecified tear type, initial encounter            2 weeks postop ACL:   -incision check: looks good. He is already bending his knee to about 150 degrees. -ACL brace at all times when active  -follow ACL protocol sheet, continue working with therapy   -see back in 4 weeks . no running, cutting, jumping or heavy squatting  -Refill narcotics 1 last time, I will not refill this prescription again  -Back in 6 to 8 weeks for repeat evaluation. Hopefully we get him back to work as a  in about 4 to 5 months.   We will update x-rays at that time           The above plan was discussed in thorough detail with Dr. Carmen Salomon and the patient. No orders of the defined types were placed in this encounter. No orders of the defined types were placed in this encounter. No follow-ups on file.     Halie Mendoza PA-C  [place] [address] [facility]  [phone]

## 2022-09-27 RX ORDER — OXYCODONE HYDROCHLORIDE AND ACETAMINOPHEN 5; 325 MG/1; MG/1
1 TABLET ORAL EVERY 6 HOURS PRN
Qty: 20 TABLET | Refills: 0 | Status: SHIPPED | OUTPATIENT
Start: 2022-09-27 | End: 2022-10-02

## 2022-09-27 NOTE — TELEPHONE ENCOUNTER
From: Raoul Schlatter  To: Dr. Eden Angel  Sent: 9/22/2022 12:40 PM EDT  Subject: Referral Physical therapy (ACL) referral      Physical therapy for reconstruction  [address] [facility] [address] [place] [address] Fax [phone] thank you

## 2022-10-01 DIAGNOSIS — K21.00 GASTROESOPHAGEAL REFLUX DISEASE WITH ESOPHAGITIS: ICD-10-CM

## 2022-10-01 NOTE — TELEPHONE ENCOUNTER
pharmacy requesting medication refill.  Please approve or deny this request.    Rx requested:  Requested Prescriptions     Pending Prescriptions Disp Refills    omeprazole (PRILOSEC) 40 MG delayed release capsule [Pharmacy Med Name: OMEPRAZOLE DR 40 MG CAPSULE] 30 capsule 2     Sig: take 1 capsule by mouth once daily    ipratropium-albuterol (DUONEB) 0.5-2.5 (3) MG/3ML SOLN nebulizer solution [Pharmacy Med Name: IPRAT-ALBUT 0.5-3(2.5) MG/3 ML] 360 mL 1     Sig: inhale contents of 1 vial ( 3 milliliters ) in nebulizer by mouth and INTO THE LUNGS every 4 hours if needed for wheezing         Last Office Visit:   5/24/2022      Next Visit Date:  Future Appointments   Date Time Provider Laura Armas   10/6/2022  3:00 PM Hamlet Ugarte PA-C [facility]   11/7/2022  1:45 PM Yaz Kelly PA-C [address] [facility]

## 2022-10-03 RX ORDER — OMEPRAZOLE 40 MG/1
CAPSULE, DELAYED RELEASE ORAL
Qty: 30 CAPSULE | Refills: 2 | Status: SHIPPED | OUTPATIENT
Start: 2022-10-03

## 2022-10-03 RX ORDER — IPRATROPIUM BROMIDE AND ALBUTEROL SULFATE 2.5; .5 MG/3ML; MG/3ML
SOLUTION RESPIRATORY (INHALATION)
Qty: 360 ML | Refills: 1 | Status: SHIPPED | OUTPATIENT
Start: 2022-10-03

## 2022-10-06 ENCOUNTER — OFFICE VISIT (OUTPATIENT)
Dept: FAMILY MEDICINE CLINIC | Age: 42
End: 2022-10-06
Payer: COMMERCIAL

## 2022-10-06 VITALS
RESPIRATION RATE: 16 BRPM | DIASTOLIC BLOOD PRESSURE: 80 MMHG | OXYGEN SATURATION: 98 % | SYSTOLIC BLOOD PRESSURE: 130 MMHG | HEART RATE: 66 BPM | BODY MASS INDEX: 25.91 KG/M2 | WEIGHT: 171 LBS | HEIGHT: 68 IN

## 2022-10-06 DIAGNOSIS — K44.9 HIATAL HERNIA: ICD-10-CM

## 2022-10-06 DIAGNOSIS — E53.8 FOLATE DEFICIENCY: ICD-10-CM

## 2022-10-06 DIAGNOSIS — M62.838 NECK MUSCLE SPASM: ICD-10-CM

## 2022-10-06 DIAGNOSIS — E87.6 HYPOKALEMIA: ICD-10-CM

## 2022-10-06 DIAGNOSIS — G89.29 CHRONIC NECK PAIN: Primary | ICD-10-CM

## 2022-10-06 DIAGNOSIS — R79.89 ABNORMAL CBC MEASUREMENT: ICD-10-CM

## 2022-10-06 DIAGNOSIS — M54.2 CHRONIC NECK PAIN: Primary | ICD-10-CM

## 2022-10-06 DIAGNOSIS — M54.12 RADICULOPATHY, CERVICAL: ICD-10-CM

## 2022-10-06 DIAGNOSIS — Z98.890 S/P ACL REPAIR: ICD-10-CM

## 2022-10-06 LAB
ANION GAP SERPL CALCULATED.3IONS-SCNC: 12 MEQ/L (ref 9–15)
BASOPHILS ABSOLUTE: 0.1 K/UL (ref 0–0.2)
BASOPHILS RELATIVE PERCENT: 0.8 %
BUN BLDV-MCNC: 5 MG/DL (ref 6–20)
CALCIUM SERPL-MCNC: 9.6 MG/DL (ref 8.5–9.9)
CHLORIDE BLD-SCNC: 105 MEQ/L (ref 95–107)
CO2: 27 MEQ/L (ref 20–31)
CREAT SERPL-MCNC: 0.95 MG/DL (ref 0.7–1.2)
EOSINOPHILS ABSOLUTE: 0.2 K/UL (ref 0–0.7)
EOSINOPHILS RELATIVE PERCENT: 3.3 %
GFR AFRICAN AMERICAN: >60
GFR NON-AFRICAN AMERICAN: >60
GLUCOSE BLD-MCNC: 85 MG/DL (ref 70–99)
HCT VFR BLD CALC: 38.7 % (ref 42–52)
HEMOGLOBIN: 13.7 G/DL (ref 14–18)
LYMPHOCYTES ABSOLUTE: 2.4 K/UL (ref 1–4.8)
LYMPHOCYTES RELATIVE PERCENT: 33.9 %
MCH RBC QN AUTO: 33 PG (ref 27–31.3)
MCHC RBC AUTO-ENTMCNC: 35.3 % (ref 33–37)
MCV RBC AUTO: 93.6 FL (ref 80–100)
MONOCYTES ABSOLUTE: 0.5 K/UL (ref 0.2–0.8)
MONOCYTES RELATIVE PERCENT: 7 %
NEUTROPHILS ABSOLUTE: 3.9 K/UL (ref 1.4–6.5)
NEUTROPHILS RELATIVE PERCENT: 55 %
PDW BLD-RTO: 12.7 % (ref 11.5–14.5)
PLATELET # BLD: 300 K/UL (ref 130–400)
POTASSIUM SERPL-SCNC: 3.7 MEQ/L (ref 3.4–4.9)
RBC # BLD: 4.14 M/UL (ref 4.7–6.1)
SODIUM BLD-SCNC: 144 MEQ/L (ref 135–144)
WBC # BLD: 7 K/UL (ref 4.8–10.8)

## 2022-10-06 PROCEDURE — 99214 OFFICE O/P EST MOD 30 MIN: CPT | Performed by: PHYSICIAN ASSISTANT

## 2022-10-06 PROCEDURE — 4004F PT TOBACCO SCREEN RCVD TLK: CPT | Performed by: PHYSICIAN ASSISTANT

## 2022-10-06 PROCEDURE — G8419 CALC BMI OUT NRM PARAM NOF/U: HCPCS | Performed by: PHYSICIAN ASSISTANT

## 2022-10-06 PROCEDURE — G8427 DOCREV CUR MEDS BY ELIG CLIN: HCPCS | Performed by: PHYSICIAN ASSISTANT

## 2022-10-06 PROCEDURE — G8484 FLU IMMUNIZE NO ADMIN: HCPCS | Performed by: PHYSICIAN ASSISTANT

## 2022-10-06 RX ORDER — LIDOCAINE 5% 5 G/100G
CREAM TOPICAL
Qty: 30 G | Refills: 11 | Status: SHIPPED | OUTPATIENT
Start: 2022-10-06

## 2022-10-06 RX ORDER — TIZANIDINE 4 MG/1
4 TABLET ORAL NIGHTLY PRN
Qty: 30 TABLET | Refills: 2 | Status: SHIPPED | OUTPATIENT
Start: 2022-10-06

## 2022-10-06 NOTE — PROGRESS NOTES
Subjective  Viki Doss, 39 y.o. male presents today with:  Chief Complaint   Patient presents with    Follow-up     Follow up on chronic pain states his left arm has been in pain for 4 days npo fall or injury        HPI  In the office today for follow up. Last OV with me: 5/24/22. Chronic L knee pain. Had evaluation at last OV for chronic knee pain from distal injury. Was referred to [facility] for evaluation. Was seen, MRI ordered. MRI abnormal--rupture ACL and meniscus tear. He was seen by Dr. Viktoria Espinal; had surgery of L knee on 9/9/22. Had post-op visit with Yaz Kelly on 9/26/22. Wearing brace. Doing exercises at home. Limiting/no narcotic use. Continues to have a lot of numbness/tingling of BUEs. Neck muscle spasm, chronic pain. Will keep him up at night. MRI of cervical spine note completed. Needing repeat labs for monitoring. Taking medications as prescribed. Review of Systems   Constitutional:  Positive for activity change. Negative for appetite change, chills, diaphoresis, fatigue, fever and unexpected weight change. Eyes:  Negative for visual disturbance. Respiratory:  Negative for chest tightness, shortness of breath and wheezing. Cardiovascular:  Negative for chest pain, palpitations and leg swelling. Musculoskeletal:  Positive for arthralgias, back pain, gait problem (post-op, improving), myalgias, neck pain and neck stiffness. Negative for joint swelling. Skin:  Negative for color change, rash and wound. Neurological:  Positive for weakness (RUE) and numbness (bilateral hands, R>>L). Negative for dizziness, tremors, seizures and light-headedness. Psychiatric/Behavioral:  Negative for dysphoric mood and sleep disturbance. The patient is not nervous/anxious.       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 finger 07/14/2020    Moderate persistent asthma without complication 88/36/0457    PONV (postoperative nausea and vomiting)     post op choli    Tobacco dependence 09/01/2022     Past Surgical History:   Procedure Laterality Date    CHOLECYSTECTOMY  07/28/2015    lap gabrielle with grams    DENTAL SURGERY      ERCP  07/30/2015    ERCP with sphincterotomy and common bile duct sweep    KNEE SURGERY Left 9/9/2022    LEFT ARTHROSCOPIC KNEE SURGERY, PARTIAL MEDIAL MENISCECTOMY, ANTERIOR CRUCIATE LIGAMENT RECONSTRUCTION. BONE TENDON BONE PATELLAR TENDON ALLOGRAFT, MYTEC RIGID FIX AND INTERFERENCE SCREWS.  HOWARD performed by Trinity Mcconnell MD at [facility]    ORCHIOPEXY Left 1986    left undescended testes    TESTICLE SURGERY  at 6 years    UPPER GASTROINTESTINAL ENDOSCOPY  11/11/2015    w/bx     WRIST SURGERY Right     due to dislocation     Social History     Socioeconomic History    Marital status: Single     Spouse name: Not on file    Number of children: Not on file    Years of education: Not on file    Highest education level: Not on file   Occupational History    Not on file   Tobacco Use    Smoking status: Every Day     Packs/day: 0.50     Years: 22.00     Pack years: 11.00     Types: Cigarettes     Start date: 200    Smokeless tobacco: Never   Vaping Use    Vaping Use: Never used   Substance and Sexual Activity    Alcohol use: No     Alcohol/week: 0.0 standard drinks    Drug use: Yes     Types: Marijuana Brothers Fogo)     Comment: daily    Sexual activity: Yes   Other Topics Concern    Not on file   Social History Narrative    Not on file     Social Determinants of Health     Financial Resource Strain: Low Risk     Difficulty of Paying Living Expenses: Not hard at all   Food Insecurity: No Food Insecurity    Worried About Running Out of Food in the Last Year: Never true    Ran Out of Food in the Last Year: Never true   Transportation Needs: Not on file   Physical Activity: Inactive    Days of Exercise per Week: 0 days    Minutes of Exercise per Session: 0 updated. Health Maintenance reviewed. Objective  Vitals:    10/06/22 1456   BP: 130/80   Site: Right Upper Arm   Position: Sitting   Cuff Size: Medium Adult   Pulse: 66   Resp: 16   SpO2: 98%   Weight: 171 lb (77.6 kg)   Height: 5' 7.5\" (1.715 m)     BP Readings from Last 3 Encounters:   10/06/22 130/80   09/09/22 124/71   09/01/22 123/66     Wt Readings from Last 3 Encounters:   10/06/22 171 lb (77.6 kg)   09/26/22 160 lb (72.6 kg)   09/09/22 160 lb (72.6 kg)     Physical Exam  Vitals reviewed. Constitutional:       Appearance: Normal appearance. He is normal weight. HENT:      Head: Normocephalic and atraumatic. Right Ear: External ear normal.      Left Ear: External ear normal.      Nose: Nose normal.      Mouth/Throat:      Mouth: Mucous membranes are moist.   Eyes:      Conjunctiva/sclera: Conjunctivae normal.   Cardiovascular:      Rate and Rhythm: Normal rate and regular rhythm. Pulmonary:      Effort: Pulmonary effort is normal.      Breath sounds: Normal breath sounds. Musculoskeletal:         General: Tenderness (with spasm of posterior neck with palpation) present. No swelling, deformity or signs of injury. Right shoulder: Tenderness present. No swelling, effusion, bony tenderness or crepitus. Normal range of motion. Normal strength. Normal pulse. Left shoulder: Tenderness present. Arms:       Right lower leg: No edema. Left lower leg: No edema. Comments: Wearing L knee brace. Skin:     General: Skin is warm. Neurological:      General: No focal deficit present. Mental Status: He is alert and oriented to person, place, and time. Epifanio Myrick was seen today for follow-up. Diagnoses and all orders for this visit:    Chronic neck pain  -     MRI CERVICAL SPINE WO CONTRAST; Future    Hiatal hernia    Radiculopathy, cervical  -     MRI CERVICAL SPINE WO CONTRAST; Future    Neck muscle spasm  -     tiZANidine (ZANAFLEX) 4 MG tablet;  Take 1 tablet by mouth nightly as needed (neck spasm)    Folate deficiency  -     Vitamin B12 & Folate; Future    Hypokalemia  -     Basic Metabolic Panel; Future    Abnormal CBC measurement  -     CBC with Auto Differential; Future    S/P ACL repair    Other orders  -     Lidocaine 5 % CREA; Apply to sore muscles/back 1-2 times daily. Doing well post-op. Wearing knee brace, pain controlled of L knee. Start evaluation of C-spine, MRI due to ongoing problems with tingling, weakness. Has post-op 11/2022 with ortho. Contact office with any questions or concerns. Orders Placed This Encounter   Procedures    MRI CERVICAL SPINE WO CONTRAST     Standing Status:   Future     Standing Expiration Date:   10/6/2023     Order Specific Question:   Reason for exam:     Answer:   chronic neck pain with spasm, radiculopathy     Order Specific Question:   What is the sedation requirement? Answer:   None    Basic Metabolic Panel     Standing Status:   Future     Number of Occurrences:   1     Standing Expiration Date:   10/6/2023    CBC with Auto Differential     Standing Status:   Future     Number of Occurrences:   1     Standing Expiration Date:   10/6/2023    Vitamin B12 & Folate     Standing Status:   Future     Number of Occurrences:   1     Standing Expiration Date:   10/6/2023     Orders Placed This Encounter   Medications    tiZANidine (ZANAFLEX) 4 MG tablet     Sig: Take 1 tablet by mouth nightly as needed (neck spasm)     Dispense:  30 tablet     Refill:  2    Lidocaine 5 % CREA     Sig: Apply to sore muscles/back 1-2 times daily. Dispense:  30 g     Refill:  11     There are no discontinued medications. No follow-ups on file. Reviewed with the patient: current clinical status, medications, activities and diet.      Side effects, adverse effects of the medication prescribed today, as well as treatment plan/ rationale and result expectations have been discussed with the patient who expresses understanding and desires to proceed. Close follow up to evaluate treatment results and for coordination of care. I have reviewed the patient's medical history in detail and updated the computerized patient record.     Serina Elizabeth PA-C

## 2022-10-07 LAB
FOLATE: 2.2 NG/ML
VITAMIN B-12: 583 PG/ML (ref 232–1245)

## 2022-10-08 DIAGNOSIS — E53.8 FOLATE DEFICIENCY: Primary | ICD-10-CM

## 2022-10-08 RX ORDER — VITAMIN B COMPLEX/FOLIC ACID 0.4 MG
1 TABLET ORAL DAILY
Qty: 90 TABLET | Refills: 4 | Status: SHIPPED | OUTPATIENT
Start: 2022-10-08

## 2022-10-12 PROBLEM — M54.12 RADICULOPATHY, CERVICAL: Status: ACTIVE | Noted: 2022-10-12

## 2022-10-12 PROBLEM — M54.2 CHRONIC NECK PAIN: Status: ACTIVE | Noted: 2022-10-12

## 2022-10-12 PROBLEM — S83.512A LEFT ANTERIOR CRUCIATE LIGAMENT TEAR: Status: RESOLVED | Noted: 2022-08-12 | Resolved: 2022-10-12

## 2022-10-12 PROBLEM — Z98.890 S/P ACL REPAIR: Status: ACTIVE | Noted: 2022-07-07

## 2022-10-12 PROBLEM — S83.232A COMPLEX TEAR OF MEDIAL MENISCUS OF LEFT KNEE AS CURRENT INJURY: Status: ACTIVE | Noted: 2022-07-07

## 2022-10-12 PROBLEM — S19.80XA BLUNT TRAUMA OF NECK: Status: RESOLVED | Noted: 2022-05-04 | Resolved: 2022-10-12

## 2022-10-12 PROBLEM — G89.29 CHRONIC NECK PAIN: Status: ACTIVE | Noted: 2022-10-12

## 2022-10-12 ASSESSMENT — ENCOUNTER SYMPTOMS
COLOR CHANGE: 0
SHORTNESS OF BREATH: 0
CHEST TIGHTNESS: 0
WHEEZING: 0
BACK PAIN: 1

## 2022-11-07 ENCOUNTER — TELEPHONE (OUTPATIENT)
Dept: FAMILY MEDICINE CLINIC | Age: 42
End: 2022-11-07

## 2022-11-08 RX ORDER — CELECOXIB 100 MG/1
CAPSULE ORAL
Qty: 30 CAPSULE | Refills: 1 | Status: SHIPPED | OUTPATIENT
Start: 2022-11-08

## 2022-11-17 ENCOUNTER — OFFICE VISIT (OUTPATIENT)
Dept: ORTHOPEDIC SURGERY | Age: 42
End: 2022-11-17

## 2022-11-17 DIAGNOSIS — S89.91XA INJURY OF RIGHT ANTERIOR CRUCIATE LIGAMENT: Primary | ICD-10-CM

## 2022-11-17 DIAGNOSIS — E53.8 FOLATE DEFICIENCY: ICD-10-CM

## 2022-11-17 DIAGNOSIS — R79.89 ABNORMAL CBC MEASUREMENT: ICD-10-CM

## 2022-11-17 PROCEDURE — 99024 POSTOP FOLLOW-UP VISIT: CPT | Performed by: PHYSICIAN ASSISTANT

## 2022-11-17 RX ORDER — LIDOCAINE 50 MG/G
OINTMENT TOPICAL
COMMUNITY
Start: 2022-11-08

## 2022-11-17 RX ORDER — VITAMIN B COMPLEX
CAPSULE ORAL
COMMUNITY
Start: 2022-10-09

## 2022-11-17 RX ORDER — MELOXICAM 15 MG/1
15 TABLET ORAL DAILY
Qty: 30 TABLET | Refills: 3 | Status: SHIPPED | OUTPATIENT
Start: 2022-11-17

## 2022-11-17 NOTE — PROGRESS NOTES
[facility]      Subjective:      Chief Complaint   Patient presents with    Follow-up     6 week follow up for Rupture of anterior cruciate ligament of left knee       HPI: Josefa Adams is a 39 y.o. male who is close to 2 months after ACL reconstruction. No significant pain. He has restored motion. He is working on some weakness and going up stairs. Having some anterior knee pain. Feels like it is buckling at times. 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 finger 07/14/2020    Moderate persistent asthma without complication 19/24/1002    PONV (postoperative nausea and vomiting)     post op choli    Tobacco dependence 09/01/2022      Past Surgical History:   Procedure Laterality Date    CHOLECYSTECTOMY  07/28/2015    lap gabrielle with grams    DENTAL SURGERY      ERCP  07/30/2015    ERCP with sphincterotomy and common bile duct sweep    KNEE SURGERY Left 9/9/2022    LEFT ARTHROSCOPIC KNEE SURGERY, PARTIAL MEDIAL MENISCECTOMY, ANTERIOR CRUCIATE LIGAMENT RECONSTRUCTION. BONE TENDON BONE PATELLAR TENDON ALLOGRAFT, MYTEC RIGID FIX AND INTERFERENCE SCREWS.  HOWARD performed by Rell Ivory MD at [facility]    ORCHIOPEXY Left 1986    left undescended testes    TESTICLE SURGERY  at 6 years    UPPER GASTROINTESTINAL ENDOSCOPY  11/11/2015    w/bx     WRIST SURGERY Right     due to dislocation     Social History     Socioeconomic History    Marital status: Single     Spouse name: Not on file    Number of children: Not on file    Years of education: Not on file    Highest education level: Not on file   Occupational History    Not on file   Tobacco Use    Smoking status: Every Day     Packs/day: 0.50     Years: 22.00     Pack years: 11.00     Types: Cigarettes     Start date: 200    Smokeless tobacco: Never   Vaping Use    Vaping Use: Never used   Substance and Sexual Activity    Alcohol use: No     Alcohol/week: 0.0 standard drinks    Drug use: Yes     Types: Marijuana Hebertell Goldberg)     Comment: daily    Sexual activity: Yes   Other Topics Concern    Not on file   Social History Narrative    Not on file     Social Determinants of Health     Financial Resource Strain: Low Risk     Difficulty of Paying Living Expenses: Not hard at all   Food Insecurity: No Food Insecurity    Worried About Running Out of Food in the Last Year: Never true    Ran Out of Food in the Last Year: Never true   Transportation Needs: Not on file   Physical Activity: Inactive    Days of Exercise per Week: 0 days    Minutes of Exercise per Session: 0 min   Stress: Not on file   Social Connections: Not on file   Intimate Partner Violence: Not At Risk    Fear of Current or Ex-Partner: No    Emotionally Abused: No    Physically Abused: No    Sexually Abused: No   Housing Stability: Not on file     Family History   Problem Relation Age of Onset    Heart Disease Mother     Coronary Art Dis Mother     Asthma Mother     COPD Mother     Cancer Father         lung cancer    Lung Cancer Father     No Known Problems Brother     Substance Abuse Brother     Heart Attack Brother     Cancer Paternal Uncle         Lung     No Known Allergies  Current Outpatient Medications on File Prior to Visit   Medication Sig Dispense Refill    celecoxib (CELEBREX) 100 MG capsule take 1 capsule by mouth once daily 30 capsule 1    vitamin B complex (NATURES BLEND B COMPLEX) TABS tablet Take 1 tablet by mouth daily 90 tablet 4    tiZANidine (ZANAFLEX) 4 MG tablet Take 1 tablet by mouth nightly as needed (neck spasm) 30 tablet 2    Lidocaine 5 % CREA Apply to sore muscles/back 1-2 times daily.  30 g 11    omeprazole (PRILOSEC) 40 MG delayed release capsule take 1 capsule by mouth once daily 30 capsule 2    ipratropium-albuterol (DUONEB) 0.5-2.5 (3) MG/3ML SOLN nebulizer solution inhale contents of 1 vial ( 3 milliliters ) in nebulizer by mouth and INTO THE LUNGS every 4 hours if needed for wheezing 360 mL 1    potassium chloride (KLOR-CON M) 20 MEQ extended release tablet Indications: Takes 2 - 3 times per week. take 1 tablet by mouth twice a day 60 tablet 2    folic acid-pyridoxine-cyanocobalamin (FOLTABS 800) 0.8-10-0. 115 MG TABS tablet Take 1 tablet by mouth daily 90 tablet 4    vitamin D (DIALYVITE VITAMIN D 5000) 125 MCG (5000 UT) CAPS capsule Take 1 capsule by mouth daily 90 capsule 4    COMBIVENT RESPIMAT  MCG/ACT AERS inhaler inhale 1 puff by mouth and INTO THE LUNGS every 4 hours 8 g 5    lidocaine (XYLOCAINE) 5 % ointment apply to SORE MUSCLES/BACK 1 TO 2 TIMES DAILY      B Complex CAPS TAKE ONE TABLET DAILY      [DISCONTINUED] Respiratory Therapy Supplies (NEBULIZER/TUBING/MOUTHPIECE) KIT 1 kit by Does not apply route daily as needed 1 kit 0     No current facility-administered medications on file prior to visit. Objective: There were no vitals taken for this visit. Radiographs and Laboratory Studies:   Previous diagnostic imaging studies were reviewed. Exam of the left knee shows well-healing incisions. He has normal range of motion with flexion extension compared to contralateral side. He has 5 out of 5 strength with quad strength. He does have some quad atrophy compared to the contralateral side however. He has hamstrings have 4-5 strength per the contralateral side. There is good stability with Lachman. Laboratory Studies:   Lab Results   Component Value Date    WBC 7.0 10/06/2022    HGB 13.7 (L) 10/06/2022    HCT 38.7 (L) 10/06/2022    MCV 93.6 10/06/2022     10/06/2022     No results found for: SEDRATE  No results found for: CRP    Assessment and Plan:      Diagnosis Orders   1. Injury of right anterior cruciate ligament            12 weeks postop ACL:  Doing some anterior knee pain as well as occasional buckling. We need to continue with the strength training.   We will also send him anti-inflammatories help with his pain. He is instructed to continue with icing. This postoperative pain is normal at 10 weeks after the surgery.  -can begin to light jog initially with therapy supervision only, can progress from there. Needs to be flat ground / indoor track initially and must be with brace on  -continue progressing with therapy  -continue wearing brace when active   -again, no running, cutting, jumping or heavy squatting especially on uneven pavement   -see back in 3 months            The above plan was discussed in thorough detail with Dr. Soheila Nash and the patient. No orders of the defined types were placed in this encounter. No orders of the defined types were placed in this encounter. No follow-ups on file.     Louana Brittle, PA-C  [facility]  [phone]

## 2022-11-21 RX ORDER — CYANOCOBALAMIN/FOLIC AC/VIT B6 115-0.8-1
1 TABLET ORAL DAILY
Qty: 90 TABLET | Refills: 4 | OUTPATIENT
Start: 2022-11-21

## 2022-12-06 NOTE — TELEPHONE ENCOUNTER
pharmacy requesting medication refill.  Please approve or deny this request.    Rx requested:  Requested Prescriptions     Pending Prescriptions Disp Refills    ipratropium-albuterol (DUONEB) 0.5-2.5 (3) MG/3ML SOLN nebulizer solution [Pharmacy Med Name: IPRAT-ALBUT 0.5-3(2.5) MG/3 ML] 360 mL 1     Sig: inhale contents of 1 vial ( 3 milliliters ) in nebulizer by mouth and INTO THE LUNGS every 4 hours if needed for wheezing         Last Office Visit:   10/6/2022      Next Visit Date:  Future Appointments   Date Time Provider Laura Armas   1/9/2023  1:15 PM Nissa Mcgee PA-C [address]

## 2022-12-09 RX ORDER — IPRATROPIUM BROMIDE AND ALBUTEROL SULFATE 2.5; .5 MG/3ML; MG/3ML
SOLUTION RESPIRATORY (INHALATION)
Qty: 360 ML | Refills: 1 | Status: SHIPPED | OUTPATIENT
Start: 2022-12-09

## 2023-01-01 DIAGNOSIS — J45.40 MODERATE PERSISTENT ASTHMA WITHOUT COMPLICATION: ICD-10-CM

## 2023-01-01 DIAGNOSIS — K21.00 GASTROESOPHAGEAL REFLUX DISEASE WITH ESOPHAGITIS: ICD-10-CM

## 2023-01-01 DIAGNOSIS — E87.6 HYPOKALEMIA: ICD-10-CM

## 2023-01-03 RX ORDER — IPRATROPIUM/ALBUTEROL SULFATE 20-100 MCG
MIST INHALER (GRAM) INHALATION
Qty: 8 G | Refills: 5 | Status: SHIPPED | OUTPATIENT
Start: 2023-01-03

## 2023-01-03 RX ORDER — OMEPRAZOLE 40 MG/1
CAPSULE, DELAYED RELEASE ORAL
Qty: 30 CAPSULE | Refills: 2 | Status: SHIPPED | OUTPATIENT
Start: 2023-01-03

## 2023-01-03 RX ORDER — POTASSIUM CHLORIDE 20 MEQ/1
TABLET, EXTENDED RELEASE ORAL
Qty: 60 TABLET | Refills: 2 | Status: SHIPPED | OUTPATIENT
Start: 2023-01-03

## 2023-01-03 NOTE — TELEPHONE ENCOUNTER
Comments:     Last Office Visit (last PCP visit):   10/6/2022    Next Visit Date:  Future Appointments   Date Time Provider Laura Armas   1/9/2023  1:15 PM Alley Motta PA-C [facility]       **If hasn't been seen in over a year OR hasn't followed up according to last diabetes/ADHD visit, make appointment for patient before sending refill to provider.     Rx requested:  Requested Prescriptions     Pending Prescriptions Disp Refills    COMBIVENT RESPIMAT  MCG/ACT AERS inhaler [Pharmacy Med Name: COMBIVENT RESPIMAT  MCG] 8 g 5     Sig: inhale 1 puff by mouth and INTO THE LUNGS every 4 hours    potassium chloride (KLOR-CON M) 20 MEQ extended release tablet [Pharmacy Med Name: POTASSIUM CL ER 20 MEQ TABLET] 60 tablet 2     Sig: take 1 tablet by mouth twice a day    omeprazole (PRILOSEC) 40 MG delayed release capsule [Pharmacy Med Name: OMEPRAZOLE DR 40 MG CAPSULE] 30 capsule 2     Sig: take 1 capsule by mouth once daily

## 2023-01-23 RX ORDER — IPRATROPIUM BROMIDE AND ALBUTEROL SULFATE 2.5; .5 MG/3ML; MG/3ML
SOLUTION RESPIRATORY (INHALATION)
Qty: 360 ML | Refills: 1 | OUTPATIENT
Start: 2023-01-23

## 2023-02-09 NOTE — TELEPHONE ENCOUNTER
Comments: This request is coming from the pharmacy. Last Office Visit (last PCP visit):   10/6/2022    Next Visit Date:  No future appointments. If hasn't been seen in over a year OR hasn't followed up according to last diabetes/ADHD visit, make appointment for patient before sending refill to provider.     Rx requested:  Requested Prescriptions     Pending Prescriptions Disp Refills    ipratropium-albuterol (DUONEB) 0.5-2.5 (3) MG/3ML SOLN nebulizer solution [Pharmacy Med Name: IPRAT-ALBUT 0.5-3(2.5) MG/3 ML] 360 mL 1     Sig: inhale contents of 1 vial ( 3 milliliters ) in nebulizer by mouth and INTO THE LUNGS every 4 hours if needed for wheezing

## 2023-02-10 RX ORDER — IPRATROPIUM BROMIDE AND ALBUTEROL SULFATE 2.5; .5 MG/3ML; MG/3ML
SOLUTION RESPIRATORY (INHALATION)
Qty: 360 ML | Refills: 1 | Status: SHIPPED | OUTPATIENT
Start: 2023-02-10

## 2023-04-06 RX ORDER — IPRATROPIUM BROMIDE AND ALBUTEROL SULFATE 2.5; .5 MG/3ML; MG/3ML
SOLUTION RESPIRATORY (INHALATION)
Qty: 360 ML | Refills: 1 | Status: SHIPPED | OUTPATIENT
Start: 2023-04-06

## 2023-04-06 NOTE — TELEPHONE ENCOUNTER
Rx request   Requested Prescriptions     Pending Prescriptions Disp Refills    ipratropium-albuterol (DUONEB) 0.5-2.5 (3) MG/3ML SOLN nebulizer solution [Pharmacy Med Name: IPRAT-ALBUT 0.5-3(2.5) MG/3 ML] 360 mL 1     Sig: inhale contents of 1 vial ( 3 milliliters ) in nebulizer by mouth and INTO THE LUNGS every 4 hours if needed for wheezing     LOV 10/6/2022  Last refill 2/10/23  Next Visit Date:  No future appointments.

## 2023-06-09 RX ORDER — IPRATROPIUM BROMIDE AND ALBUTEROL SULFATE 2.5; .5 MG/3ML; MG/3ML
SOLUTION RESPIRATORY (INHALATION)
Qty: 360 ML | Refills: 1 | Status: SHIPPED | OUTPATIENT
Start: 2023-06-09

## 2023-06-09 NOTE — TELEPHONE ENCOUNTER
Comments: This request is coming from the pharmacy. Last Office Visit (last PCP visit):   10/6/2022    Next Visit Date:  No future appointments. If hasn't been seen in over a year OR hasn't followed up according to last diabetes/ADHD visit, make appointment for patient before sending refill to provider.     Rx requested:  Requested Prescriptions     Pending Prescriptions Disp Refills    ipratropium 0.5 mg-albuterol 2.5 mg (DUONEB) 0.5-2.5 (3) MG/3ML SOLN nebulizer solution [Pharmacy Med Name: IPRAT-ALBUT 0.5-3(2.5) MG/3 ML] 360 mL 1     Sig: inhale contents of 1 vial  in nebulizer by mouth and INTO THE LUNGS every 4 hours if needed for wheezing

## 2023-06-15 DIAGNOSIS — E53.8 FOLATE DEFICIENCY: ICD-10-CM

## 2023-06-15 DIAGNOSIS — E87.6 HYPOKALEMIA: ICD-10-CM

## 2023-06-15 DIAGNOSIS — M54.12 RADICULOPATHY, CERVICAL: ICD-10-CM

## 2023-06-15 PROBLEM — S83.232A COMPLEX TEAR OF MEDIAL MENISCUS OF LEFT KNEE AS CURRENT INJURY: Status: RESOLVED | Noted: 2022-07-07 | Resolved: 2023-06-15

## 2023-06-15 PROBLEM — M25.521 ELBOW PAIN, RIGHT: Status: ACTIVE | Noted: 2023-06-15

## 2023-06-15 LAB
ALBUMIN SERPL-MCNC: 4.6 G/DL (ref 3.5–4.6)
ALP SERPL-CCNC: 106 U/L (ref 35–104)
ALT SERPL-CCNC: 16 U/L (ref 0–41)
ANION GAP SERPL CALCULATED.3IONS-SCNC: 13 MEQ/L (ref 9–15)
AST SERPL-CCNC: 14 U/L (ref 0–40)
BILIRUB SERPL-MCNC: 0.4 MG/DL (ref 0.2–0.7)
BUN SERPL-MCNC: 6 MG/DL (ref 6–20)
CALCIUM SERPL-MCNC: 9.4 MG/DL (ref 8.5–9.9)
CHLORIDE SERPL-SCNC: 99 MEQ/L (ref 95–107)
CO2 SERPL-SCNC: 28 MEQ/L (ref 20–31)
CREAT SERPL-MCNC: 1.06 MG/DL (ref 0.7–1.2)
ERYTHROCYTE [DISTWIDTH] IN BLOOD BY AUTOMATED COUNT: 12.5 % (ref 11.5–14.5)
GLOBULIN SER CALC-MCNC: 2.9 G/DL (ref 2.3–3.5)
GLUCOSE SERPL-MCNC: 94 MG/DL (ref 70–99)
HCT VFR BLD AUTO: 43.9 % (ref 42–52)
HGB BLD-MCNC: 14.8 G/DL (ref 14–18)
MCH RBC QN AUTO: 31.6 PG (ref 27–31.3)
MCHC RBC AUTO-ENTMCNC: 33.6 % (ref 33–37)
MCV RBC AUTO: 94.1 FL (ref 79–92.2)
PLATELET # BLD AUTO: 403 K/UL (ref 130–400)
POTASSIUM SERPL-SCNC: 3.2 MEQ/L (ref 3.4–4.9)
PROT SERPL-MCNC: 7.5 G/DL (ref 6.3–8)
RBC # BLD AUTO: 4.67 M/UL (ref 4.7–6.1)
SODIUM SERPL-SCNC: 140 MEQ/L (ref 135–144)
WBC # BLD AUTO: 14.3 K/UL (ref 4.8–10.8)

## 2023-06-16 LAB
FOLATE: 2.2 NG/ML
VITAMIN B-12: 451 PG/ML (ref 232–1245)

## 2023-06-18 DIAGNOSIS — R79.89 ABNORMAL CBC MEASUREMENT: ICD-10-CM

## 2023-06-18 DIAGNOSIS — E53.8 FOLATE DEFICIENCY: ICD-10-CM

## 2023-06-18 DIAGNOSIS — E87.6 HYPOKALEMIA: ICD-10-CM

## 2023-06-19 DIAGNOSIS — E53.8 FOLATE DEFICIENCY: ICD-10-CM

## 2023-06-19 DIAGNOSIS — R79.89 ABNORMAL CBC MEASUREMENT: ICD-10-CM

## 2023-06-19 RX ORDER — POTASSIUM CHLORIDE 20 MEQ/1
20 TABLET, EXTENDED RELEASE ORAL 2 TIMES DAILY
Qty: 60 TABLET | Refills: 2 | Status: SHIPPED | OUTPATIENT
Start: 2023-06-19

## 2023-06-19 RX ORDER — CYANOCOBALAMIN/FOLIC AC/VIT B6 115-0.8-1
1 TABLET ORAL DAILY
Qty: 90 TABLET | Refills: 4 | Status: SHIPPED | OUTPATIENT
Start: 2023-06-19

## 2023-06-20 RX ORDER — CYANOCOBALAMIN/FOLIC AC/VIT B6 115-0.8-1
1 TABLET ORAL DAILY
Qty: 90 TABLET | Refills: 4 | OUTPATIENT
Start: 2023-06-20

## 2023-07-09 DIAGNOSIS — K21.00 GASTROESOPHAGEAL REFLUX DISEASE WITH ESOPHAGITIS: ICD-10-CM

## 2023-07-10 RX ORDER — OMEPRAZOLE 40 MG/1
CAPSULE, DELAYED RELEASE ORAL
Qty: 90 CAPSULE | Refills: 0 | Status: SHIPPED | OUTPATIENT
Start: 2023-07-10

## 2023-07-10 NOTE — TELEPHONE ENCOUNTER
Comments:     Last Office Visit (last PCP visit):   6/15/2023    Next Visit Date:  No future appointments. **If hasn't been seen in over a year OR hasn't followed up according to last diabetes/ADHD visit, make appointment for patient before sending refill to provider.     Rx requested:  Requested Prescriptions     Pending Prescriptions Disp Refills    omeprazole (PRILOSEC) 40 MG delayed release capsule [Pharmacy Med Name: OMEPRAZOLE DR 40 MG CAPSULE] 90 capsule 0     Sig: take 1 capsule by mouth once daily

## 2023-07-15 DIAGNOSIS — R79.89 ABNORMAL CBC MEASUREMENT: ICD-10-CM

## 2023-07-15 DIAGNOSIS — E53.8 FOLATE DEFICIENCY: ICD-10-CM

## 2023-07-19 NOTE — TELEPHONE ENCOUNTER
Comments:     Last Office Visit (last PCP visit):   6/15/2023    Next Visit Date:  No future appointments. **If hasn't been seen in over a year OR hasn't followed up according to last diabetes/ADHD visit, make appointment for patient before sending refill to provider. Rx requested:  Requested Prescriptions     Pending Prescriptions Disp Refills    folic acid-pyridoxine-cyanocobalamin (FOLTABS 800) 0.8-10-0. 115 MG TABS tablet 90 tablet 4     Sig: Take 1 tablet by mouth daily

## 2023-07-20 RX ORDER — CYANOCOBALAMIN/FOLIC AC/VIT B6 115-0.8-1
1 TABLET ORAL DAILY
Qty: 90 TABLET | Refills: 4 | Status: SHIPPED | OUTPATIENT
Start: 2023-07-20

## 2023-08-10 RX ORDER — IPRATROPIUM BROMIDE AND ALBUTEROL SULFATE 2.5; .5 MG/3ML; MG/3ML
SOLUTION RESPIRATORY (INHALATION)
Qty: 360 ML | Refills: 1 | Status: SHIPPED | OUTPATIENT
Start: 2023-08-10

## 2023-08-10 NOTE — TELEPHONE ENCOUNTER
Comments: This request is coming from the pharmacy. Last Office Visit (last PCP visit):   6/15/2023    Next Visit Date:  No future appointments. If hasn't been seen in over a year OR hasn't followed up according to last diabetes/ADHD visit, make appointment for patient before sending refill to provider.     Rx requested:  Requested Prescriptions     Pending Prescriptions Disp Refills    ipratropium 0.5 mg-albuterol 2.5 mg (DUONEB) 0.5-2.5 (3) MG/3ML SOLN nebulizer solution [Pharmacy Med Name: IPRAT-ALBUT 0.5-3(2.5) MG/3 ML] 360 mL 1     Sig: inhale contents of 1 vial  in nebulizer by mouth and INTO THE LUNGS every 4 hours if needed for wheezing

## 2023-10-08 DIAGNOSIS — K21.00 GASTROESOPHAGEAL REFLUX DISEASE WITH ESOPHAGITIS: ICD-10-CM

## 2023-10-10 RX ORDER — OMEPRAZOLE 40 MG/1
CAPSULE, DELAYED RELEASE ORAL
Qty: 90 CAPSULE | Refills: 1 | Status: SHIPPED | OUTPATIENT
Start: 2023-10-10

## 2023-10-16 DIAGNOSIS — E87.6 HYPOKALEMIA: ICD-10-CM

## 2023-10-16 RX ORDER — POTASSIUM CHLORIDE 20 MEQ/1
20 TABLET, EXTENDED RELEASE ORAL 2 TIMES DAILY
Qty: 60 TABLET | Refills: 2 | Status: SHIPPED | OUTPATIENT
Start: 2023-10-16

## 2023-10-16 RX ORDER — IPRATROPIUM BROMIDE AND ALBUTEROL SULFATE 2.5; .5 MG/3ML; MG/3ML
SOLUTION RESPIRATORY (INHALATION)
Qty: 360 ML | Refills: 1 | Status: SHIPPED | OUTPATIENT
Start: 2023-10-16

## 2023-10-16 NOTE — TELEPHONE ENCOUNTER
Comments:     Last Office Visit (last PCP visit):   6/15/2023    Next Visit Date:  No future appointments. **If hasn't been seen in over a year OR hasn't followed up according to last diabetes/ADHD visit, make appointment for patient before sending refill to provider.     Rx requested:  Requested Prescriptions     Pending Prescriptions Disp Refills    potassium chloride (KLOR-CON M) 20 MEQ extended release tablet [Pharmacy Med Name: POTASSIUM CL ER 20 MEQ TABLET] 60 tablet 2     Sig: take 1 tablet by mouth twice a day    ipratropium 0.5 mg-albuterol 2.5 mg (DUONEB) 0.5-2.5 (3) MG/3ML SOLN nebulizer solution [Pharmacy Med Name: IPRAT-ALBUT 0.5-3(2.5) MG/3 ML] 360 mL 1     Sig: inhale contents of 1 vial  in nebulizer by mouth and INTO THE LUNGS every 4 hours if needed for wheezing

## 2023-11-07 RX ORDER — VITAMIN B COMPLEX
1 CAPSULE ORAL DAILY
Qty: 90 CAPSULE | Refills: 3 | Status: SHIPPED | OUTPATIENT
Start: 2023-11-07

## 2023-11-07 NOTE — TELEPHONE ENCOUNTER
Comments:     Last Office Visit (last PCP visit):   6/15/2023    Next Visit Date:  Future Appointments   Date Time Provider [address]   12/8/2023  2:00 PM [facility]       **If hasn't been seen in over a year OR hasn't followed up according to last diabetes/ADHD visit, make appointment for patient before sending refill to provider.     Rx requested:  Requested Prescriptions     Pending Prescriptions Disp Refills    B Complex Vitamins (VITAMIN B COMPLEX) CAPS [Pharmacy Med Name: VITAMIN B COMPLEX SOFTGEL] 90 capsule      Sig: TAKE ONE TABLET DAILY

## 2023-12-08 ENCOUNTER — OFFICE VISIT (OUTPATIENT)
Dept: FAMILY MEDICINE CLINIC | Age: 43
End: 2023-12-08
Payer: COMMERCIAL

## 2023-12-08 VITALS
SYSTOLIC BLOOD PRESSURE: 100 MMHG | BODY MASS INDEX: 26.83 KG/M2 | WEIGHT: 177 LBS | HEART RATE: 59 BPM | HEIGHT: 68 IN | OXYGEN SATURATION: 100 % | DIASTOLIC BLOOD PRESSURE: 60 MMHG | RESPIRATION RATE: 18 BRPM

## 2023-12-08 DIAGNOSIS — M79.641 BILATERAL HAND PAIN: ICD-10-CM

## 2023-12-08 DIAGNOSIS — E87.6 HYPOKALEMIA: ICD-10-CM

## 2023-12-08 DIAGNOSIS — J45.40 MODERATE PERSISTENT ASTHMA WITHOUT COMPLICATION: ICD-10-CM

## 2023-12-08 DIAGNOSIS — E87.6 HYPOKALEMIA: Primary | ICD-10-CM

## 2023-12-08 DIAGNOSIS — E55.9 VITAMIN D DEFICIENCY: ICD-10-CM

## 2023-12-08 DIAGNOSIS — M79.642 BILATERAL HAND PAIN: ICD-10-CM

## 2023-12-08 LAB
MAGNESIUM SERPL-MCNC: 1.7 MG/DL (ref 1.7–2.4)
POTASSIUM SERPL-SCNC: 3.4 MEQ/L (ref 3.4–4.9)

## 2023-12-08 PROCEDURE — 99214 OFFICE O/P EST MOD 30 MIN: CPT | Performed by: PHYSICIAN ASSISTANT

## 2023-12-08 PROCEDURE — G8419 CALC BMI OUT NRM PARAM NOF/U: HCPCS | Performed by: PHYSICIAN ASSISTANT

## 2023-12-08 PROCEDURE — 4004F PT TOBACCO SCREEN RCVD TLK: CPT | Performed by: PHYSICIAN ASSISTANT

## 2023-12-08 PROCEDURE — G8484 FLU IMMUNIZE NO ADMIN: HCPCS | Performed by: PHYSICIAN ASSISTANT

## 2023-12-08 PROCEDURE — G8427 DOCREV CUR MEDS BY ELIG CLIN: HCPCS | Performed by: PHYSICIAN ASSISTANT

## 2023-12-08 RX ORDER — CHOLECALCIFEROL (VITAMIN D3) 125 MCG
5000 CAPSULE ORAL DAILY
Qty: 90 CAPSULE | Refills: 4 | Status: SHIPPED | OUTPATIENT
Start: 2023-12-08

## 2023-12-08 RX ORDER — OXYCODONE HYDROCHLORIDE AND ACETAMINOPHEN 5; 325 MG/1; MG/1
1 TABLET ORAL EVERY 6 HOURS PRN
Qty: 28 TABLET | Refills: 0 | Status: SHIPPED | OUTPATIENT
Start: 2023-12-08 | End: 2023-12-15

## 2023-12-08 ASSESSMENT — ENCOUNTER SYMPTOMS
BACK PAIN: 1
COLOR CHANGE: 0
SHORTNESS OF BREATH: 0
WHEEZING: 0
CHEST TIGHTNESS: 0

## 2023-12-08 NOTE — PROGRESS NOTES
visit.     PMH, Surgical Hx, Family Hx, and Social Hx reviewed and updated. Health Maintenance reviewed. Objective  Vitals:    12/08/23 1402   BP: 100/60   Site: Left Upper Arm   Position: Sitting   Cuff Size: Medium Adult   Pulse: 59   Resp: 18   SpO2: 100%   Weight: 80.3 kg (177 lb)   Height: 1.715 m (5' 7.5\")     BP Readings from Last 3 Encounters:   12/08/23 100/60   06/15/23 102/78   10/06/22 130/80     Wt Readings from Last 3 Encounters:   12/08/23 80.3 kg (177 lb)   06/15/23 76.2 kg (168 lb)   10/06/22 77.6 kg (171 lb)     Physical Exam  Vitals reviewed. Constitutional:       Appearance: Normal appearance. He is normal weight. HENT:      Head: Normocephalic and atraumatic. Right Ear: External ear normal.      Left Ear: External ear normal.      Nose: Nose normal.      Mouth/Throat:      Mouth: Mucous membranes are moist.   Eyes:      Conjunctiva/sclera: Conjunctivae normal.   Cardiovascular:      Rate and Rhythm: Normal rate and regular rhythm. Pulmonary:      Effort: Pulmonary effort is normal.      Breath sounds: Normal breath sounds. Musculoskeletal:         General: Tenderness (with spasm of posterior neck with palpation) present. No swelling, deformity or signs of injury. Right shoulder: Tenderness present. No swelling, effusion, bony tenderness or crepitus. Normal range of motion. Normal strength. Normal pulse. Left shoulder: Normal.        Arms:       Right knee: Bony tenderness and crepitus present. Left knee: Bony tenderness and crepitus present. Right lower leg: No edema. Left lower leg: No edema. Comments: Decrease  strength.  +tinnels of ulnar nerve with percussion. Skin:     General: Skin is warm. Neurological:      General: No focal deficit present. Mental Status: He is alert and oriented to person, place, and time. Assessment & Plan    Diagnosis Orders   1. Hypokalemia  Potassium    Magnesium      2.  Vitamin D deficiency

## 2023-12-13 RX ORDER — IPRATROPIUM BROMIDE AND ALBUTEROL SULFATE 2.5; .5 MG/3ML; MG/3ML
SOLUTION RESPIRATORY (INHALATION)
Qty: 360 ML | Refills: 1 | Status: SHIPPED | OUTPATIENT
Start: 2023-12-13

## 2023-12-13 NOTE — TELEPHONE ENCOUNTER
Comments:     Last Office Visit (last PCP visit):   12/8/2023    Next Visit Date:  No future appointments. **If hasn't been seen in over a year OR hasn't followed up according to last diabetes/ADHD visit, make appointment for patient before sending refill to provider.     Rx requested:  Requested Prescriptions     Pending Prescriptions Disp Refills    ipratropium 0.5 mg-albuterol 2.5 mg (DUONEB) 0.5-2.5 (3) MG/3ML SOLN nebulizer solution [Pharmacy Med Name: IPRAT-ALBUT 0.5-3(2.5) MG/3 ML] 360 mL 1     Sig: inhale contents of 1 vial  in nebulizer by mouth and INTO THE LUNGS every 4 hours if needed for wheezing

## 2023-12-26 DIAGNOSIS — J45.40 MODERATE PERSISTENT ASTHMA WITHOUT COMPLICATION: ICD-10-CM

## 2023-12-26 RX ORDER — IPRATROPIUM BROMIDE AND ALBUTEROL 20; 100 UG/1; UG/1
SPRAY, METERED RESPIRATORY (INHALATION)
Qty: 8 G | Refills: 5 | Status: SHIPPED | OUTPATIENT
Start: 2023-12-26

## 2023-12-26 NOTE — TELEPHONE ENCOUNTER
Please approve or deny request. Thank you! Rx requested:  Requested Prescriptions     Pending Prescriptions Disp Refills    COMBIVENT RESPIMAT  MCG/ACT AERS inhaler [Pharmacy Med Name: COMBIVENT RESPIMAT  MCG] 8 g 5     Sig: inhale 1 puff by mouth and INTO THE LUNGS every 4 hours         Last Office Visit:   12/8/2023      Next Visit Date:  No future appointments.

## 2024-01-01 DIAGNOSIS — J45.40 MODERATE PERSISTENT ASTHMA WITHOUT COMPLICATION: ICD-10-CM

## 2024-01-01 DIAGNOSIS — E53.8 FOLATE DEFICIENCY: ICD-10-CM

## 2024-01-01 DIAGNOSIS — R79.89 ABNORMAL CBC MEASUREMENT: ICD-10-CM

## 2024-01-03 RX ORDER — CYANOCOBALAMIN/FOLIC AC/VIT B6 115-0.8-1
1 TABLET ORAL DAILY
Qty: 90 TABLET | Refills: 4 | Status: SHIPPED | OUTPATIENT
Start: 2024-01-03

## 2024-01-03 NOTE — TELEPHONE ENCOUNTER
Comments: This request is coming from the pharmacy.    Last Office Visit (last PCP visit):   12/8/2023    Next Visit Date:  No future appointments.    If hasn't been seen in over a year OR hasn't followed up according to last diabetes/ADHD visit, make appointment for patient before sending refill to provider.    Rx requested:  Requested Prescriptions     Pending Prescriptions Disp Refills    folic acid-pyridoxine-cyanocobalamin (FOLTABS 800) 0.8-10-0.115 MG TABS tablet 90 tablet 4     Sig: Take 1 tablet by mouth daily

## 2024-01-24 DIAGNOSIS — E87.6 HYPOKALEMIA: ICD-10-CM

## 2024-01-25 RX ORDER — POTASSIUM CHLORIDE 20 MEQ/1
20 TABLET, EXTENDED RELEASE ORAL 2 TIMES DAILY
Qty: 180 TABLET | Refills: 1 | Status: SHIPPED | OUTPATIENT
Start: 2024-01-25

## 2024-01-25 NOTE — TELEPHONE ENCOUNTER
Comments:     Last Office Visit (last PCP visit):   12/8/2023    Next Visit Date:  No future appointments.    **If hasn't been seen in over a year OR hasn't followed up according to last diabetes/ADHD visit, make appointment for patient before sending refill to provider.    Rx requested:  Requested Prescriptions     Pending Prescriptions Disp Refills    potassium chloride (KLOR-CON M) 20 MEQ extended release tablet [Pharmacy Med Name: POTASSIUM CL ER 20 MEQ TABLET] 180 tablet      Sig: take 1 tablet by mouth twice a day

## 2024-02-09 DIAGNOSIS — K21.00 GASTROESOPHAGEAL REFLUX DISEASE WITH ESOPHAGITIS: ICD-10-CM

## 2024-02-12 RX ORDER — OMEPRAZOLE 40 MG/1
CAPSULE, DELAYED RELEASE ORAL
Qty: 90 CAPSULE | Refills: 1 | Status: SHIPPED | OUTPATIENT
Start: 2024-02-12

## 2024-02-12 NOTE — TELEPHONE ENCOUNTER
Comments:     Last Office Visit (last PCP visit):   12/8/2023    Next Visit Date:  No future appointments.    **If hasn't been seen in over a year OR hasn't followed up according to last diabetes/ADHD visit, make appointment for patient before sending refill to provider.    Rx requested:  Requested Prescriptions     Pending Prescriptions Disp Refills    omeprazole (PRILOSEC) 40 MG delayed release capsule [Pharmacy Med Name: OMEPRAZOLE 40MG CAPSULES] 90 capsule 1     Sig: TAKE 1 CAPSULE BY MOUTH ONCE DAILY

## 2024-02-15 DIAGNOSIS — E53.8 FOLATE DEFICIENCY: ICD-10-CM

## 2024-02-15 DIAGNOSIS — J45.40 MODERATE PERSISTENT ASTHMA WITHOUT COMPLICATION: ICD-10-CM

## 2024-02-15 DIAGNOSIS — R79.89 ABNORMAL CBC MEASUREMENT: ICD-10-CM

## 2024-02-15 RX ORDER — CYANOCOBALAMIN/FOLIC AC/VIT B6 115-0.8-1
1 TABLET ORAL DAILY
Qty: 90 TABLET | Refills: 4 | Status: SHIPPED | OUTPATIENT
Start: 2024-02-15

## 2024-02-15 NOTE — TELEPHONE ENCOUNTER
Comments:     Last Office Visit (last PCP visit):   12/8/2023    Next Visit Date:  No future appointments.    **If hasn't been seen in over a year OR hasn't followed up according to last diabetes/ADHD visit, make appointment for patient before sending refill to provider.    Rx requested:  Requested Prescriptions     Pending Prescriptions Disp Refills    albuterol-ipratropium (COMBIVENT RESPIMAT)  MCG/ACT AERS inhaler 8 g 5    folic acid-pyridoxine-cyanocobalamin (FOLTABS 800) 0.8-10-0.115 MG TABS tablet 90 tablet 4     Sig: Take 1 tablet by mouth daily

## 2024-04-30 DIAGNOSIS — J45.40 MODERATE PERSISTENT ASTHMA WITHOUT COMPLICATION: ICD-10-CM

## 2024-05-01 NOTE — TELEPHONE ENCOUNTER
Comments: sending [facility] appt reminder    Last Office Visit (last PCP visit):   12/8/2023    Next Visit Date:  No future appointments.    **If hasn't been seen in over a year OR hasn't followed up according to last diabetes/ADHD visit, make appointment for patient before sending refill to provider.    Rx requested:  Requested Prescriptions     Pending Prescriptions Disp Refills    albuterol-ipratropium (COMBIVENT RESPIMAT)  MCG/ACT AERS inhaler 8 g 5     Sig: inhale 1 puff by mouth and INTO THE LUNGS every 4 hours

## 2024-05-06 DIAGNOSIS — K21.00 GASTROESOPHAGEAL REFLUX DISEASE WITH ESOPHAGITIS: ICD-10-CM

## 2024-05-06 DIAGNOSIS — R79.89 ABNORMAL CBC MEASUREMENT: ICD-10-CM

## 2024-05-06 DIAGNOSIS — E53.8 FOLATE DEFICIENCY: ICD-10-CM

## 2024-05-06 DIAGNOSIS — E87.6 HYPOKALEMIA: ICD-10-CM

## 2024-05-06 DIAGNOSIS — J45.40 MODERATE PERSISTENT ASTHMA WITHOUT COMPLICATION: ICD-10-CM

## 2024-05-06 DIAGNOSIS — E55.9 VITAMIN D DEFICIENCY: ICD-10-CM

## 2024-05-09 RX ORDER — OMEPRAZOLE 40 MG/1
40 CAPSULE, DELAYED RELEASE ORAL DAILY
Qty: 90 CAPSULE | Refills: 1 | Status: SHIPPED | OUTPATIENT
Start: 2024-05-09

## 2024-05-09 RX ORDER — IPRATROPIUM BROMIDE AND ALBUTEROL SULFATE 2.5; .5 MG/3ML; MG/3ML
SOLUTION RESPIRATORY (INHALATION)
Qty: 360 ML | Refills: 1 | Status: SHIPPED | OUTPATIENT
Start: 2024-05-09

## 2024-05-09 RX ORDER — POTASSIUM CHLORIDE 20 MEQ/1
20 TABLET, EXTENDED RELEASE ORAL 2 TIMES DAILY
Qty: 180 TABLET | Refills: 1 | Status: SHIPPED | OUTPATIENT
Start: 2024-05-09

## 2024-05-09 RX ORDER — CYANOCOBALAMIN/FOLIC AC/VIT B6 115-0.8-1
1 TABLET ORAL DAILY
Qty: 90 TABLET | Refills: 4 | Status: SHIPPED | OUTPATIENT
Start: 2024-05-09

## 2024-05-09 RX ORDER — VITAMIN B COMPLEX
1 CAPSULE ORAL DAILY
Qty: 90 CAPSULE | Refills: 3 | Status: SHIPPED | OUTPATIENT
Start: 2024-05-09

## 2024-05-09 RX ORDER — CHOLECALCIFEROL (VITAMIN D3) 125 MCG
5000 CAPSULE ORAL DAILY
Qty: 90 CAPSULE | Refills: 4 | Status: SHIPPED | OUTPATIENT
Start: 2024-05-09

## 2024-05-09 NOTE — TELEPHONE ENCOUNTER
Future Appointments    Encounter Information   Provider Department Appt Notes   6/27/2024 Serina Elizabeth PA-C [facility] Primary Care Medicine     Past Visits    Date Provider Specialty Visit Type Primary Dx   12/08/2023 Serina Elizabeth PA-C Family Medicine Office Visit Hypokalemia   06/15/2023 Serina Elizabeth PA-C Family Medicine Office Visit Bilateral carpal tunnel syndrome   11/17/2022 Archie Jean PA-C Orthopedic Surgery Office Visit Injury of right anterior cruciate ligament

## 2024-06-27 ENCOUNTER — OFFICE VISIT (OUTPATIENT)
Dept: FAMILY MEDICINE CLINIC | Age: 44
End: 2024-06-27
Payer: COMMERCIAL

## 2024-06-27 VITALS
OXYGEN SATURATION: 97 % | HEART RATE: 58 BPM | BODY MASS INDEX: 26.67 KG/M2 | SYSTOLIC BLOOD PRESSURE: 110 MMHG | HEIGHT: 68 IN | RESPIRATION RATE: 16 BRPM | DIASTOLIC BLOOD PRESSURE: 70 MMHG | WEIGHT: 176 LBS

## 2024-06-27 DIAGNOSIS — E53.8 FOLATE DEFICIENCY: ICD-10-CM

## 2024-06-27 DIAGNOSIS — K21.00 GASTROESOPHAGEAL REFLUX DISEASE WITH ESOPHAGITIS WITHOUT HEMORRHAGE: ICD-10-CM

## 2024-06-27 DIAGNOSIS — R79.89 ABNORMAL CBC MEASUREMENT: ICD-10-CM

## 2024-06-27 DIAGNOSIS — J45.40 MODERATE PERSISTENT ASTHMA WITHOUT COMPLICATION: Primary | ICD-10-CM

## 2024-06-27 DIAGNOSIS — M79.642 BILATERAL HAND PAIN: ICD-10-CM

## 2024-06-27 DIAGNOSIS — M79.641 BILATERAL HAND PAIN: ICD-10-CM

## 2024-06-27 DIAGNOSIS — E87.6 HYPOKALEMIA: ICD-10-CM

## 2024-06-27 PROCEDURE — 4004F PT TOBACCO SCREEN RCVD TLK: CPT | Performed by: PHYSICIAN ASSISTANT

## 2024-06-27 PROCEDURE — G8427 DOCREV CUR MEDS BY ELIG CLIN: HCPCS | Performed by: PHYSICIAN ASSISTANT

## 2024-06-27 PROCEDURE — 99214 OFFICE O/P EST MOD 30 MIN: CPT | Performed by: PHYSICIAN ASSISTANT

## 2024-06-27 PROCEDURE — G8419 CALC BMI OUT NRM PARAM NOF/U: HCPCS | Performed by: PHYSICIAN ASSISTANT

## 2024-06-27 RX ORDER — IPRATROPIUM BROMIDE AND ALBUTEROL SULFATE 2.5; .5 MG/3ML; MG/3ML
SOLUTION RESPIRATORY (INHALATION)
Qty: 360 ML | Refills: 2 | Status: SHIPPED | OUTPATIENT
Start: 2024-06-27

## 2024-06-27 RX ORDER — OMEPRAZOLE 40 MG/1
40 CAPSULE, DELAYED RELEASE ORAL DAILY
Qty: 90 CAPSULE | Refills: 2 | Status: SHIPPED | OUTPATIENT
Start: 2024-06-27

## 2024-06-27 RX ORDER — POTASSIUM CHLORIDE 20 MEQ/1
20 TABLET, EXTENDED RELEASE ORAL 2 TIMES DAILY
Qty: 180 TABLET | Refills: 3 | Status: SHIPPED | OUTPATIENT
Start: 2024-06-27

## 2024-06-27 RX ORDER — OXYCODONE HYDROCHLORIDE AND ACETAMINOPHEN 5; 325 MG/1; MG/1
1 TABLET ORAL EVERY 6 HOURS PRN
Qty: 28 TABLET | Refills: 0 | Status: SHIPPED | OUTPATIENT
Start: 2024-06-27 | End: 2024-07-04

## 2024-06-27 RX ORDER — IBUPROFEN 800 MG/1
800 TABLET ORAL EVERY 8 HOURS PRN
Qty: 90 TABLET | Refills: 2 | Status: SHIPPED | OUTPATIENT
Start: 2024-06-27

## 2024-06-27 SDOH — ECONOMIC STABILITY: FOOD INSECURITY: WITHIN THE PAST 12 MONTHS, YOU WORRIED THAT YOUR FOOD WOULD RUN OUT BEFORE YOU GOT MONEY TO BUY MORE.: NEVER TRUE

## 2024-06-27 SDOH — ECONOMIC STABILITY: HOUSING INSECURITY
IN THE LAST 12 MONTHS, WAS THERE A TIME WHEN YOU DID NOT HAVE A STEADY PLACE TO SLEEP OR SLEPT IN A SHELTER (INCLUDING NOW)?: NO

## 2024-06-27 SDOH — ECONOMIC STABILITY: FOOD INSECURITY: WITHIN THE PAST 12 MONTHS, THE FOOD YOU BOUGHT JUST DIDN'T LAST AND YOU DIDN'T HAVE MONEY TO GET MORE.: NEVER TRUE

## 2024-06-27 SDOH — ECONOMIC STABILITY: INCOME INSECURITY: HOW HARD IS IT FOR YOU TO PAY FOR THE VERY BASICS LIKE FOOD, HOUSING, MEDICAL CARE, AND HEATING?: NOT HARD AT ALL

## 2024-06-27 ASSESSMENT — ENCOUNTER SYMPTOMS
CHEST TIGHTNESS: 0
SHORTNESS OF BREATH: 0
WHEEZING: 0
BACK PAIN: 1
COLOR CHANGE: 0

## 2024-06-27 ASSESSMENT — PATIENT HEALTH QUESTIONNAIRE - PHQ9
1. LITTLE INTEREST OR PLEASURE IN DOING THINGS: NOT AT ALL
3. TROUBLE FALLING OR STAYING ASLEEP: SEVERAL DAYS
6. FEELING BAD ABOUT YOURSELF - OR THAT YOU ARE A FAILURE OR HAVE LET YOURSELF OR YOUR FAMILY DOWN: SEVERAL DAYS
SUM OF ALL RESPONSES TO PHQ9 QUESTIONS 1 & 2: 1
5. POOR APPETITE OR OVEREATING: SEVERAL DAYS
SUM OF ALL RESPONSES TO PHQ QUESTIONS 1-9: 6
2. FEELING DOWN, DEPRESSED OR HOPELESS: SEVERAL DAYS
9. THOUGHTS THAT YOU WOULD BE BETTER OFF DEAD, OR OF HURTING YOURSELF: NOT AT ALL
10. IF YOU CHECKED OFF ANY PROBLEMS, HOW DIFFICULT HAVE THESE PROBLEMS MADE IT FOR YOU TO DO YOUR WORK, TAKE CARE OF THINGS AT HOME, OR GET ALONG WITH OTHER PEOPLE: SOMEWHAT DIFFICULT
4. FEELING TIRED OR HAVING LITTLE ENERGY: SEVERAL DAYS
SUM OF ALL RESPONSES TO PHQ QUESTIONS 1-9: 6
SUM OF ALL RESPONSES TO PHQ QUESTIONS 1-9: 6
7. TROUBLE CONCENTRATING ON THINGS, SUCH AS READING THE NEWSPAPER OR WATCHING TELEVISION: NOT AT ALL
SUM OF ALL RESPONSES TO PHQ QUESTIONS 1-9: 6
8. MOVING OR SPEAKING SO SLOWLY THAT OTHER PEOPLE COULD HAVE NOTICED. OR THE OPPOSITE, BEING SO FIGETY OR RESTLESS THAT YOU HAVE BEEN MOVING AROUND A LOT MORE THAN USUAL: SEVERAL DAYS

## 2024-06-27 NOTE — PROGRESS NOTES
Subjective  Carl Giang, 43 y.o. male presents today with:  Chief Complaint   Patient presents with    Follow-up     General follow up        HPI  Last OV with me: 12/8/23.  Routine 6 month follow up.  Overall, doing OK.     Known weakness, peripheral neuropathy of hands.  Has had several EMGs.  Received injections at wrists with Dr. Pandya.  No relief of R hand, had fair response in his L hand.  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   Recently started working with a [employer] co.  Having pain in his knees, back.  Taking 8-10, 200 mg daily.  Asking for a refill of percocet PRN.  Will take 1/2 tablet nights that he is having a hard time sleeping.  Last refill was over 6 months ago.    Low risk of abuse.   No injury/fall.   Repetitive activities, lifting/bending/pulling.    Remains on K+ replacement therapy.  Needs/due for lab monitoring.     Remains on combivent and albuterol for COPD.  Doing well on inhalers.   Weather/heat is a known trigger.    Review of Systems   Constitutional:  Positive for activity change. Negative for appetite change, chills, diaphoresis, fatigue, fever and unexpected weight change.   Eyes:  Negative for visual disturbance.   Respiratory:  Negative for chest tightness, shortness of breath and wheezing.    Cardiovascular:  Negative for chest pain, palpitations and leg swelling.   Musculoskeletal:  Positive for arthralgias, back pain, gait problem (post-op, improving), myalgias, neck pain and neck stiffness. Negative for joint swelling.   Skin:  Negative for color change, rash and wound.   Neurological:  Positive for weakness (RUE) and numbness (bilateral hands, R>>L). Negative for dizziness, tremors, seizures and light-headedness.   Psychiatric/Behavioral:  Negative for dysphoric mood and sleep disturbance. The patient is not nervous/anxious.        Past Medical History:   Diagnosis Date    Acute stress disorder 12/17/2019    Complex tear of medial meniscus of left knee as

## 2024-09-23 RX ORDER — IBUPROFEN 800 MG/1
800 TABLET, FILM COATED ORAL EVERY 8 HOURS PRN
Qty: 90 TABLET | Refills: 2 | Status: SHIPPED | OUTPATIENT
Start: 2024-09-23

## 2025-02-01 DIAGNOSIS — J45.40 MODERATE PERSISTENT ASTHMA WITHOUT COMPLICATION: ICD-10-CM

## 2025-02-03 NOTE — TELEPHONE ENCOUNTER
Comments:     Last Office Visit (last PCP visit):   6/27/2024    Next Visit Date:  No future appointments.      Rx requested:  Requested Prescriptions     Pending Prescriptions Disp Refills    albuterol-ipratropium (COMBIVENT RESPIMAT)  MCG/ACT AERS inhaler [Pharmacy Med Name: COMBIVENT RESPIMAT  AERS] 8 g 5     Sig: INHALE ONE PUFF BY MOUTH EVERY 4 HOURS

## 2025-04-05 DIAGNOSIS — K21.00 GASTROESOPHAGEAL REFLUX DISEASE WITH ESOPHAGITIS WITHOUT HEMORRHAGE: ICD-10-CM

## 2025-04-07 RX ORDER — OMEPRAZOLE 40 MG/1
40 CAPSULE, DELAYED RELEASE ORAL DAILY
Qty: 90 CAPSULE | Refills: 2 | Status: SHIPPED | OUTPATIENT
Start: 2025-04-07

## 2025-04-07 NOTE — TELEPHONE ENCOUNTER
Comments:     Last Office Visit (last PCP visit):   6/27/2024    Next Visit Date:  No future appointments.      Rx requested:  Requested Prescriptions     Pending Prescriptions Disp Refills    omeprazole (PRILOSEC) 40 MG delayed release capsule [Pharmacy Med Name: OMEPRAZOLE 40MG CPDR] 90 capsule 2     Sig: Take 1 capsule by mouth daily

## 2025-05-06 NOTE — TELEPHONE ENCOUNTER
Comments:     Last Office Visit (last PCP visit):   6/27/2024    Next Visit Date:  No future appointments.      Rx requested:  Requested Prescriptions     Pending Prescriptions Disp Refills    B-Complex, Folic Acid, TABS [Pharmacy Med Name: B-COMPLEX (FOLIC ACID)  TABS] 90 tablet 3     Sig: Take 1 tablet by mouth daily

## 2025-05-07 RX ORDER — MULTIVITAMIN/IRON/FOLIC ACID 18MG-0.4MG
1 TABLET ORAL DAILY
Qty: 90 TABLET | Refills: 3 | Status: SHIPPED | OUTPATIENT
Start: 2025-05-07

## 2025-07-01 DIAGNOSIS — E55.9 VITAMIN D DEFICIENCY: ICD-10-CM

## 2025-07-01 RX ORDER — CHOLECALCIFEROL (VITAMIN D3) 125 MCG
5000 CAPSULE ORAL DAILY
Qty: 90 CAPSULE | Refills: 4 | Status: SHIPPED | OUTPATIENT
Start: 2025-07-01

## 2025-07-01 NOTE — TELEPHONE ENCOUNTER
Comments:     Last Office Visit (last PCP visit):   6/27/2024    Next Visit Date:  No future appointments.    **If hasn't been seen in over a year OR hasn't followed up according to last diabetes/ADHD visit, make appointment for patient before sending refill to provider.    Rx requested:  Requested Prescriptions     Pending Prescriptions Disp Refills    D3 HIGH POTENCY 125 MCG (5000 UT) CAPS capsule [Pharmacy Med Name: D3 HIGH POTENCY 125 MCG CAPS] 90 capsule 4     Sig: TAKE ONE CAPSULE BY MOUTH EVERY DAY

## (undated) DEVICE — DRAPE,U/ SHT,SPLIT,PLAS,STERIL: Brand: MEDLINE

## (undated) DEVICE — SUTURE MCRYL SZ 3-0 L27IN ABSRB UD L19MM PS-2 3/8 CIR PRIM Y427H

## (undated) DEVICE — COTTON UNDERCAST PADDING,REGULAR FINISH: Brand: WEBRIL

## (undated) DEVICE — GOWN,AURORA,NONREINFORCED,LARGE: Brand: MEDLINE

## (undated) DEVICE — PADDING UNDERCAST W6INXL4YD RAYON POLY SYN NONADHESIVE

## (undated) DEVICE — SUTURE VCRL SZ 2-0 L27IN ABSRB UD L26MM CT-2 1/2 CIR J269H

## (undated) DEVICE — DRESSING PETRO W3XL8IN OIL EMUL N ADH GZ KNIT IMPREG CELOS

## (undated) DEVICE — SYRINGE MED 10ML LUERLOCK TIP W/O SFTY DISP

## (undated) DEVICE — LABEL MED MINI W/ MARKER

## (undated) DEVICE — 3M™ STERI-DRAPE™ U-DRAPE 1015: Brand: STERI-DRAPE™

## (undated) DEVICE — BANDAGE,ELASTIC,ESMARK,STERILE,6"X9',LF: Brand: MEDLINE

## (undated) DEVICE — CLASSIC CLD THER SYS

## (undated) DEVICE — GLOVE ORANGE PI 8   MSG9080

## (undated) DEVICE — NEPTUNE E-SEP SMOKE EVACUATION PENCIL, COATED, 70MM BLADE, PUSH BUTTON SWITCH: Brand: NEPTUNE E-SEP

## (undated) DEVICE — WAND ABLAT P 50 HND CTRL VAPR

## (undated) DEVICE — STOCKINETTE,IMPERVIOUS,12X48,STERILE: Brand: MEDLINE

## (undated) DEVICE — SUTURE ORTHOCORD SZ 2-0 VLT BLU MO-7 NDL MULTIPAK 223114

## (undated) DEVICE — COUNTER NDL 40 COUNT HLD 70 FOAM BLK ADH W/ MAG

## (undated) DEVICE — ADJUSTAROM W/ EZ PADS

## (undated) DEVICE — ELECTRODE PT RET AD L9FT HI MOIST COND ADH HYDRGEL CORDED

## (undated) DEVICE — SUTURE VCRL SZ 1 L36IN ABSRB UD L36MM CT-1 1/2 CIR J947H

## (undated) DEVICE — 3M™ STERI-STRIP™ REINFORCED ADHESIVE SKIN CLOSURES, R1547, 1/2 IN X 4 IN (12 MM X 100 MM), 6 STRIPS/ENVELOPE: Brand: 3M™ STERI-STRIP™

## (undated) DEVICE — COVER,TABLE,44X90,STERILE: Brand: MEDLINE

## (undated) DEVICE — SYRINGE IRRIG 60ML SFT PLIABLE BLB EZ TO GRP 1 HND USE W/

## (undated) DEVICE — PAD,ABDOMINAL,8"X10",ST,LF: Brand: MEDLINE

## (undated) DEVICE — SYRINGE MED 50ML LUERLOCK TIP

## (undated) DEVICE — INTENDED FOR TISSUE SEPARATION, AND OTHER PROCEDURES THAT REQUIRE A SHARP SURGICAL BLADE TO PUNCTURE OR CUT.: Brand: BARD-PARKER ® CARBON RIB-BACK BLADES

## (undated) DEVICE — APPLICATOR MEDICATED 26 CC SOLUTION HI LT ORNG CHLORAPREP

## (undated) DEVICE — SPONGE,LAP,18"X18",DLX,XR,ST,5/PK,40/PK: Brand: MEDLINE

## (undated) DEVICE — TUBING, SUCTION, 1/4" X 10', STRAIGHT: Brand: MEDLINE

## (undated) DEVICE — DRAPE EQUIP TRNSPRT CONTAINMENT FOR BK TAB

## (undated) DEVICE — BANDAGE COBAN 6 IN WND 6INX5YD FOAM

## (undated) DEVICE — HYPODERMIC SAFETY NEEDLE: Brand: MAGELLAN

## (undated) DEVICE — ABSORBENT ROLL ECODRI-SAFE

## (undated) DEVICE — [AGGRESSIVE PLUS CUTTER, ARTHROSCOPIC SHAVER BLADE,  DO NOT RESTERILIZE,  DO NOT USE IF PACKAGE IS DAMAGED,  KEEP DRY,  KEEP AWAY FROM SUNLIGHT]: Brand: FORMULA

## (undated) DEVICE — TUBING PMP L8FT LNG W/ CONN FOR AR-6400 REDEUCE

## (undated) DEVICE — DRAIN SURG PENROSE 0.25X12 IN CLOSED WND DRAINAGE PREM SIL

## (undated) DEVICE — TUBE IRRIG L8IN LNG PT W/ CONN FOR PMP SYS REDEUCE

## (undated) DEVICE — 84" (213 CM) ARTERIAL PRESSURE TUBING: Brand: ICU MEDICAL

## (undated) DEVICE — TOWEL,OR,DSP,ST,BLUE,STD,4/PK,20PK/CS: Brand: MEDLINE

## (undated) DEVICE — GOWN,SIRUS,POLYRNF,BRTHSLV,XLN/XL,20/CS: Brand: MEDLINE

## (undated) DEVICE — NEEDLE SPNL 18GA L3.5IN W/ QNCKE SHARPER BVL DURA CLICK

## (undated) DEVICE — COVER LT HNDL BLU PLAS

## (undated) DEVICE — INTENT TO BE USED WITH SUTURE MATERIAL FOR TISSUE CLOSURE: Brand: RICHARD-ALLAN® NEEDLE 1/2 CIRCLE TAPER

## (undated) DEVICE — PAD COOL W10.9XL11.3IN UNIV REG HOSE WRP ON THER CLD

## (undated) DEVICE — SPONGE GZ W4XL4IN COT 12 PLY TYP VII WVN C FLD DSGN

## (undated) DEVICE — PACK,BASIC: Brand: MEDLINE

## (undated) DEVICE — KIT ACL ANT CRUCE LIG CANN RUL PEN DRL PIN GWIRE RETRCT

## (undated) DEVICE — 3M™ STERI-DRAPE™ ARTHROSCOPY SHEET WITH POUCH 1194: Brand: STERI-DRAPE™

## (undated) DEVICE — BANDAGE COMPR W6INXL5YD WHT BGE POLY COT M E WRP WV HK AND

## (undated) DEVICE — BRACE KNEE AD ALUM FOAM FULL UNIV HNG STRP CLSR ADJ X-ACT

## (undated) DEVICE — MARKER SURG SKIN GENTIAN VLT REG TIP W/ 6IN RUL